# Patient Record
Sex: MALE | Race: WHITE | Employment: OTHER | URBAN - METROPOLITAN AREA
[De-identification: names, ages, dates, MRNs, and addresses within clinical notes are randomized per-mention and may not be internally consistent; named-entity substitution may affect disease eponyms.]

---

## 2018-08-17 ENCOUNTER — TRANSFERRED RECORDS (OUTPATIENT)
Dept: HEALTH INFORMATION MANAGEMENT | Facility: CLINIC | Age: 73
End: 2018-08-17

## 2018-08-17 ENCOUNTER — HOSPITAL ENCOUNTER (OUTPATIENT)
Dept: LAB | Facility: CLINIC | Age: 73
Discharge: HOME | End: 2018-08-17
Attending: ALLERGY & IMMUNOLOGY | Admitting: ALLERGY & IMMUNOLOGY
Payer: COMMERCIAL

## 2018-08-17 DIAGNOSIS — J30.1 ALLERGIC RHINITIS DUE TO POLLEN: Primary | ICD-10-CM

## 2018-08-17 PROCEDURE — 86003 ALLG SPEC IGE CRUDE XTRC EA: CPT | Performed by: ALLERGY & IMMUNOLOGY

## 2018-08-17 PROCEDURE — 36415 COLL VENOUS BLD VENIPUNCTURE: CPT | Performed by: ALLERGY & IMMUNOLOGY

## 2018-08-20 LAB
D FARINAE IGE QN: <0.1 KU(A)/L
D PTERONYSS IGE QN: <0.1 KU(A)/L

## 2018-08-21 LAB
A ALTERNATA IGE QN: <0.1 KU(A)/L
A FUMIGATUS IGE QN: <0.1 KU(A)/L
C HERBARUM IGE QN: <0.1 KU(A)/L
CAT DANDER IGG QN: <0.1 KU(A)/L
COMMON RAGWEED IGE QN: <0.1 KU(A)/L
DOG DANDER+EPITH IGE QN: <0.1 KU(A)/L
MAPLE IGE QN: <0.1 KU(A)/L
P NOTATUM IGE QN: <0.1 KU(A)/L
S ROSTRATA IGE QN: <0.1 KU(A)/L
SILVER BIRCH IGE QN: <0.1 KU(A)/L
TIMOTHY IGE QN: <0.1 KU(A)/L
WHITE ELM IGE QN: <0.1 KU(A)/L
WHITE OAK IGE QN: <0.1 KU(A)/L

## 2020-01-01 ENCOUNTER — NURSE TRIAGE (OUTPATIENT)
Dept: NURSING | Facility: CLINIC | Age: 75
End: 2020-01-01

## 2020-01-01 ENCOUNTER — APPOINTMENT (OUTPATIENT)
Dept: ULTRASOUND IMAGING | Facility: CLINIC | Age: 75
End: 2020-01-01
Attending: INTERNAL MEDICINE
Payer: COMMERCIAL

## 2020-01-01 ENCOUNTER — HOSPITAL ENCOUNTER (OUTPATIENT)
Facility: CLINIC | Age: 75
Setting detail: OBSERVATION
Discharge: HOME | End: 2020-10-30
Attending: EMERGENCY MEDICINE | Admitting: RADIOLOGY
Payer: COMMERCIAL

## 2020-01-01 ENCOUNTER — APPOINTMENT (OUTPATIENT)
Dept: GENERAL RADIOLOGY | Facility: CLINIC | Age: 75
End: 2020-01-01
Attending: EMERGENCY MEDICINE
Payer: COMMERCIAL

## 2020-01-01 VITALS
BODY MASS INDEX: 30.23 KG/M2 | OXYGEN SATURATION: 90 % | SYSTOLIC BLOOD PRESSURE: 131 MMHG | WEIGHT: 192.6 LBS | DIASTOLIC BLOOD PRESSURE: 54 MMHG | RESPIRATION RATE: 20 BRPM | HEIGHT: 67 IN | HEART RATE: 67 BPM | TEMPERATURE: 97.4 F

## 2020-01-01 DIAGNOSIS — I48.91 NEW ONSET ATRIAL FIBRILLATION (H): ICD-10-CM

## 2020-01-01 DIAGNOSIS — D61.818 PANCYTOPENIA (H): ICD-10-CM

## 2020-01-01 DIAGNOSIS — J90 RECURRENT RIGHT PLEURAL EFFUSION: ICD-10-CM

## 2020-01-01 LAB
ABO + RH BLD: NORMAL
ABO + RH BLD: NORMAL
ALBUMIN SERPL-MCNC: 2.5 G/DL (ref 3.4–5)
ALP SERPL-CCNC: 144 U/L (ref 40–150)
ALT SERPL W P-5'-P-CCNC: 29 U/L (ref 0–70)
ANION GAP SERPL CALCULATED.3IONS-SCNC: 8 MMOL/L (ref 3–14)
APTT PPP: 39 SEC (ref 22–37)
AST SERPL W P-5'-P-CCNC: 36 U/L (ref 0–45)
BACTERIA SPEC CULT: NO GROWTH
BACTERIA SPEC CULT: NO GROWTH
BASOPHILS # BLD AUTO: 0 10E9/L (ref 0–0.2)
BASOPHILS NFR BLD AUTO: 0.5 %
BILIRUB SERPL-MCNC: 1.3 MG/DL (ref 0.2–1.3)
BLD GP AB SCN SERPL QL: NORMAL
BLOOD BANK CMNT PATIENT-IMP: NORMAL
BUN SERPL-MCNC: 11 MG/DL (ref 7–30)
CALCIUM SERPL-MCNC: 8 MG/DL (ref 8.5–10.1)
CHLORIDE SERPL-SCNC: 115 MMOL/L (ref 94–109)
CO2 SERPL-SCNC: 21 MMOL/L (ref 20–32)
CREAT SERPL-MCNC: 0.77 MG/DL (ref 0.66–1.25)
DIFFERENTIAL METHOD BLD: ABNORMAL
ELLIPTOCYTES BLD QL SMEAR: SLIGHT
EOSINOPHIL # BLD AUTO: 0.1 10E9/L (ref 0–0.7)
EOSINOPHIL NFR BLD AUTO: 2.6 %
ERYTHROCYTE [DISTWIDTH] IN BLOOD BY AUTOMATED COUNT: 18.5 % (ref 10–15)
ERYTHROCYTE [DISTWIDTH] IN BLOOD BY AUTOMATED COUNT: 18.6 % (ref 10–15)
GFR SERPL CREATININE-BSD FRML MDRD: 89 ML/MIN/{1.73_M2}
GLUCOSE BLDC GLUCOMTR-MCNC: 129 MG/DL (ref 70–99)
GLUCOSE BLDC GLUCOMTR-MCNC: 58 MG/DL (ref 70–99)
GLUCOSE BLDC GLUCOMTR-MCNC: 70 MG/DL (ref 70–99)
GLUCOSE BLDC GLUCOMTR-MCNC: 93 MG/DL (ref 70–99)
GLUCOSE SERPL-MCNC: 76 MG/DL (ref 70–99)
HCT VFR BLD AUTO: 31.4 % (ref 40–53)
HCT VFR BLD AUTO: 33.9 % (ref 40–53)
HGB BLD-MCNC: 10.1 G/DL (ref 13.3–17.7)
HGB BLD-MCNC: 9.5 G/DL (ref 13.3–17.7)
IMM GRANULOCYTES # BLD: 0 10E9/L (ref 0–0.4)
IMM GRANULOCYTES NFR BLD: 0.5 %
INR PPP: 1.33 (ref 0.86–1.14)
INR PPP: 1.51 (ref 0.86–1.14)
INTERPRETATION ECG - MUSE: NORMAL
LABORATORY COMMENT REPORT: NORMAL
LYMPHOCYTES # BLD AUTO: 0.3 10E9/L (ref 0.8–5.3)
LYMPHOCYTES NFR BLD AUTO: 17.3 %
MAGNESIUM SERPL-MCNC: 1.5 MG/DL (ref 1.6–2.3)
MCH RBC QN AUTO: 27 PG (ref 26.5–33)
MCH RBC QN AUTO: 27 PG (ref 26.5–33)
MCHC RBC AUTO-ENTMCNC: 29.8 G/DL (ref 31.5–36.5)
MCHC RBC AUTO-ENTMCNC: 30.3 G/DL (ref 31.5–36.5)
MCV RBC AUTO: 89 FL (ref 78–100)
MCV RBC AUTO: 91 FL (ref 78–100)
MONOCYTES # BLD AUTO: 0.2 10E9/L (ref 0–1.3)
MONOCYTES NFR BLD AUTO: 11.5 %
NEUTROPHILS # BLD AUTO: 1.3 10E9/L (ref 1.6–8.3)
NEUTROPHILS NFR BLD AUTO: 67.6 %
NRBC # BLD AUTO: 0 10*3/UL
NRBC BLD AUTO-RTO: 0 /100
PLATELET # BLD AUTO: 40 10E9/L (ref 150–450)
PLATELET # BLD AUTO: 45 10E9/L (ref 150–450)
PLATELET # BLD EST: ABNORMAL 10*3/UL
POTASSIUM SERPL-SCNC: 3.8 MMOL/L (ref 3.4–5.3)
PROT SERPL-MCNC: 6.5 G/DL (ref 6.8–8.8)
RBC # BLD AUTO: 3.52 10E12/L (ref 4.4–5.9)
RBC # BLD AUTO: 3.74 10E12/L (ref 4.4–5.9)
SARS-COV-2 RNA SPEC QL NAA+PROBE: NEGATIVE
SARS-COV-2 RNA SPEC QL NAA+PROBE: NORMAL
SODIUM SERPL-SCNC: 144 MMOL/L (ref 133–144)
SPECIMEN EXP DATE BLD: NORMAL
SPECIMEN SOURCE: NORMAL
TROPONIN I SERPL-MCNC: <0.015 UG/L (ref 0–0.04)
TSH SERPL DL<=0.005 MIU/L-ACNC: 2.45 MU/L (ref 0.4–4)
WBC # BLD AUTO: 1.6 10E9/L (ref 4–11)
WBC # BLD AUTO: 1.9 10E9/L (ref 4–11)

## 2020-01-01 PROCEDURE — 32555 ASPIRATE PLEURA W/ IMAGING: CPT

## 2020-01-01 PROCEDURE — 99220 PR INITIAL OBSERVATION CARE,LEVEL III: CPT | Performed by: INTERNAL MEDICINE

## 2020-01-01 PROCEDURE — 96374 THER/PROPH/DIAG INJ IV PUSH: CPT

## 2020-01-01 PROCEDURE — U0003 INFECTIOUS AGENT DETECTION BY NUCLEIC ACID (DNA OR RNA); SEVERE ACUTE RESPIRATORY SYNDROME CORONAVIRUS 2 (SARS-COV-2) (CORONAVIRUS DISEASE [COVID-19]), AMPLIFIED PROBE TECHNIQUE, MAKING USE OF HIGH THROUGHPUT TECHNOLOGIES AS DESCRIBED BY CMS-2020-01-R: HCPCS | Performed by: EMERGENCY MEDICINE

## 2020-01-01 PROCEDURE — 85025 COMPLETE CBC W/AUTO DIFF WBC: CPT | Performed by: EMERGENCY MEDICINE

## 2020-01-01 PROCEDURE — 250N000013 HC RX MED GY IP 250 OP 250 PS 637: Performed by: INTERNAL MEDICINE

## 2020-01-01 PROCEDURE — 85610 PROTHROMBIN TIME: CPT | Performed by: INTERNAL MEDICINE

## 2020-01-01 PROCEDURE — 83735 ASSAY OF MAGNESIUM: CPT | Performed by: EMERGENCY MEDICINE

## 2020-01-01 PROCEDURE — 80053 COMPREHEN METABOLIC PANEL: CPT | Performed by: EMERGENCY MEDICINE

## 2020-01-01 PROCEDURE — 99217 PR OBSERVATION CARE DISCHARGE: CPT | Performed by: INTERNAL MEDICINE

## 2020-01-01 PROCEDURE — 99285 EMERGENCY DEPT VISIT HI MDM: CPT | Mod: 25

## 2020-01-01 PROCEDURE — C9803 HOPD COVID-19 SPEC COLLECT: HCPCS

## 2020-01-01 PROCEDURE — 71045 X-RAY EXAM CHEST 1 VIEW: CPT

## 2020-01-01 PROCEDURE — 36415 COLL VENOUS BLD VENIPUNCTURE: CPT | Performed by: INTERNAL MEDICINE

## 2020-01-01 PROCEDURE — 250N000009 HC RX 250: Performed by: RADIOLOGY

## 2020-01-01 PROCEDURE — 86900 BLOOD TYPING SEROLOGIC ABO: CPT | Performed by: EMERGENCY MEDICINE

## 2020-01-01 PROCEDURE — 85610 PROTHROMBIN TIME: CPT | Performed by: EMERGENCY MEDICINE

## 2020-01-01 PROCEDURE — 85730 THROMBOPLASTIN TIME PARTIAL: CPT | Performed by: EMERGENCY MEDICINE

## 2020-01-01 PROCEDURE — 84443 ASSAY THYROID STIM HORMONE: CPT | Performed by: EMERGENCY MEDICINE

## 2020-01-01 PROCEDURE — 86901 BLOOD TYPING SEROLOGIC RH(D): CPT | Performed by: EMERGENCY MEDICINE

## 2020-01-01 PROCEDURE — 85027 COMPLETE CBC AUTOMATED: CPT | Performed by: INTERNAL MEDICINE

## 2020-01-01 PROCEDURE — G0378 HOSPITAL OBSERVATION PER HR: HCPCS

## 2020-01-01 PROCEDURE — 84484 ASSAY OF TROPONIN QUANT: CPT | Performed by: EMERGENCY MEDICINE

## 2020-01-01 PROCEDURE — 93005 ELECTROCARDIOGRAM TRACING: CPT

## 2020-01-01 PROCEDURE — 999N001017 HC STATISTIC GLUCOSE BY METER IP

## 2020-01-01 PROCEDURE — 250N000011 HC RX IP 250 OP 636: Performed by: EMERGENCY MEDICINE

## 2020-01-01 PROCEDURE — 86850 RBC ANTIBODY SCREEN: CPT | Performed by: EMERGENCY MEDICINE

## 2020-01-01 RX ORDER — FUROSEMIDE 20 MG
10 TABLET ORAL DAILY
Qty: 30 TABLET | Refills: 3 | Status: SHIPPED | OUTPATIENT
Start: 2020-01-01 | End: 2021-01-01

## 2020-01-01 RX ORDER — ONDANSETRON 4 MG/1
4 TABLET, ORALLY DISINTEGRATING ORAL EVERY 6 HOURS PRN
Status: DISCONTINUED | OUTPATIENT
Start: 2020-01-01 | End: 2020-01-01 | Stop reason: HOSPADM

## 2020-01-01 RX ORDER — FUROSEMIDE 40 MG
40 TABLET ORAL DAILY
Status: DISCONTINUED | OUTPATIENT
Start: 2020-01-01 | End: 2020-01-01

## 2020-01-01 RX ORDER — AMOXICILLIN 250 MG
2 CAPSULE ORAL 2 TIMES DAILY PRN
Status: DISCONTINUED | OUTPATIENT
Start: 2020-01-01 | End: 2020-01-01 | Stop reason: HOSPADM

## 2020-01-01 RX ORDER — ONDANSETRON 2 MG/ML
4 INJECTION INTRAMUSCULAR; INTRAVENOUS EVERY 6 HOURS PRN
Status: DISCONTINUED | OUTPATIENT
Start: 2020-01-01 | End: 2020-01-01 | Stop reason: HOSPADM

## 2020-01-01 RX ORDER — AMOXICILLIN 250 MG
1 CAPSULE ORAL 2 TIMES DAILY PRN
Status: DISCONTINUED | OUTPATIENT
Start: 2020-01-01 | End: 2020-01-01 | Stop reason: HOSPADM

## 2020-01-01 RX ORDER — LIDOCAINE HYDROCHLORIDE 10 MG/ML
10 INJECTION, SOLUTION EPIDURAL; INFILTRATION; INTRACAUDAL; PERINEURAL ONCE
Status: COMPLETED | OUTPATIENT
Start: 2020-01-01 | End: 2020-01-01

## 2020-01-01 RX ORDER — ATORVASTATIN CALCIUM 20 MG/1
20 TABLET, FILM COATED ORAL EVERY MORNING
Status: DISCONTINUED | OUTPATIENT
Start: 2020-01-01 | End: 2020-01-01 | Stop reason: HOSPADM

## 2020-01-01 RX ORDER — ACETAMINOPHEN 325 MG/1
650 TABLET ORAL EVERY 8 HOURS PRN
Status: DISCONTINUED | OUTPATIENT
Start: 2020-01-01 | End: 2020-01-01 | Stop reason: HOSPADM

## 2020-01-01 RX ORDER — SPIRONOLACTONE 25 MG/1
25 TABLET ORAL DAILY
Qty: 30 TABLET | Refills: 3 | Status: SHIPPED | OUTPATIENT
Start: 2020-01-01 | End: 2021-01-01

## 2020-01-01 RX ORDER — POLYETHYLENE GLYCOL 3350 17 G/17G
17 POWDER, FOR SOLUTION ORAL DAILY PRN
Status: DISCONTINUED | OUTPATIENT
Start: 2020-01-01 | End: 2020-01-01 | Stop reason: HOSPADM

## 2020-01-01 RX ORDER — CARVEDILOL 3.12 MG/1
3.12 TABLET ORAL 2 TIMES DAILY WITH MEALS
Status: DISCONTINUED | OUTPATIENT
Start: 2020-01-01 | End: 2020-01-01 | Stop reason: HOSPADM

## 2020-01-01 RX ORDER — CARVEDILOL 3.12 MG/1
3.12 TABLET ORAL 2 TIMES DAILY WITH MEALS
COMMUNITY

## 2020-01-01 RX ORDER — SPIRONOLACTONE 25 MG/1
25 TABLET ORAL DAILY
Status: DISCONTINUED | OUTPATIENT
Start: 2020-01-01 | End: 2020-01-01

## 2020-01-01 RX ORDER — FUROSEMIDE 10 MG/ML
40 INJECTION INTRAMUSCULAR; INTRAVENOUS ONCE
Status: COMPLETED | OUTPATIENT
Start: 2020-01-01 | End: 2020-01-01

## 2020-01-01 RX ADMIN — METFORMIN HYDROCHLORIDE 500 MG: 500 TABLET, FILM COATED ORAL at 20:53

## 2020-01-01 RX ADMIN — LIDOCAINE HYDROCHLORIDE 10 ML: 10 INJECTION, SOLUTION EPIDURAL; INFILTRATION; INTRACAUDAL; PERINEURAL at 09:43

## 2020-01-01 RX ADMIN — OMEPRAZOLE 20 MG: 20 CAPSULE, DELAYED RELEASE ORAL at 08:58

## 2020-01-01 RX ADMIN — METFORMIN HYDROCHLORIDE 500 MG: 500 TABLET, FILM COATED ORAL at 08:58

## 2020-01-01 RX ADMIN — CARVEDILOL 3.12 MG: 3.12 TABLET, FILM COATED ORAL at 08:58

## 2020-01-01 RX ADMIN — CARVEDILOL 3.12 MG: 3.12 TABLET, FILM COATED ORAL at 21:35

## 2020-01-01 RX ADMIN — OMEPRAZOLE 20 MG: 20 CAPSULE, DELAYED RELEASE ORAL at 20:53

## 2020-01-01 RX ADMIN — FUROSEMIDE 40 MG: 10 INJECTION, SOLUTION INTRAMUSCULAR; INTRAVENOUS at 17:28

## 2020-01-01 RX ADMIN — ATORVASTATIN CALCIUM 20 MG: 20 TABLET, FILM COATED ORAL at 08:58

## 2020-01-01 ASSESSMENT — ENCOUNTER SYMPTOMS
SHORTNESS OF BREATH: 1
FEVER: 0
COUGH: 0

## 2020-01-01 ASSESSMENT — MIFFLIN-ST. JEOR: SCORE: 1572.26

## 2020-08-29 ENCOUNTER — APPOINTMENT (OUTPATIENT)
Dept: CT IMAGING | Facility: CLINIC | Age: 75
DRG: 432 | End: 2020-08-29
Attending: EMERGENCY MEDICINE
Payer: COMMERCIAL

## 2020-08-29 ENCOUNTER — APPOINTMENT (OUTPATIENT)
Dept: GENERAL RADIOLOGY | Facility: CLINIC | Age: 75
DRG: 432 | End: 2020-08-29
Attending: EMERGENCY MEDICINE
Payer: COMMERCIAL

## 2020-08-29 ENCOUNTER — HOSPITAL ENCOUNTER (INPATIENT)
Facility: CLINIC | Age: 75
LOS: 3 days | Discharge: HOME | DRG: 432 | End: 2020-09-01
Attending: EMERGENCY MEDICINE | Admitting: INTERNAL MEDICINE
Payer: COMMERCIAL

## 2020-08-29 DIAGNOSIS — R10.32 LLQ ABDOMINAL PAIN: ICD-10-CM

## 2020-08-29 DIAGNOSIS — J90 PLEURAL EFFUSION: ICD-10-CM

## 2020-08-29 DIAGNOSIS — K70.31 ALCOHOLIC CIRRHOSIS OF LIVER WITH ASCITES (H): Primary | ICD-10-CM

## 2020-08-29 DIAGNOSIS — R09.02 HYPOXIA: ICD-10-CM

## 2020-08-29 LAB
ALBUMIN SERPL-MCNC: 2.6 G/DL (ref 3.4–5)
ALP SERPL-CCNC: 159 U/L (ref 40–150)
ALT SERPL W P-5'-P-CCNC: 32 U/L (ref 0–70)
ANION GAP SERPL CALCULATED.3IONS-SCNC: 7 MMOL/L (ref 3–14)
APTT PPP: 36 SEC (ref 22–37)
AST SERPL W P-5'-P-CCNC: 47 U/L (ref 0–45)
BASOPHILS # BLD AUTO: 0 10E9/L (ref 0–0.2)
BASOPHILS NFR BLD AUTO: 0.3 %
BILIRUB SERPL-MCNC: 1.3 MG/DL (ref 0.2–1.3)
BUN SERPL-MCNC: 11 MG/DL (ref 7–30)
CALCIUM SERPL-MCNC: 8.3 MG/DL (ref 8.5–10.1)
CHLORIDE SERPL-SCNC: 111 MMOL/L (ref 94–109)
CO2 SERPL-SCNC: 24 MMOL/L (ref 20–32)
CREAT SERPL-MCNC: 0.82 MG/DL (ref 0.66–1.25)
DIFFERENTIAL METHOD BLD: ABNORMAL
EOSINOPHIL # BLD AUTO: 0.1 10E9/L (ref 0–0.7)
EOSINOPHIL NFR BLD AUTO: 2.7 %
ERYTHROCYTE [DISTWIDTH] IN BLOOD BY AUTOMATED COUNT: 17.4 % (ref 10–15)
GFR SERPL CREATININE-BSD FRML MDRD: 87 ML/MIN/{1.73_M2}
GLUCOSE SERPL-MCNC: 134 MG/DL (ref 70–99)
HCT VFR BLD AUTO: 35.1 % (ref 40–53)
HGB BLD-MCNC: 10.9 G/DL (ref 13.3–17.7)
IMM GRANULOCYTES # BLD: 0 10E9/L (ref 0–0.4)
IMM GRANULOCYTES NFR BLD: 0.3 %
INR PPP: 1.29 (ref 0.86–1.14)
LYMPHOCYTES # BLD AUTO: 0.3 10E9/L (ref 0.8–5.3)
LYMPHOCYTES NFR BLD AUTO: 9 %
MCH RBC QN AUTO: 26.7 PG (ref 26.5–33)
MCHC RBC AUTO-ENTMCNC: 31.1 G/DL (ref 31.5–36.5)
MCV RBC AUTO: 86 FL (ref 78–100)
MONOCYTES # BLD AUTO: 0.3 10E9/L (ref 0–1.3)
MONOCYTES NFR BLD AUTO: 9.6 %
NEUTROPHILS # BLD AUTO: 2.6 10E9/L (ref 1.6–8.3)
NEUTROPHILS NFR BLD AUTO: 78.1 %
NRBC # BLD AUTO: 0 10*3/UL
NRBC BLD AUTO-RTO: 0 /100
PLATELET # BLD AUTO: 57 10E9/L (ref 150–450)
POTASSIUM SERPL-SCNC: 3.7 MMOL/L (ref 3.4–5.3)
PROT SERPL-MCNC: 7.2 G/DL (ref 6.8–8.8)
RBC # BLD AUTO: 4.08 10E12/L (ref 4.4–5.9)
SODIUM SERPL-SCNC: 142 MMOL/L (ref 133–144)
WBC # BLD AUTO: 3.4 10E9/L (ref 4–11)

## 2020-08-29 PROCEDURE — 12000000 ZZH R&B MED SURG/OB

## 2020-08-29 PROCEDURE — 81001 URINALYSIS AUTO W/SCOPE: CPT | Performed by: EMERGENCY MEDICINE

## 2020-08-29 PROCEDURE — 84145 PROCALCITONIN (PCT): CPT | Performed by: EMERGENCY MEDICINE

## 2020-08-29 PROCEDURE — 87086 URINE CULTURE/COLONY COUNT: CPT | Performed by: EMERGENCY MEDICINE

## 2020-08-29 PROCEDURE — 71045 X-RAY EXAM CHEST 1 VIEW: CPT

## 2020-08-29 PROCEDURE — U0003 INFECTIOUS AGENT DETECTION BY NUCLEIC ACID (DNA OR RNA); SEVERE ACUTE RESPIRATORY SYNDROME CORONAVIRUS 2 (SARS-COV-2) (CORONAVIRUS DISEASE [COVID-19]), AMPLIFIED PROBE TECHNIQUE, MAKING USE OF HIGH THROUGHPUT TECHNOLOGIES AS DESCRIBED BY CMS-2020-01-R: HCPCS | Performed by: EMERGENCY MEDICINE

## 2020-08-29 PROCEDURE — 80053 COMPREHEN METABOLIC PANEL: CPT | Performed by: EMERGENCY MEDICINE

## 2020-08-29 PROCEDURE — 96374 THER/PROPH/DIAG INJ IV PUSH: CPT | Mod: 59

## 2020-08-29 PROCEDURE — 99285 EMERGENCY DEPT VISIT HI MDM: CPT | Mod: 25

## 2020-08-29 PROCEDURE — 87070 CULTURE OTHR SPECIMN AEROBIC: CPT | Performed by: EMERGENCY MEDICINE

## 2020-08-29 PROCEDURE — 89051 BODY FLUID CELL COUNT: CPT | Performed by: EMERGENCY MEDICINE

## 2020-08-29 PROCEDURE — 83036 HEMOGLOBIN GLYCOSYLATED A1C: CPT | Performed by: EMERGENCY MEDICINE

## 2020-08-29 PROCEDURE — 49082 ABD PARACENTESIS: CPT

## 2020-08-29 PROCEDURE — 0W9G3ZX DRAINAGE OF PERITONEAL CAVITY, PERCUTANEOUS APPROACH, DIAGNOSTIC: ICD-10-PCS | Performed by: EMERGENCY MEDICINE

## 2020-08-29 PROCEDURE — 71260 CT THORAX DX C+: CPT

## 2020-08-29 PROCEDURE — 85610 PROTHROMBIN TIME: CPT | Performed by: EMERGENCY MEDICINE

## 2020-08-29 PROCEDURE — 87205 SMEAR GRAM STAIN: CPT | Performed by: EMERGENCY MEDICINE

## 2020-08-29 PROCEDURE — 99223 1ST HOSP IP/OBS HIGH 75: CPT | Mod: AI | Performed by: INTERNAL MEDICINE

## 2020-08-29 PROCEDURE — 25000128 H RX IP 250 OP 636: Performed by: EMERGENCY MEDICINE

## 2020-08-29 PROCEDURE — 25000125 ZZHC RX 250: Performed by: EMERGENCY MEDICINE

## 2020-08-29 PROCEDURE — 85730 THROMBOPLASTIN TIME PARTIAL: CPT | Performed by: EMERGENCY MEDICINE

## 2020-08-29 PROCEDURE — 85025 COMPLETE CBC W/AUTO DIFF WBC: CPT | Performed by: EMERGENCY MEDICINE

## 2020-08-29 RX ORDER — CARVEDILOL 3.12 MG/1
3.12 TABLET ORAL 2 TIMES DAILY WITH MEALS
Status: ON HOLD | COMMUNITY
End: 2020-09-01

## 2020-08-29 RX ORDER — LIDOCAINE HYDROCHLORIDE AND EPINEPHRINE 10; 10 MG/ML; UG/ML
INJECTION, SOLUTION INFILTRATION; PERINEURAL
Status: DISCONTINUED
Start: 2020-08-29 | End: 2020-08-30 | Stop reason: HOSPADM

## 2020-08-29 RX ORDER — ATORVASTATIN CALCIUM 20 MG/1
20 TABLET, FILM COATED ORAL EVERY EVENING
COMMUNITY

## 2020-08-29 RX ORDER — MORPHINE SULFATE 4 MG/ML
4 INJECTION, SOLUTION INTRAMUSCULAR; INTRAVENOUS
Status: COMPLETED | OUTPATIENT
Start: 2020-08-29 | End: 2020-08-29

## 2020-08-29 RX ORDER — IOPAMIDOL 755 MG/ML
500 INJECTION, SOLUTION INTRAVASCULAR ONCE
Status: COMPLETED | OUTPATIENT
Start: 2020-08-29 | End: 2020-08-29

## 2020-08-29 RX ORDER — DICYCLOMINE HCL 20 MG
20 TABLET ORAL 2 TIMES DAILY
Status: ON HOLD | COMMUNITY
End: 2021-01-01

## 2020-08-29 RX ADMIN — IOPAMIDOL 100 ML: 755 INJECTION, SOLUTION INTRAVENOUS at 21:25

## 2020-08-29 RX ADMIN — MORPHINE SULFATE 4 MG: 4 INJECTION INTRAVENOUS at 23:10

## 2020-08-29 RX ADMIN — SODIUM CHLORIDE 65 ML: 9 INJECTION, SOLUTION INTRAVENOUS at 21:25

## 2020-08-29 ASSESSMENT — ENCOUNTER SYMPTOMS
SHORTNESS OF BREATH: 1
ABDOMINAL PAIN: 1
DIARRHEA: 1

## 2020-08-29 NOTE — ED TRIAGE NOTES
C/O diarrhea for 3 weeks. Pt seen at [facility] for diarrhea. Pt reports LLQ abdominal pain today. Blood in stool. Pt takes blood thinners. ABC in tact. A/OX4

## 2020-08-29 NOTE — LETTER
Transition Communication Hand-off for Care Transitions to Next Level of Care Provider    Name: Ramirez Leslie  : 1945  MRN #: [id number]  Primary Care Provider: Physician No Ref-Primary  Primary Care MD Name: Dr. Richi Kay  Primary Clinic: No address on file  Primary Care Clinic Name: [facility]  Reason for Hospitalization:  Pleural effusion [J90]  LLQ abdominal pain [R10.32]  Hypoxia [R09.02]  Admit Date/Time: 2020  7:06 PM  Discharge Date: 20  Payor Source: Payor: HU[employer] / Plan: HU[employer]NA MEDICARE ADVANTAGE / Product Type: Medicare /     Readmission Assessment Measure (ANUPAMA) Risk Score/category:            Reason for Communication Hand-off Referral: Fragility    Discharge Plan: home with home oxygen       Concern for non-adherence with plan of care:   no  Discharge Needs Assessment:  Needs      Most Recent Value   Anticipated Changes Related to Illness  none   # of Referrals Placed by [facility]  Durable Medical Equipment (DME)          Already enrolled in Tele-monitoring program and name of program:  none  Follow-up specialty is recommended: Yes    Follow-up plan:  No future appointments.    Any outstanding tests or procedures:            Supplies     Future Labs/Procedures    Oxygen Adult/Peds     Process Instructions:    By signing this order, the Authorizing Provider is attesting that they have completed a face-to-face evaluation on the patient to determine their need for this equipment in the last 60 days. A new face-to-face evaluation is required each time  A new prescription for on eo f the specified items is ordered.   If an additional provider completed the evaluation, please indicate their name below.     **As of 2018, an order requisition and face sheet will print for all DME orders. Please give printed order and face sheet to patient if not obtaining product from [facility] DME closet.     Comments:    Oxygen Documentation:   I certify that this  patient, Ramirez Leslie has been under my care (or a nurse practitioner or physican's assistant working with me). This is the face-to-face encounter for oxygen medical necessity.      Ramirez Leslie is now in a chronic stable state and continues to require supplemental oxygen. Patient has continued oxygen desaturation due to Acute hypoxic respiratory failure .    Alternative treatment(s) tried or considered and deemed clinically infective for treatment of hydrothorax include inhalers.  If portability is ordered, is the patient mobile within the home? yes    Patients who qualify for home O2 coverage under the CMS guidelines require ABG tests or O2 sat readings obtained closest to, but no earlier than 2 days prior to the discharge, as evidence of the need for home oxygen therapy. Testing must be performed while patient is in the chronic stable state. See notes for O2 sats.            Key Recommendations:  CTS following for discharge planning. Patient demographics and chart review show no PCP.  Patient admitted for SOB, abd pain, anasarca, large pleural effusion.  Patient s/p thorocentesis 8/31. Patient has a complex medical history including h/o ALEXANDER cirrhosis, chronic diarrhea, prostatectomy and type 2 diabetes. Patient has multiple speciality providers.     Patient reports his PCP is Dr. Richi Kay with [facility]. Patient last seen by Dr. Kay in June 2020. State he is also followed by Dr. Angel Roman with [facility]. [facility] also following patient on this admission. Encouraged f/u with his providers. A post hospitalization follow up appointment was schedule with Dr. Kay on September 8th at 10:30 pm.       A handoff  sent to his PCP care coordination team at discharge. Patient can benefit from care coordination support with complex disease management and multiple speciality providers involvement.    Campbell Salcido RN    AVS/Discharge Summary is the source of truth; this  is a helpful guide for improved communication of patient story

## 2020-08-30 PROBLEM — J96.01 ACUTE HYPOXEMIC RESPIRATORY FAILURE (H): Status: ACTIVE | Noted: 2020-08-30

## 2020-08-30 LAB
ALBUMIN UR-MCNC: 30 MG/DL
AMORPH CRY #/AREA URNS HPF: ABNORMAL /HPF
ANION GAP SERPL CALCULATED.3IONS-SCNC: 4 MMOL/L (ref 3–14)
APPEARANCE FLD: NORMAL
APPEARANCE UR: CLEAR
BILIRUB UR QL STRIP: NEGATIVE
BUN SERPL-MCNC: 11 MG/DL (ref 7–30)
CALCIUM SERPL-MCNC: 7.7 MG/DL (ref 8.5–10.1)
CHLORIDE SERPL-SCNC: 112 MMOL/L (ref 94–109)
CO2 SERPL-SCNC: 26 MMOL/L (ref 20–32)
COLOR FLD: YELLOW
COLOR UR AUTO: ABNORMAL
CREAT SERPL-MCNC: 0.77 MG/DL (ref 0.66–1.25)
ERYTHROCYTE [DISTWIDTH] IN BLOOD BY AUTOMATED COUNT: 17.3 % (ref 10–15)
GFR SERPL CREATININE-BSD FRML MDRD: 89 ML/MIN/{1.73_M2}
GLUCOSE BLDC GLUCOMTR-MCNC: 101 MG/DL (ref 70–99)
GLUCOSE BLDC GLUCOMTR-MCNC: 143 MG/DL (ref 70–99)
GLUCOSE BLDC GLUCOMTR-MCNC: 172 MG/DL (ref 70–99)
GLUCOSE BLDC GLUCOMTR-MCNC: 73 MG/DL (ref 70–99)
GLUCOSE BLDC GLUCOMTR-MCNC: 88 MG/DL (ref 70–99)
GLUCOSE BLDC GLUCOMTR-MCNC: 94 MG/DL (ref 70–99)
GLUCOSE BLDC GLUCOMTR-MCNC: 95 MG/DL (ref 70–99)
GLUCOSE BLDC GLUCOMTR-MCNC: 99 MG/DL (ref 70–99)
GLUCOSE BLDC GLUCOMTR-MCNC: 99 MG/DL (ref 70–99)
GLUCOSE SERPL-MCNC: 92 MG/DL (ref 70–99)
GLUCOSE UR STRIP-MCNC: NEGATIVE MG/DL
GRAM STN SPEC: NORMAL
HBA1C MFR BLD: 5.9 % (ref 0–5.6)
HCT VFR BLD AUTO: 32.7 % (ref 40–53)
HGB BLD-MCNC: 9.8 G/DL (ref 13.3–17.7)
HGB UR QL STRIP: ABNORMAL
HYALINE CASTS #/AREA URNS LPF: 4 /LPF (ref 0–2)
KETONES UR STRIP-MCNC: NEGATIVE MG/DL
LABORATORY COMMENT REPORT: NORMAL
LDH SERPL L TO P-CCNC: 234 U/L (ref 85–227)
LEUKOCYTE ESTERASE UR QL STRIP: ABNORMAL
LYMPHOCYTES NFR FLD MANUAL: 29 %
MAGNESIUM SERPL-MCNC: 1.4 MG/DL (ref 1.6–2.3)
MAGNESIUM SERPL-MCNC: 2.2 MG/DL (ref 1.6–2.3)
MCH RBC QN AUTO: 26.4 PG (ref 26.5–33)
MCHC RBC AUTO-ENTMCNC: 30 G/DL (ref 31.5–36.5)
MCV RBC AUTO: 88 FL (ref 78–100)
MUCOUS THREADS #/AREA URNS LPF: PRESENT /LPF
NEUTS BAND NFR FLD MANUAL: 71 %
NITRATE UR QL: NEGATIVE
PH UR STRIP: 5.5 PH (ref 5–7)
PLATELET # BLD AUTO: 41 10E9/L (ref 150–450)
POTASSIUM SERPL-SCNC: 3.6 MMOL/L (ref 3.4–5.3)
PROCALCITONIN SERPL-MCNC: <0.05 NG/ML
PROT SERPL-MCNC: 6.2 G/DL (ref 6.8–8.8)
RBC # BLD AUTO: 3.71 10E12/L (ref 4.4–5.9)
RBC #/AREA URNS AUTO: 39 /HPF (ref 0–2)
SARS-COV-2 RNA SPEC QL NAA+PROBE: NEGATIVE
SARS-COV-2 RNA SPEC QL NAA+PROBE: NORMAL
SODIUM SERPL-SCNC: 142 MMOL/L (ref 133–144)
SOURCE: ABNORMAL
SP GR UR STRIP: 1.01 (ref 1–1.03)
SPECIMEN SOURCE FLD: NORMAL
SPECIMEN SOURCE: NORMAL
SQUAMOUS #/AREA URNS AUTO: 1 /HPF (ref 0–1)
UROBILINOGEN UR STRIP-MCNC: NORMAL MG/DL (ref 0–2)
WBC # BLD AUTO: 2.6 10E9/L (ref 4–11)
WBC # FLD AUTO: 1009 /UL
WBC #/AREA URNS AUTO: 31 /HPF (ref 0–5)

## 2020-08-30 PROCEDURE — 25000131 ZZH RX MED GY IP 250 OP 636 PS 637: Performed by: INTERNAL MEDICINE

## 2020-08-30 PROCEDURE — 83735 ASSAY OF MAGNESIUM: CPT | Performed by: INTERNAL MEDICINE

## 2020-08-30 PROCEDURE — 25000128 H RX IP 250 OP 636: Performed by: INTERNAL MEDICINE

## 2020-08-30 PROCEDURE — 80048 BASIC METABOLIC PNL TOTAL CA: CPT | Performed by: INTERNAL MEDICINE

## 2020-08-30 PROCEDURE — 00000146 ZZHCL STATISTIC GLUCOSE BY METER IP

## 2020-08-30 PROCEDURE — 99207 ZZC APP CREDIT; MD BILLING SHARED VISIT: CPT | Performed by: INTERNAL MEDICINE

## 2020-08-30 PROCEDURE — 36415 COLL VENOUS BLD VENIPUNCTURE: CPT | Performed by: INTERNAL MEDICINE

## 2020-08-30 PROCEDURE — 12000000 ZZH R&B MED SURG/OB

## 2020-08-30 PROCEDURE — 84155 ASSAY OF PROTEIN SERUM: CPT | Performed by: INTERNAL MEDICINE

## 2020-08-30 PROCEDURE — 25000132 ZZH RX MED GY IP 250 OP 250 PS 637: Performed by: INTERNAL MEDICINE

## 2020-08-30 PROCEDURE — P9047 ALBUMIN (HUMAN), 25%, 50ML: HCPCS | Performed by: INTERNAL MEDICINE

## 2020-08-30 PROCEDURE — 85027 COMPLETE CBC AUTOMATED: CPT | Performed by: INTERNAL MEDICINE

## 2020-08-30 PROCEDURE — 83615 LACTATE (LD) (LDH) ENZYME: CPT | Performed by: INTERNAL MEDICINE

## 2020-08-30 RX ORDER — PROCHLORPERAZINE 25 MG
12.5 SUPPOSITORY, RECTAL RECTAL EVERY 12 HOURS PRN
Status: DISCONTINUED | OUTPATIENT
Start: 2020-08-30 | End: 2020-09-01 | Stop reason: HOSPADM

## 2020-08-30 RX ORDER — POTASSIUM CHLORIDE 29.8 MG/ML
20 INJECTION INTRAVENOUS
Status: DISCONTINUED | OUTPATIENT
Start: 2020-08-30 | End: 2020-08-30 | Stop reason: CLARIF

## 2020-08-30 RX ORDER — ALBUMIN (HUMAN) 12.5 G/50ML
125 SOLUTION INTRAVENOUS ONCE
Status: COMPLETED | OUTPATIENT
Start: 2020-08-30 | End: 2020-08-31

## 2020-08-30 RX ORDER — POTASSIUM CHLORIDE 1.5 G/1.58G
20-40 POWDER, FOR SOLUTION ORAL
Status: DISCONTINUED | OUTPATIENT
Start: 2020-08-30 | End: 2020-09-01 | Stop reason: HOSPADM

## 2020-08-30 RX ORDER — LOPERAMIDE HCL 2 MG
2 CAPSULE ORAL DAILY PRN
Status: DISCONTINUED | OUTPATIENT
Start: 2020-08-30 | End: 2020-08-30

## 2020-08-30 RX ORDER — ONDANSETRON 2 MG/ML
4 INJECTION INTRAMUSCULAR; INTRAVENOUS EVERY 6 HOURS PRN
Status: DISCONTINUED | OUTPATIENT
Start: 2020-08-30 | End: 2020-09-01 | Stop reason: HOSPADM

## 2020-08-30 RX ORDER — POTASSIUM CHLORIDE 1500 MG/1
20-40 TABLET, EXTENDED RELEASE ORAL
Status: DISCONTINUED | OUTPATIENT
Start: 2020-08-30 | End: 2020-09-01 | Stop reason: HOSPADM

## 2020-08-30 RX ORDER — LOPERAMIDE HCL 2 MG
2 CAPSULE ORAL 4 TIMES DAILY PRN
Status: DISCONTINUED | OUTPATIENT
Start: 2020-08-30 | End: 2020-09-01 | Stop reason: HOSPADM

## 2020-08-30 RX ORDER — NALOXONE HYDROCHLORIDE 0.4 MG/ML
.1-.4 INJECTION, SOLUTION INTRAMUSCULAR; INTRAVENOUS; SUBCUTANEOUS
Status: DISCONTINUED | OUTPATIENT
Start: 2020-08-30 | End: 2020-09-01 | Stop reason: HOSPADM

## 2020-08-30 RX ORDER — POTASSIUM CL/LIDO/0.9 % NACL 10MEQ/0.1L
10 INTRAVENOUS SOLUTION, PIGGYBACK (ML) INTRAVENOUS
Status: DISCONTINUED | OUTPATIENT
Start: 2020-08-30 | End: 2020-09-01 | Stop reason: HOSPADM

## 2020-08-30 RX ORDER — OXYCODONE HYDROCHLORIDE 5 MG/1
5 TABLET ORAL EVERY 4 HOURS PRN
Status: DISCONTINUED | OUTPATIENT
Start: 2020-08-30 | End: 2020-09-01 | Stop reason: HOSPADM

## 2020-08-30 RX ORDER — LIDOCAINE 40 MG/G
CREAM TOPICAL
Status: DISCONTINUED | OUTPATIENT
Start: 2020-08-30 | End: 2020-09-01 | Stop reason: HOSPADM

## 2020-08-30 RX ORDER — MAGNESIUM SULFATE HEPTAHYDRATE 40 MG/ML
4 INJECTION, SOLUTION INTRAVENOUS EVERY 4 HOURS PRN
Status: DISCONTINUED | OUTPATIENT
Start: 2020-08-30 | End: 2020-09-01 | Stop reason: HOSPADM

## 2020-08-30 RX ORDER — POTASSIUM CHLORIDE 7.45 MG/ML
10 INJECTION INTRAVENOUS
Status: DISCONTINUED | OUTPATIENT
Start: 2020-08-30 | End: 2020-09-01 | Stop reason: HOSPADM

## 2020-08-30 RX ORDER — CEFTRIAXONE 2 G/1
2 INJECTION, POWDER, FOR SOLUTION INTRAMUSCULAR; INTRAVENOUS EVERY 24 HOURS
Status: DISCONTINUED | OUTPATIENT
Start: 2020-08-30 | End: 2020-09-01 | Stop reason: HOSPADM

## 2020-08-30 RX ORDER — HYDROMORPHONE HYDROCHLORIDE 1 MG/ML
.3-.5 INJECTION, SOLUTION INTRAMUSCULAR; INTRAVENOUS; SUBCUTANEOUS
Status: DISCONTINUED | OUTPATIENT
Start: 2020-08-30 | End: 2020-09-01 | Stop reason: HOSPADM

## 2020-08-30 RX ORDER — PROCHLORPERAZINE MALEATE 5 MG
5 TABLET ORAL EVERY 6 HOURS PRN
Status: DISCONTINUED | OUTPATIENT
Start: 2020-08-30 | End: 2020-09-01 | Stop reason: HOSPADM

## 2020-08-30 RX ORDER — SPIRONOLACTONE 25 MG/1
25 TABLET ORAL DAILY
Status: DISCONTINUED | OUTPATIENT
Start: 2020-08-30 | End: 2020-09-01 | Stop reason: HOSPADM

## 2020-08-30 RX ORDER — LOPERAMIDE HCL 2 MG
2 CAPSULE ORAL DAILY
Status: DISCONTINUED | OUTPATIENT
Start: 2020-08-30 | End: 2020-08-30

## 2020-08-30 RX ORDER — DEXTROSE MONOHYDRATE 25 G/50ML
25-50 INJECTION, SOLUTION INTRAVENOUS
Status: DISCONTINUED | OUTPATIENT
Start: 2020-08-30 | End: 2020-09-01 | Stop reason: HOSPADM

## 2020-08-30 RX ORDER — ONDANSETRON 4 MG/1
4 TABLET, ORALLY DISINTEGRATING ORAL EVERY 6 HOURS PRN
Status: DISCONTINUED | OUTPATIENT
Start: 2020-08-30 | End: 2020-09-01 | Stop reason: HOSPADM

## 2020-08-30 RX ORDER — NICOTINE POLACRILEX 4 MG
15-30 LOZENGE BUCCAL
Status: DISCONTINUED | OUTPATIENT
Start: 2020-08-30 | End: 2020-09-01 | Stop reason: HOSPADM

## 2020-08-30 RX ORDER — FUROSEMIDE 40 MG
40 TABLET ORAL DAILY
Status: DISCONTINUED | OUTPATIENT
Start: 2020-08-30 | End: 2020-09-01 | Stop reason: HOSPADM

## 2020-08-30 RX ADMIN — SPIRONOLACTONE 25 MG: 25 TABLET ORAL at 00:52

## 2020-08-30 RX ADMIN — SPIRONOLACTONE 25 MG: 25 TABLET ORAL at 08:20

## 2020-08-30 RX ADMIN — LOPERAMIDE HYDROCHLORIDE 2 MG: 2 CAPSULE ORAL at 01:48

## 2020-08-30 RX ADMIN — ALBUMIN HUMAN 0.25 G: 0.25 SOLUTION INTRAVENOUS at 19:12

## 2020-08-30 RX ADMIN — ALBUMIN HUMAN 25 G: 0.25 SOLUTION INTRAVENOUS at 18:22

## 2020-08-30 RX ADMIN — MAGNESIUM SULFATE 4 G: 4 INJECTION INTRAVENOUS at 09:11

## 2020-08-30 RX ADMIN — CEFTRIAXONE 2 G: 2 INJECTION, POWDER, FOR SOLUTION INTRAMUSCULAR; INTRAVENOUS at 01:20

## 2020-08-30 RX ADMIN — OMEPRAZOLE 20 MG: 20 CAPSULE, DELAYED RELEASE ORAL at 19:51

## 2020-08-30 RX ADMIN — ALBUMIN HUMAN 25 G: 0.25 SOLUTION INTRAVENOUS at 17:50

## 2020-08-30 RX ADMIN — FUROSEMIDE 40 MG: 40 TABLET ORAL at 08:20

## 2020-08-30 RX ADMIN — OMEPRAZOLE 20 MG: 20 CAPSULE, DELAYED RELEASE ORAL at 08:20

## 2020-08-30 RX ADMIN — ONDANSETRON 4 MG: 2 INJECTION INTRAMUSCULAR; INTRAVENOUS at 11:56

## 2020-08-30 RX ADMIN — ALBUMIN HUMAN 0.25 G: 0.25 SOLUTION INTRAVENOUS at 18:44

## 2020-08-30 RX ADMIN — FUROSEMIDE 40 MG: 40 TABLET ORAL at 00:53

## 2020-08-30 ASSESSMENT — ACTIVITIES OF DAILY LIVING (ADL)
ADLS_ACUITY_SCORE: 14
ADLS_ACUITY_SCORE: 14
ADLS_ACUITY_SCORE: 13
ADLS_ACUITY_SCORE: 14
ADLS_ACUITY_SCORE: 14

## 2020-08-30 NOTE — PROGRESS NOTES
[facility]  Hospitalist Progress Note  Shankar Fair MD 08/30/2020    Reason for Stay        Acute hypoxemic respiratory failure    Liver disease with anasarca    Large right pleural effusion    Spontaneous bacterial peritonitis    Pancytopenia    Type 2 diabetes          Assessment and Plan:        Summary of Stay:     Ramirez Leslie is a 75 year old male with PMH including cirrhosis, HTN, nephrolithiasis, IDDM type II, and subdural hematoma who presents with abdominal pain, diarrhea, shortness of breath.   Patient has anasarca with evidence of large right pleural effusion with shortness of breath and hypoxia, ascites, bilateral lower extremity edema and generalized weakness.    Patient progress during stay:    Patient was admitted and diagnostic  fluid analysis ascites showed evidence of spontaneous bacterial peritonitis with polymorphonuclear cells of over 700.  Patient was started on intravenous ceftriaxone.  He was treated with oxygen and closely monitored in the hospital.  Gastroenterology team was consulted    Problem List with Assessment and Plan      1. Acute hypoxic respiratory failure secondary to  right large pleural effusion    Continue oxygen for now, therapeutic thoracentesis tomorrow    2. Ascites with spontaneous bacterial peritonitis      Continue ceftriaxone, monitor cultures    May need paracentesis tomorrow as well    3.  Abdominal pain likely secondary to #2: Improving    4.  Alcoholic liver disease with cirrhosis and complications including pancytopenia, ascites, pleural effusion: GI team consulted for further assistance    5.  Pancytopenia: No bleeding.  Continue to monitor for now    6.  Hypertension: Soft blood pressure.  Continue Lasix and spironolactone with parameters    7.  Type 2 diabetes: Metformin and glargine at home: Blood sugars running low this morning.  Will hold Lantus, continue sliding scale insulin, continue to hold metformin.  Continue to monitor  blood sugar    8.  SARS-CoV-2 PCR negative: May  discontinue isolation        Plan for today :    Discontinue COVID isolation    Hold Lantus    Thoracentesis as well as paracentesis tomorrow          LDA     Access : Peripheral     [place] Catheter: not present        FEN :    Orders Placed This Encounter      2 Gram Sodium Diet           Intake/Output Summary (Last 24 hours) at 8/30/2020 1228  Last data filed at 8/30/2020 1202  Gross per 24 hour   Intake 472 ml   Output 2675 ml   Net -2203 ml          DVT Prophylaxis: Pneumatic Compression Devices    Code Status:  Full Code    Estimated discharge  disposition and plan: Home likely in the next 2 days        Interval History (Subjective):        Patient is seen and examined by me today and medical record reviewed.Overnight events noted and care discussed with nursing staff.  Patient is feeling better today.  No significant shortness of breath.  No chest pain.  His appetite has been poor and has been having some nausea.  No vomiting.  No bleeding.                          Physical Exam:        Last Vital Signs:  BP (!) 142/80 (BP Location: Other (Comment))   Pulse 64   Temp 97.1  F (36.2  C) (Temporal)   Resp 20   Wt 91.5 kg (201 lb 12.8 oz)   SpO2 96%     I/O last 3 completed shifts:  In: -   Out: 1225 [Urine:1225]    Wt Readings from Last 5 Encounters:   08/30/20 91.5 kg (201 lb 12.8 oz)        Constitutional: Awake, alert, cooperative, no apparent distress     Respiratory:  No increased work of breathing.  Decreased air entry bilaterally more on the right   Cardiovascular: Regular rate and rhythm, normal S1 and S2, and no murmur noted   Abdomen:  Distended abdomen with evidence of ascites.  No significant tenderness or rebound tenderness.  Soft, no guarding or rigidity   Skin: No rashes, no cyanosis, dry to touch   Neuro: Alert with  no weakness, numbness, memory loss   Extremities:  Bilateral pitting edema   Other(s):        All other systems: Negative           Medications:        All current medications were reviewed with changes reflected in problem list.         Data:      All new lab and imaging data was reviewed.      Data reviewed today: I reviewed all new labs and imaging results over the last 24 hours. I personally reviewed no images or EKG's today      Recent Labs   Lab 08/30/20  0633 08/29/20 1931   WBC 2.6* 3.4*   HGB 9.8* 10.9*   HCT 32.7* 35.1*   MCV 88 86   PLT 41* 57*     Recent Labs   Lab 08/29/20  2343   CULT PENDING     Recent Labs   Lab 08/30/20  0633 08/29/20 1931    142   POTASSIUM 3.6 3.7   CHLORIDE 112* 111*   CO2 26 24   ANIONGAP 4 7   GLC 92 134*   BUN 11 11   CR 0.77 0.82   GFRESTIMATED 89 87   GFRESTBLACK >90 >90   ADIS 7.7* 8.3*   MAG 1.4*  --    PROTTOTAL 6.2* 7.2   ALBUMIN  --  2.6*   BILITOTAL  --  1.3   ALKPHOS  --  159*   AST  --  47*   ALT  --  32       Recent Labs   Lab 08/30/20  1156 08/30/20  1142 08/30/20  0852 08/30/20  0826 08/30/20  0633 08/30/20  0050 08/29/20 1931   GLC  --   --   --   --  92  --  134*   BGM 95 99 101* 73  --  88  --        Recent Labs   Lab 08/29/20 1931   INR 1.29*         No results for input(s): TROPONIN, TROPI, TROPR in the last 168 hours.    Invalid input(s): TROP, TROPONINIES    Recent Results (from the past 48 hour(s))   XR Chest Port 1 View    Narrative    EXAM: XR CHEST PORT 1 VW  LOCATION: [facility]  DATE/TIME: 8/29/2020 8:33 PM    INDICATION: Dyspnea. Hypoxia.  COMPARISON: None.      Impression    IMPRESSION: Dense opacification involving the majority of inferior right hemithorax most suggestive of a moderately large right pleural effusion with accompanying atelectasis/consolidation of right middle and lower lobes. Right apex remains aerated. Left   lung clear. There is no shift of midline borderline cardiomegaly.   CT Chest/Abdomen/Pelvis w Contrast    Narrative    EXAM: CT CHEST/ABDOMEN/PELVIS W CONTRAST  LOCATION: [facility]  DATE/TIME: 8/29/2020 9:20  PM    INDICATION: Hypoxia. Pleural effusion. Left lower quadrant pain  COMPARISON: Abdominal CT 07/21/2006  TECHNIQUE: CT scan of the chest, abdomen, and pelvis was performed following injection of IV contrast. Multiplanar reformats were obtained. Dose reduction techniques were used.   CONTRAST: 100mL Isovue-370    FINDINGS:   LUNGS AND PLEURA: Large right pleural effusion with complete atelectasis of right middle and right lower lobes calcified granulomata present within right lung base. No lung or pleural mass appreciated. Aerated portions of right upper lobe are   unremarkable.  Minimal changes of emphysema evident linear peripheral sites of fibrosis.  MEDIASTINUM/AXILLAE: No significant lymphadenopathy. Prominent. Esophageal varices. Normal-sized heart.    HEPATOBILIARY: Small cirrhotic liver with recanalized umbilical vein and esophageal varices and a small splenorenal shunt related to portal venous hypertension.  PANCREAS: Normal.  SPLEEN: Prominent splenomegaly. Calcified granulomata.  ADRENAL GLANDS: Normal.  KIDNEYS/BLADDER: There are 3 stones present within each kidney; largest on the left measures 6 x 4 mm and largest on the right measures 4 x 3 mm. No hydronephrosis.    BOWEL: No obstruction. There does appear to be modest wall thickening involving the right hemicolon which is nonspecific given the ascites and third spacing right hemicolectomy colitis can't be excluded. Mild diffuse congestion mesentery. Motion   artifact. Modest diffuse ascites.  LYMPH NODES: No significant lymphadenopathy.  VASCULATURE: Heavy atherosclerotic plaque.  PELVIC ORGANS: Mild ascites.    MUSCULOSKELETAL: Unremarkable.      Impression    IMPRESSION:  1.  Moderately large right pleural effusion with atelectasis of right middle and lower lobes. No suspicious chest mass evident.  2.  Cirrhosis and portal venous hypertension. Very prominent paraesophageal varices are present. Splenomegaly and modest diffuse ascites.  3.  Mild  nonspecific wall thickening of right hemicolon likely relates to ascites and third spacing though colitis also possible.  4.  Bilateral kidney stones. No hydroureter persists.         Disclaimer: This note consists of symbols derived from keyboarding, dictation and/or voice recognition software. As a result, there may be errors in the script that have gone undetected. Please consider this when interpreting information found in this chart.

## 2020-08-30 NOTE — CONSULTS
GASTROENTEROLOGY CONSULTATION     Ramirez Leslie  [address] [place] [address]  75 year old male    Admission Date/Time: 8/29/2020  Primary Care Provider: No Ref-Primary, Physician    We were asked to see the patient in consultation by Dr. Brown for evaluation of cirrhosis and diarrhea.        HPI:  Ramirez Leslie is a 75 year old male who is admitted with LLQ pain, ongoing diarrhea, and now foun to have SBP.  I came by to see him in person, but he was being ruled out for covid19, therefore I did not enter his room and I did a visit with him over the telephone.    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      The patient reports that he continues with the diarrhea and describes 4-6 mushy bowel movements per day.  He was started on dicyclomine a couple of weeks ago 20 mg bid through our office and he reports that does not help.  He was also tried on cholestyramine and that did not help.  On his [facility] chart review it appears that Imodium had helped at some point but then he stopped taking it because it made him feel sick to his stomach.  When I asked him today if he remembers taking imodium he said no.    He has had a thorough workup for his diarrhea which has included egd, colonoscopy with random colon biopsies, ttg, tsh, pill capsule, ct enterography, comprehensive stool panel which have all been unrevealing for his diarrhea.  He had reported some dark stools and the pill capsule showed some non-bleeding AVMs.    He reports to me today that his stools have never been black like tar.      CT scan on this admission has revealed large volume ascites, large right pleural effusion, paraesohageal varices, and mild nonspecific wall thickening of the right hemicolon that is likely related to his ascites.    diagnositic paracentesis shows SBP with 716 PMNs.    He is followed in our liver clinic for his ALEXANDER  cirrhosis and has not had ascites in the past, this is a new presentation.        ROS: A comprehensive ten point review of systems was negative aside from those in mentioned in the HPI.      MEDICATIONS: No current facility-administered medications on file prior to encounter.   atorvastatin (LIPITOR) 20 MG tablet, Take 20 mg by mouth every morning   carvedilol (COREG) 3.125 MG tablet, Take 3.125 mg by mouth 2 times daily (with meals)  dicyclomine (BENTYL) 20 MG tablet, Take 20 mg by mouth 2 times daily  insulin glargine (LANTUS PEN) 100 UNIT/ML pen, Inject 16 Units Subcutaneous every morning  metFORMIN (GLUCOPHAGE) 500 MG tablet, Take 500 mg by mouth 2 times daily (with meals)  omeprazole (PRILOSEC) 20 MG DR capsule, Take 20 mg by mouth 2 times daily        ALLERGIES: No Known Allergies    Past Medical History:   Diagnosis Date     Cirrhosis (H)      HTN (hypertension)      Nephrolithiasis      Subdural hematoma (H)      Type II diabetes mellitus (H)     insulin dependent       Past Surgical History:   Procedure Laterality Date     APPENDECTOMY       HC LAP,ORCHIECTOMY Left      LITHOTRIPSY           SOCIAL HISTORY:  Social History     Tobacco Use     Smoking status: Former Smoker   Substance Use Topics     Alcohol use: Not on file     Drug use: Not on file       FAMILY HISTORY:  Reviewed in chart    PHYSICAL EXAM:   BP (!) 142/80 (BP Location: Other (Comment))   Pulse 64   Temp 97.1  F (36.2  C) (Temporal)   Resp 20   Wt 91.5 kg (201 lb 12.8 oz)   SpO2 96%   Reviewed in chart          ADDITIONAL COMMENTS:   I reviewed the patient's new clinical lab test results.   Recent Labs   Lab Test 08/30/20 0633 08/29/20 1931 07/11/13  0953   WBC 2.6* 3.4* 3.4*   HGB 9.8* 10.9* 13.3   MCV 88 86 90   PLT 41* 57* 60*   INR  --  1.29*  --      Recent Labs   Lab Test 08/30/20 0633 08/29/20 1931    142   POTASSIUM 3.6 3.7   CHLORIDE 112* 111*   CO2 26 24   BUN 11 11   CR 0.77 0.82   ANIONGAP 4 7   ADIS 7.7* 8.3*   GLC  92 134*     Recent Labs   Lab Test 20  2343 20  1931   ALBUMIN  --  2.6*   BILITOTAL  --  1.3   ALT  --  32   AST  --  47*   ALKPHOS  --  159*   PROTEIN 30*  --              .    CONSULTATION ASSESSMENT AND PLAN:    1.  Decompensated ALEXANDER cirrhosis. 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  Appropriately on ceftriaxone.  I will add albumin dose now (1.5 mg/kg) and then on day 3 he should get another dose of albumin (1 mg/kg).    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  I agree with low dose of diuretics but keep a close watch on his Creatitine as he is at high risk of HRS in the setting of SBP.  If his kidney function continues to do well, we can definitely go up on the spironolactone as the ratio dose of lasix:spironolactone would be 40 mmg.  He is going to get therapeutic paracentesis and thoracentesis tomorrow, please make sure albumin is replaced appropriately.    2.  Chronic diarrhea  -- He has had extensive workup at [facility] for this this dating back to 2020, so this is a chronic issue for him.  His workup has included egd, colonoscopy with random colon biopsies, ttg, tsh, pill capsule, ct enterography, comprehensive stool panel which have all been unrevealing for his diarrhea.   Recently tried on dicyclomine without any help.  Cholestyramine in past has not helped.  Appears imodium has helped.  Will start one tablet per day.    Nice Solange Olivier MD  [facility]

## 2020-08-30 NOTE — PLAN OF CARE
Arrived to floor via cart at 0030. Given welcome packet and oriented to room and call light. A&O x4 with some mild forgetfulness. VSS on 5L of O2 via NC. Expiratory wheezes. MCGINNIS, SOB. Given lasix and spirolactone. Voiding. Diarrhea x1, this is not new, immodium given. BS active. Up at bedside to use urinal and commode independently. Requires SBA with GB to go further. 2gm NA diet. Bruising to abdomen and L bicep. COVID test pending, on special precautions. IV rocephin. Strict I&O. Awaiting thoracentesis.

## 2020-08-30 NOTE — ED NOTES
Pt returned from CT on 2 ltrs and was 80%, titrated to 5ltrs to maintain 90% or greater. Provider informed.

## 2020-08-30 NOTE — PROVIDER NOTIFICATION
REASON FOR CONTACT: Plt= 41 this am.  PROVIDER CONTACTED: Dr. Fair   TIME CONTACTED: now  MODE OF CONTACT: wbt

## 2020-08-30 NOTE — PROGRESS NOTES
Cross cover note.    Follow-up on fluid analysis from paracentesis.    Ascites fluid does appear to have evidence of SBP.    Start IV ceftriaxone 2 g every 24 hours.

## 2020-08-30 NOTE — PHARMACY-ADMISSION MEDICATION HISTORY
Admission medication history interview status for this patient is complete. See [facility] admission navigator for allergy information, prior to admission medications and immunization status.     Medication history interview done via telephone during Covid-19 pandemic, indicate source(s): Patient and Family (pharmacy intern left wife a message and wife called back)  Medication history resources (including written lists, pill bottles, clinic record): wife had a med list  Pharmacy: [facility] in [place]    Changes made to PTA medication list:  Added: all  Deleted: none  Changed: none    Actions taken by pharmacist (provider contacted, etc): talked to wife, talked to pt, talked to [facility].     Additional medication history information: Wife said pt is using injection for diabetes daily and spelled it out as Lovenox 120mg q12h x10 days, dated 11/01/19. Called pt to clarify - pt is only using ONE injection that he dials up to 16 - Lantus 16 units/day, no other injections. Per pt: he is not on any blood thinners. Per [facility]: no Lovenox on file. Per pt: stopped iron pills, no longer takes.    Medication reconciliation/reorder completed by provider prior to medication history?  no   (Y/N)     For patients on insulin therapy: yes  (Y/N)  Sliding scale Novolog: no, only on Lantus once daily.    Prior to Admission medications    Medication Sig Last Dose Taking? Auth Provider   atorvastatin (LIPITOR) 20 MG tablet Take 20 mg by mouth every morning  8/29/2020 at am Yes Unknown, Entered By History   carvedilol (COREG) 3.125 MG tablet Take 3.125 mg by mouth 2 times daily (with meals) 8/29/2020 at am Yes Unknown, Entered By History   dicyclomine (BENTYL) 20 MG tablet Take 20 mg by mouth 2 times daily 8/29/2020 at am Yes Unknown, Entered By History   insulin glargine (LANTUS PEN) 100 UNIT/ML pen Inject 16 Units Subcutaneous every morning 8/29/2020 at am Yes Unknown, Entered By History   metFORMIN (GLUCOPHAGE) 500 MG tablet  Take 500 mg by mouth 2 times daily (with meals) 8/29/2020 at am Yes Unknown, Entered By History   omeprazole (PRILOSEC) 20 MG DR capsule Take 20 mg by mouth 2 times daily 8/29/2020 at am Yes Unknown, Entered By History

## 2020-08-30 NOTE — ED PROVIDER NOTES
History     Chief Complaint:  Abdominal Pain     HPI:  Ramirez Leslie is a 75 year old male on Lovenox and Coumadin with a history of renal stones, cirrhosis, type 2 diabetes, hypertension, and congestive heart failure who presents with his wife for abdominal pain. Patient reports LLQ abdominal pain and intermittent leg swelling for the last couple months. Lying on his left side exacerbates his abdominal pain. He also reports watery diarrhea for the last 3 weeks. Patient also endorses shortness of breath.    Allergies:  No known drug allergies     Medications:    Lomotil  Hygroton  Insulin pen  Glucophage  Lasix  Duoneb  Keppra  Lovenox  Coumadin    Past Medical History:    Cirrhosis  Type 2 diabetes  Pancytopenia  Hypertension  Hyperglycemia  CHF   Renal tone  Prostate cancer     Past Surgical History:    Knee arthroscopy, right meniscus repair  Appendectomy  Orchiectomy  Lithotripsy x2  Prostatectomy  Cytoscopy with laser & stone basketing/bilateral stent placement    Family History:    Heart disease  Cerebral hemorrhage    Social History:  Smoking status: former, quit 2007  Alcohol use: yes  Patient presents with his wife.  Marital Status:       Review of Systems   Respiratory: Positive for shortness of breath.    Cardiovascular: Positive for leg swelling.   Gastrointestinal: Positive for abdominal pain and diarrhea.   All other systems reviewed and are negative.      Physical Exam     Vitals:  Patient Vitals for the past 24 hrs:   BP Temp Temp src Pulse Resp SpO2 Weight   08/29/20 2145 128/52 -- -- 87 23 (!) 88 % --   08/29/20 2115 (!) 143/70 -- -- 79 24 95 % --   08/29/20 2100 137/76 -- -- 71 25 94 % --   08/29/20 2045 (!) 149/74 -- -- 91 27 (!) 85 % --   08/29/20 2030 134/67 -- -- 86 22 93 % --   08/29/20 2015 (!) 140/75 -- -- 99 19 (!) 89 % --   08/29/20 2000 (!) 145/65 -- -- 73 16 90 % --   08/29/20 1945 -- -- -- 100 29 -- --   08/29/20 1930 -- -- -- 96 26 97 % --   08/29/20 1915 -- -- -- 101 14  93 % --   08/29/20 1856 (!) 162/98 98.3  F (36.8  C) Oral 96 16 97 % 93 kg (205 lb)       Physical Exam:  Constitutional: Vital signs reviewed as above.   Head: No external signs of trauma noted.  Eyes: Pupils are equal, round, and reactive to light.   Neck: No JVD noted  Cardiovascular: Normal rate, regular rhythm and normal heart sounds.  No murmur heard. Equal B/L peripheral pulses.  Pulmonary/Chest: No respiratory distress. Decreased breath sounds on the right lower lung field. Patient has no wheezes. Patient has no rales.   Gastrointestinal: Soft. There is notable LLQ tenderness with rebound.   Musculoskeletal/Extremities: B/L LE 1-2+ pitting edema. Normal tone.  Neurological: Patient is alert and oriented to person, place, and time.   Skin: Skin is warm and dry. There is no diaphoresis noted.   Psychiatric: The patient appears calm.      Emergency Department Course     Imaging:  CT Chest/Abdomen/Pelvis w Contrast   Final Result   IMPRESSION:   1.  Moderately large right pleural effusion with atelectasis of right middle and lower lobes. No suspicious chest mass evident.   2.  Cirrhosis and portal venous hypertension. Very prominent paraesophageal varices are present. Splenomegaly and modest diffuse ascites.   3.  Mild nonspecific wall thickening of right hemicolon likely relates to ascites and third spacing though colitis also possible.   4.  Bilateral kidney stones. No hydroureter persists.      XR Chest Port 1 View   Final Result   IMPRESSION: Dense opacification involving the majority of inferior right hemithorax most suggestive of a moderately large right pleural effusion with accompanying atelectasis/consolidation of right middle and lower lobes. Right apex remains aerated. Left    lung clear. There is no shift of midline borderline cardiomegaly.        Per radiology.    Laboratory:  CBC: WBC 3.4 (L), HGB 10.9 (L), PLT 57 (L)  CMP: Chloride 111 (H), Glucose 134 (H), Calcium 8.3 (L), Albumin 2.6 (L),  AlkPHos 159 (H0, AST 47 (H) o/w WNL (Creatinine 0.82)  INR: 1.29 (H)  PTT: 36    [facility]    -Paracentesis    Date/Time: 8/29/2020 11:07 PM  Performed by: Jese Patel DO  Authorized by: Jese Patel DO     UNIVERSAL PROTOCOL   Site Marked: Yes  Prior Images Obtained and Reviewed:  Yes  Required items: Required blood products, implants, devices and special equipment available    Patient identity confirmed:  Verbally with patient, arm band and provided demographic data  Patient was reevaluated immediately before administering moderate or deep sedation or anesthesia  Confirmation Checklist:  Patient's identity using two indicators, relevant allergies, procedure was appropriate and matched the consent or emergent situation and correct equipment/implants were available  Universal Protocol: the Joint Commission Universal Protocol was followed          PRE-PROCEDURE DETAILS     Procedure purpose:  Diagnostic    ANESTHESIA (see MAR for exact dosages):     Anesthesia method:  Local infiltration    Local anesthetic:  Lidocaine 1% WITH epi    PROCEDURE DETAILS     Needle gauge:  22    Puncture site:  R lower quadrant    Fluid removed amount:  12 mL    Fluid appearance:  Dorota    Dressing:  Adhesive bandage    PROCEDURE   Patient Tolerance:  Patient tolerated the procedure well with no immediate complications            Emergency Department Course:  1916  Past medical records, nursing notes, and vitals reviewed.    1931 IV was inserted and blood was drawn for laboratory testing, results above.    1957 Patient care was transferred to me from Dr. Renee.    2033 The patient was sent for a chest x-ray while in the emergency department, results above.     2045 I performed an exam of the patient and obtained history, as documented above.    2121 The patient was sent for a chest/abdomen/pelvis CT while in the emergency department, results above.       ED Course as of Aug 29 2310   Sat Aug 29,  2020   2241 D/W Dr. Brown - ok for Admit        Findings and plan explained to the Patient who consents to admission.    Discussed the patient with Dr. Brown, who will admit the patient to a medical bed for further monitoring, evaluation, and treatment.    I personally reviewed the laboratory & imaging results with the Patient and wife who voiced understanding of the findings. I answered all related questions prior to admission.     Impression & Plan     Medical Decision Making:  Ramirez Leslie is a 75 year old male who presents to the emergency department today for evaluation of diarrhea as well as left lower quadrant abdominal pain and shortness of breath.  Please see the HPI and exam for specifics.  The patient's physical exam pointed to a possible pleural effusion and that was confirmed on x-ray and CT.  The patient was transiently hypoxic but improved with supplemental oxygen.  CT imaging of the abdomen pelvis notes ascites but no focal abscess noted on the intestines in the left lower quadrant.  He is afebrile and his white count is normal so after discussion with the hospitalist we will plan on proceeding with a diagnostic paracentesis and antibiotics can be started if he has a fever or if the cultures show something.  The patient will also have a thoracentesis in the future as well.    Diagnosis:    ICD-10-CM    1. LLQ abdominal pain  R10.32    2. Hypoxia  R09.02    3. Pleural effusion  J90         Disposition:  Admitted to Dr. Brown      Scribe Disclosure:  Zully BALTAZAR, am serving as a scribe at 8:00 PM on 8/29/2020 to document services personally performed by Jese Patel DO based on my observations and the provider's statements to me.       Zully Wilkinson  8/29/2020     Jese Patel DO  08/29/20 [id number]

## 2020-08-30 NOTE — PROGRESS NOTES
Noted ecchymosis/ lump to left bicep, above AC. Pt stated that someone in the ER was attempting IV insertion before he finally informed them to stop d/t discomfort. Pt currently denies pain at the site. Site marked. Will continue to monitor.

## 2020-08-30 NOTE — H&P
[facility]  Hospitalist Admission Note  Name: Ramirez Leslie    MRN: [id number]  YOB: 1945    Age: 75 year old  Date of admission: 8/29/2020  Primary care provider: No Ref-Primary, Physician    Chief Complaint:  Abdominal pain, shortness of breath    Assessment and Plan:   Acute hypoxemic respiratory failure, anasarca, large right pleural effusion: Progressive shortness of breath over the past few weeks.  Has also had abdominal distention and lower extremity swelling bilaterally.  He is found to have anasarca and a very large right pleural effusion resulting in compression of the middle and right lower lobe.  Hypoxic in the ER below 88% at rest and worse with exertion.  Highly suspect his anasarca and effusion is secondary to his cirrhosis with portal venous hypertension.  Last TTE from 2017 showed mild LVH and increased LV filling pressures.  -Start furosemide 40 mg daily and spironolactone 25 mg daily tonight  -BMP tomorrow  -Daily weights and intake and output  -Ordered ultrasound thoracentesis both therapeutic and diagnostic, this may not happen until Monday  -Supplemental oxygen as needed and pulse ox continuous  -Obtain TTE for completeness sake    LLQ abdominal pain, subacute diarrhea: Patient reports over a month of frequent diarrhea.  This is been associated with left lower quadrant pain described as aching constant.  He says he saw [facility] and had EGD and colonoscopy along with other studies including stool studies.  He says that they did not find out what was wrong and they started him on a new medicine last week that is not helping slow the diarrhea.  CT of the abdomen pelvis shows ascites and some nonspecific wall thickening of the right hemicolon, colitis in the differential although this may be from ascites and third spacing.  He is nontender on the right so I be surprised if this represented colitis in this area.  He has benign abdominal exam other than distention  and some mild left lower quadrant tenderness.  Really unclear what is causing the diarrhea here, but I doubt infection given his reported previous work-up.  He is afebrile currently.  Has mild leukopenia from his cirrhosis.  -[facility] consult  -Imodium as needed  -oxycodone and IV Dilaudid as needed for pain  -Diagnostic paracentesis done by Dr. Patel in the ER, fluid sent for Gram stain and culture.  Also sent for cell count and if suspicious for SBP would start antibiotics for this    Cirrhosis: Patient has history of cirrhosis presently since 2017 per imaging review, although he cannot tell me what is from.  He follows with [facility].  He does not drink alcohol.  As above CT shows cirrhotic appearing liver with ascites, portal hypertension, very prominent paraesophageal varices, and splenomegaly.  He also has low albumin state, slightly elevated INR, and pancytopenia.  Bilirubin not elevated and AST is only slightly elevated at 47, alk phos 159.  -Start furosemide 40 mg daily and spironolactone 25 mg daily starting tonight  -BMP tomorrow  -This is a GI consult    HTN: PTA I believe on chlorthalidone 25 mg daily per care everywhere.  Blood pressure 107/98 initially.  -Stop chlorthalidone as I am starting Lasix and spironolactone as above    Pancytopenia: WBC 3.4, hemoglobin 10.9, platelet count 57.  Highly suspect secondary to cirrhosis.  -CBC tomorrow    IDDM type II: PTA on metformin 500 mg twice daily and glargine 16 units in the morning.  Blood sugars usually range 110-150.  No recent A1c on file.  -Resume glargine 16 units in the morning along with medium dose sliding scale insulin  -Hold metformin while inpatient    History nephrolithiasis     History of subdural hematoma       DVT Prophylaxis: Pneumatic Compression Devices  Code Status: Full Code  FEN: 2 g sodium restriction, no IV fluids  Discharge Dispo: Home  Estimated Disch Date / # of Days until Disch: Admit inpatient for large pleural effusion,  hypoxia, and anasarca.  Anticipate minimal 2-3 nights hospitalization for diuresis and thoracentesis.      History of Present Illness:  Ramirez Leslie is a 75 year old male with PMH including cirrhosis, HTN, nephrolithiasis, IDDM type II, and subdural hematoma who presents with abdominal pain, diarrhea, shortness of breath.  The patient symptoms have been ongoing for the past 3 to 5 weeks although he is a vague historian overall.  He has been having left lower quadrant abdominal pain that is constant and achy associated with frequent diarrhea for at least 4 weeks.  He denies any melena or hematochezia.  He says his been following with [facility] and had EGD and colonoscopy during this time along with blood test and stool studies.  He said he was not given any answers for the diarrhea or abdominal pain.  He said he started a medication this last week to help slow the diarrhea, but it is not helping.  He denies any fevers or chills.  He has been progressively more short of breath for the past few weeks especially with exertion.  He occasionally has a dry cough.  Denies any sick contacts or exposure to anyone with COVID19 that he is aware of.  He has noticed that his legs are getting more swollen and his abdomen is more distended despite losing weight.  He is not having any nausea or vomiting.    History obtained from patient, medical record, and from Dr. Patel in the emergency department.  Blood pressure 107/98, heart rate 86, temperature 98.3  F, oxygen 88% on room air at rest worse with exertion.  His BNP is within normal limits.  Albumin is 2.6, alk phos 159, bilirubin 1.3, ALT 32, AST 47.  Blood glucose is 134. WBC 3.4, hemoglobin 10.9, platelet count 57.  INR is 1.29.  Chest x-ray shows large right-sided pleural effusion.  CT of the chest/abdomen/pelvis with contrast shows a large right pleural effusion compressing the right middle and right lower lobe, moderate ascites, cirrhosis with paraesophageal  varices, splenomegaly, and some possible thickening of the right colon which may represent colitis versus just edema from his ascites.  Overall suspect his shortness of breath is from his large pleural effusion causing compression atelectasis.  No sign of infection at this time.  Plan for diuresis and thoracentesis when able which may not be until Monday.  Dr. Patel did do a diagnostic paracentesis and fluid is sent for Gram stain and culture along with cell count.  It was straw-colored in appearance.  Hold off on empiric antibiotics until cell count back.  He also received 4 mg of IV morphine for his abdominal pain.  Request for inpatient bed for hypoxia and further work-up.     Past Medical History reviewed:  Past Medical History:   Diagnosis Date     Cirrhosis (H)      HTN (hypertension)      Nephrolithiasis      Subdural hematoma (H)      Type II diabetes mellitus (H)     insulin dependent     Past Surgical History reviewed:  Past Surgical History:   Procedure Laterality Date     APPENDECTOMY       HC LAP,ORCHIECTOMY Left      LITHOTRIPSY       Social History reviewed:  Social History     Tobacco Use     Smoking status: Former Smoker   Substance Use Topics     Alcohol use: Not on file     Social History     Social History Narrative     Not on file     Family History reviewed:  Family History   Problem Relation Age of Onset     Heart Disease Father      Allergies:  No Known Allergies  Medications:  Prior to Admission medications    Medication Sig Last Dose Taking? Auth Provider   atorvastatin (LIPITOR) 20 MG tablet Take 20 mg by mouth daily  Yes Unknown, Entered By History   carvedilol (COREG) 3.125 MG tablet Take 3.125 mg by mouth 2 times daily (with meals) 8/29/2020 at am Yes Unknown, Entered By History   dicyclomine (BENTYL) 20 MG tablet Take 20 mg by mouth 2 times daily 8/29/2020 at am Yes Unknown, Entered By History   metFORMIN (GLUCOPHAGE) 500 MG tablet Take 500 mg by mouth 2 times daily (with meals)  8/29/2020 at am Yes Unknown, Entered By History   omeprazole (PRILOSEC) 20 MG DR capsule Take 20 mg by mouth 2 times daily 8/29/2020 at am Yes Unknown, Entered By History     Review of Systems:  A Comprehensive greater than 10 system review of systems was carried out.  Pertinent positives and negatives are noted above.  Otherwise negative.     Physical Exam:  Blood pressure 128/52, pulse 87, temperature 98.3  F (36.8  C), temperature source Oral, resp. rate 23, weight 93 kg (205 lb), SpO2 (!) 88 %.  Wt Readings from Last 1 Encounters:   08/29/20 93 kg (205 lb)     Exam:  Constitutional: Awake, NAD   Eyes: sclera white   HEENT: atraumatic, MMM  Respiratory: Substantially decreased breath sounds over right lung field, no focal crackles or wheeze  Cardiovascular: RRR.  No murmur   GI: Moderately distended, mildly tender in the left lower quadrant to palpation without guarding, bowel sounds present  Skin: no rash.  No jaundice  Musculoskeletal/extremities: atraumatic, no major deformities.  1-2+ bilateral lower extremity pitting edema  Neurologic: A&O, speech clear, moves extremities equally  Psychiatric: calm, cooperative, normal affect    Lab and imaging data personally reviewed:  Labs:  Recent Labs   Lab 08/29/20 1931   WBC 3.4*   HGB 10.9*   HCT 35.1*   MCV 86   PLT 57*     Recent Labs   Lab 08/29/20 1931      POTASSIUM 3.7   CHLORIDE 111*   CO2 24   ANIONGAP 7   *   BUN 11   CR 0.82   GFRESTIMATED 87   GFRESTBLACK >90   ADIS 8.3*   PROTTOTAL 7.2   ALBUMIN 2.6*   BILITOTAL 1.3   ALKPHOS 159*   AST 47*   ALT 32     Recent Labs   Lab 08/29/20 1931   INR 1.29*       Imaging:  Recent Results (from the past 24 hour(s))   XR Chest Port 1 View    Narrative    EXAM: XR CHEST PORT 1 VW  LOCATION: [facility]  DATE/TIME: 8/29/2020 8:33 PM    INDICATION: Dyspnea. Hypoxia.  COMPARISON: None.      Impression    IMPRESSION: Dense opacification involving the majority of inferior right hemithorax most  suggestive of a moderately large right pleural effusion with accompanying atelectasis/consolidation of right middle and lower lobes. Right apex remains aerated. Left   lung clear. There is no shift of midline borderline cardiomegaly.   CT Chest/Abdomen/Pelvis w Contrast    Narrative    EXAM: CT CHEST/ABDOMEN/PELVIS W CONTRAST  LOCATION: [facility]  DATE/TIME: 8/29/2020 9:20 PM    INDICATION: Hypoxia. Pleural effusion. Left lower quadrant pain  COMPARISON: Abdominal CT 07/21/2006  TECHNIQUE: CT scan of the chest, abdomen, and pelvis was performed following injection of IV contrast. Multiplanar reformats were obtained. Dose reduction techniques were used.   CONTRAST: 100mL Isovue-370    FINDINGS:   LUNGS AND PLEURA: Large right pleural effusion with complete atelectasis of right middle and right lower lobes calcified granulomata present within right lung base. No lung or pleural mass appreciated. Aerated portions of right upper lobe are   unremarkable.  Minimal changes of emphysema evident linear peripheral sites of fibrosis.  MEDIASTINUM/AXILLAE: No significant lymphadenopathy. Prominent. Esophageal varices. Normal-sized heart.    HEPATOBILIARY: Small cirrhotic liver with recanalized umbilical vein and esophageal varices and a small splenorenal shunt related to portal venous hypertension.  PANCREAS: Normal.  SPLEEN: Prominent splenomegaly. Calcified granulomata.  ADRENAL GLANDS: Normal.  KIDNEYS/BLADDER: There are 3 stones present within each kidney; largest on the left measures 6 x 4 mm and largest on the right measures 4 x 3 mm. No hydronephrosis.    BOWEL: No obstruction. There does appear to be modest wall thickening involving the right hemicolon which is nonspecific given the ascites and third spacing right hemicolectomy colitis can't be excluded. Mild diffuse congestion mesentery. Motion   artifact. Modest diffuse ascites.  LYMPH NODES: No significant lymphadenopathy.  VASCULATURE: Heavy  atherosclerotic plaque.  PELVIC ORGANS: Mild ascites.    MUSCULOSKELETAL: Unremarkable.      Impression    IMPRESSION:  1.  Moderately large right pleural effusion with atelectasis of right middle and lower lobes. No suspicious chest mass evident.  2.  Cirrhosis and portal venous hypertension. Very prominent paraesophageal varices are present. Splenomegaly and modest diffuse ascites.  3.  Mild nonspecific wall thickening of right hemicolon likely relates to ascites and third spacing though colitis also possible.  4.  Bilateral kidney stones. No hydroureter persists.       Marcelino Brown MD  Hospitalist  [facility]

## 2020-08-30 NOTE — PLAN OF CARE
Pt is alert, forgetful at times. Up indep to bedside commode. Reported mild pain to LLQ. Repositioning provided. Denied pain medications. Bowels active, tender to LUQ. Last BM 8-29. No appetite. Encouraging to eat. Reported that he gets nauseous when he eats. Given zofran prior to lunch. Was able to tolerate toast W/butter.  Ordered boost supplement. BS 73, started hypoglycemic protocol. BS went up to 101 after drinking 8oz OJ. Lunch BS 95, 172, 143. Lungs diminished. Nonproductive cough. Reporting SOB with rest/exertion. Requiring 5L oximask. Thoracentesis and paracentesis ordered for tomorrow. + 2 pitting edema to BLE. Replaced magnesium. Recheck result 2.2.  Will continue to monitor

## 2020-08-30 NOTE — PROGRESS NOTES
I was contacted for critical Diagnostic Studies and Labs value:  Platelet Count   Date Value Ref Range Status   08/30/2020 41 (LL) 150 - 450 10e9/L Final     Comment:     This result has been called to [facility] by Reina Vazquez on 08 30 2020 at 0701, and has been read back.        Abnormal result acknowledged.  EMR reviewed   Plan: continue to monitor for now.

## 2020-08-30 NOTE — ED NOTES
[facility]  ED Nurse Handoff Report    Ramirez Leslie is a 75 year old male   ED Chief complaint: Abdominal Pain  . ED Diagnosis:   Final diagnoses:   LLQ abdominal pain   Hypoxia   Pleural effusion     Allergies: No Known Allergies    Code Status: Full Code  Activity level - Baseline/Home:  Independent. Activity Level - Current:   Assist X 1. Lift room needed: No. Bariatric: No   Needed: No   Isolation: No. Infection: Not Applicable.     Vital Signs:   Vitals:    08/29/20 2045 08/29/20 2100 08/29/20 2115 08/29/20 2145   BP: (!) 149/74 137/76 (!) 143/70 128/52   Pulse: 91 71 79 87   Resp: 27 25 24 23   Temp:       TempSrc:       SpO2: (!) 85% 94% 95% (!) 88%   Weight:           Cardiac Rhythm:  ,      Pain level: 0-10 Pain Scale: 7  Patient confused: No. Patient Falls Risk: Yes.   Elimination Status: Has voided   Patient Report - Initial Complaint: Abd pain. Focused Assessment:   Tests Performed: lab/imaging. Abnormal Results:   Labs Ordered and Resulted from Time of ED Arrival Up to the Time of Departure from the ED   CBC WITH PLATELETS DIFFERENTIAL - Abnormal; Notable for the following components:       Result Value    WBC 3.4 (*)     RBC Count 4.08 (*)     Hemoglobin 10.9 (*)     Hematocrit 35.1 (*)     MCHC 31.1 (*)     RDW 17.4 (*)     Platelet Count 57 (*)     Absolute Lymphocytes 0.3 (*)     All other components within normal limits   COMPREHENSIVE METABOLIC PANEL - Abnormal; Notable for the following components:    Chloride 111 (*)     Glucose 134 (*)     Calcium 8.3 (*)     Albumin 2.6 (*)     Alkaline Phosphatase 159 (*)     AST 47 (*)     All other components within normal limits   INR - Abnormal; Notable for the following components:    INR 1.29 (*)     All other components within normal limits   PARTIAL THROMBOPLASTIN TIME   ROUTINE UA WITH MICROSCOPIC   COVID-19 VIRUS (CORONAVIRUS) BY PCR   PERIPHERAL IV CATHETER       Gastrointestinal Gastrointestinal - GI WDL: all   Abdominal Palpation:  (LLQ abd pain/diarrhea-blood noted in stool ) TD       20:00 Musculoskeletal Musculoskeletal - Musculoskeletal WDL: -WDL except (bilateral lower ext moderated edema) TD     20:00 Custom Formula Data Other flowsheet entries - Pulse Review: 73  TD     20:00 Respiratory Respiratory - Respiratory WDL: all  Rhythm/Pattern, Respiratory: shallow; shortness of breath (worse with activity and lying flat         Treatments provided: MAR  Family Comments: wife went home, please call with updates  OBS brochure/video discussed/provided to patient:  N/A  ED Medications:   Medications   morphine (PF) injection 4 mg (has no administration in time range)   lidocaine 1% with EPINEPHrine 1:100,000 1 %-1:025321 injection (has no administration in time range)   iopamidol (ISOVUE-370) solution 500 mL (100 mLs Intravenous Given 8/29/20 2125)   sodium chloride 0.9 % bag 500mL for CT scan flush use (65 mLs Intravenous Given 8/29/20 2125)     Drips infusing:  No  For the majority of the shift, the patient's behavior Green. Interventions performed were .    Sepsis treatment initiated: No       ED Nurse Name/Phone Number: Korin Bañuelos RN,   11:00 PM    RECEIVING UNIT ED HANDOFF REVIEW    Above ED Nurse Handoff Report was reviewed: Yes  Reviewed by: Brielle Augustin RN on August 30, 2020 at 12:11 AM

## 2020-08-31 ENCOUNTER — APPOINTMENT (OUTPATIENT)
Dept: ULTRASOUND IMAGING | Facility: CLINIC | Age: 75
DRG: 432 | End: 2020-08-31
Attending: INTERNAL MEDICINE
Payer: COMMERCIAL

## 2020-08-31 LAB
ALBUMIN SERPL-MCNC: 3.2 G/DL (ref 3.4–5)
ALP SERPL-CCNC: 102 U/L (ref 40–150)
ALT SERPL W P-5'-P-CCNC: 22 U/L (ref 0–70)
ANION GAP SERPL CALCULATED.3IONS-SCNC: 1 MMOL/L (ref 3–14)
APPEARANCE FLD: NORMAL
AST SERPL W P-5'-P-CCNC: 24 U/L (ref 0–45)
BACTERIA SPEC CULT: NORMAL
BILIRUB SERPL-MCNC: 1.2 MG/DL (ref 0.2–1.3)
BUN SERPL-MCNC: 10 MG/DL (ref 7–30)
CALCIUM SERPL-MCNC: 7.7 MG/DL (ref 8.5–10.1)
CHLORIDE SERPL-SCNC: 109 MMOL/L (ref 94–109)
CO2 SERPL-SCNC: 30 MMOL/L (ref 20–32)
COLOR FLD: NORMAL
CREAT SERPL-MCNC: 0.77 MG/DL (ref 0.66–1.25)
EOSINOPHIL NFR FLD MANUAL: 1 %
ERYTHROCYTE [DISTWIDTH] IN BLOOD BY AUTOMATED COUNT: 17.2 % (ref 10–15)
GFR SERPL CREATININE-BSD FRML MDRD: 89 ML/MIN/{1.73_M2}
GLUCOSE BLDC GLUCOMTR-MCNC: 105 MG/DL (ref 70–99)
GLUCOSE BLDC GLUCOMTR-MCNC: 107 MG/DL (ref 70–99)
GLUCOSE BLDC GLUCOMTR-MCNC: 136 MG/DL (ref 70–99)
GLUCOSE BLDC GLUCOMTR-MCNC: 80 MG/DL (ref 70–99)
GLUCOSE BLDC GLUCOMTR-MCNC: 83 MG/DL (ref 70–99)
GLUCOSE FLD-MCNC: 116 MG/DL
GLUCOSE SERPL-MCNC: 93 MG/DL (ref 70–99)
GRAM STN SPEC: NORMAL
GRAM STN SPEC: NORMAL
HCT VFR BLD AUTO: 28.9 % (ref 40–53)
HGB BLD-MCNC: 8.5 G/DL (ref 13.3–17.7)
LACTATE BLD-SCNC: 1.2 MMOL/L (ref 0.7–2)
LDH FLD L TO P-CCNC: 80 U/L
LYMPHOCYTES NFR FLD MANUAL: 29 %
Lab: NORMAL
MAGNESIUM SERPL-MCNC: 1.9 MG/DL (ref 1.6–2.3)
MCH RBC QN AUTO: 26.1 PG (ref 26.5–33)
MCHC RBC AUTO-ENTMCNC: 29.4 G/DL (ref 31.5–36.5)
MCV RBC AUTO: 89 FL (ref 78–100)
MONOS+MACROS NFR FLD MANUAL: 53 %
NEUTS BAND NFR FLD MANUAL: 14 %
OTHER CELLS FLD MANUAL: 3 %
PLATELET # BLD AUTO: 39 10E9/L (ref 150–450)
POTASSIUM SERPL-SCNC: 3.6 MMOL/L (ref 3.4–5.3)
PROT FLD-MCNC: 2 G/DL
PROT SERPL-MCNC: 6.5 G/DL (ref 6.8–8.8)
RBC # BLD AUTO: 3.26 10E12/L (ref 4.4–5.9)
SODIUM SERPL-SCNC: 140 MMOL/L (ref 133–144)
SPECIMEN SOURCE FLD: NORMAL
SPECIMEN SOURCE: NORMAL
SPECIMEN SOURCE: NORMAL
WBC # BLD AUTO: 2 10E9/L (ref 4–11)
WBC # FLD AUTO: 986 /UL

## 2020-08-31 PROCEDURE — P9047 ALBUMIN (HUMAN), 25%, 50ML: HCPCS | Performed by: INTERNAL MEDICINE

## 2020-08-31 PROCEDURE — 88112 CYTOPATH CELL ENHANCE TECH: CPT | Mod: 26 | Performed by: INTERNAL MEDICINE

## 2020-08-31 PROCEDURE — 88305 TISSUE EXAM BY PATHOLOGIST: CPT | Mod: 26 | Performed by: INTERNAL MEDICINE

## 2020-08-31 PROCEDURE — 76705 ECHO EXAM OF ABDOMEN: CPT

## 2020-08-31 PROCEDURE — 82945 GLUCOSE OTHER FLUID: CPT | Performed by: INTERNAL MEDICINE

## 2020-08-31 PROCEDURE — 88305 TISSUE EXAM BY PATHOLOGIST: CPT | Performed by: INTERNAL MEDICINE

## 2020-08-31 PROCEDURE — 85027 COMPLETE CBC AUTOMATED: CPT | Performed by: INTERNAL MEDICINE

## 2020-08-31 PROCEDURE — 00000155 ZZHCL STATISTIC H-CELL BLOCK W/STAIN: Performed by: INTERNAL MEDICINE

## 2020-08-31 PROCEDURE — 0W993ZZ DRAINAGE OF RIGHT PLEURAL CAVITY, PERCUTANEOUS APPROACH: ICD-10-PCS | Performed by: RADIOLOGY

## 2020-08-31 PROCEDURE — 99207 ZZC CDG-CUT & PASTE-POTENTIAL IMPACT ON LEVEL: CPT | Performed by: INTERNAL MEDICINE

## 2020-08-31 PROCEDURE — 25000128 H RX IP 250 OP 636: Performed by: INTERNAL MEDICINE

## 2020-08-31 PROCEDURE — 83605 ASSAY OF LACTIC ACID: CPT | Performed by: INTERNAL MEDICINE

## 2020-08-31 PROCEDURE — 84157 ASSAY OF PROTEIN OTHER: CPT | Performed by: INTERNAL MEDICINE

## 2020-08-31 PROCEDURE — 12000000 ZZH R&B MED SURG/OB

## 2020-08-31 PROCEDURE — 00000146 ZZHCL STATISTIC GLUCOSE BY METER IP

## 2020-08-31 PROCEDURE — 99233 SBSQ HOSP IP/OBS HIGH 50: CPT | Performed by: INTERNAL MEDICINE

## 2020-08-31 PROCEDURE — 83735 ASSAY OF MAGNESIUM: CPT | Performed by: INTERNAL MEDICINE

## 2020-08-31 PROCEDURE — 83615 LACTATE (LD) (LDH) ENZYME: CPT | Performed by: INTERNAL MEDICINE

## 2020-08-31 PROCEDURE — 87205 SMEAR GRAM STAIN: CPT | Performed by: INTERNAL MEDICINE

## 2020-08-31 PROCEDURE — 25000132 ZZH RX MED GY IP 250 OP 250 PS 637: Performed by: INTERNAL MEDICINE

## 2020-08-31 PROCEDURE — 36415 COLL VENOUS BLD VENIPUNCTURE: CPT | Performed by: INTERNAL MEDICINE

## 2020-08-31 PROCEDURE — 87070 CULTURE OTHR SPECIMN AEROBIC: CPT | Performed by: INTERNAL MEDICINE

## 2020-08-31 PROCEDURE — 89051 BODY FLUID CELL COUNT: CPT | Performed by: INTERNAL MEDICINE

## 2020-08-31 PROCEDURE — 00000102 ZZHCL STATISTIC CYTO WRIGHT STAIN TC: Performed by: INTERNAL MEDICINE

## 2020-08-31 PROCEDURE — 32555 ASPIRATE PLEURA W/ IMAGING: CPT

## 2020-08-31 PROCEDURE — 80053 COMPREHEN METABOLIC PANEL: CPT | Performed by: INTERNAL MEDICINE

## 2020-08-31 PROCEDURE — 88112 CYTOPATH CELL ENHANCE TECH: CPT | Performed by: INTERNAL MEDICINE

## 2020-08-31 RX ADMIN — CEFTRIAXONE 2 G: 2 INJECTION, POWDER, FOR SOLUTION INTRAMUSCULAR; INTRAVENOUS at 00:32

## 2020-08-31 RX ADMIN — SPIRONOLACTONE 25 MG: 25 TABLET ORAL at 08:09

## 2020-08-31 RX ADMIN — OMEPRAZOLE 20 MG: 20 CAPSULE, DELAYED RELEASE ORAL at 08:09

## 2020-08-31 RX ADMIN — OMEPRAZOLE 20 MG: 20 CAPSULE, DELAYED RELEASE ORAL at 19:17

## 2020-08-31 RX ADMIN — FUROSEMIDE 40 MG: 40 TABLET ORAL at 08:09

## 2020-08-31 RX ADMIN — ALBUMIN HUMAN 25 G: 0.25 SOLUTION INTRAVENOUS at 00:10

## 2020-08-31 ASSESSMENT — ACTIVITIES OF DAILY LIVING (ADL)
ADLS_ACUITY_SCORE: 13

## 2020-08-31 NOTE — PROGRESS NOTES
[facility]  Hospitalist Progress Note  Shankar Fair MD 08/31/2020    Reason for Stay        Acute hypoxemic respiratory failure    Liver disease with anasarca    Large right pleural effusion    Spontaneous bacterial peritonitis    Pancytopenia    Type 2 diabetes          Assessment and Plan:        Summary of Stay:     Ramirez Leslie is a 75 year old male with PMH including cirrhosis, HTN, nephrolithiasis, IDDM type II, and subdural hematoma who presents with abdominal pain, diarrhea, shortness of breath.   Patient has anasarca with evidence of large right pleural effusion with shortness of breath and hypoxia, ascites, bilateral lower extremity edema and generalized weakness.    Patient progress during stay:    Patient was admitted and diagnostic  fluid analysis ascites showed evidence of spontaneous bacterial peritonitis with polymorphonuclear cells of over 700.  Patient was started on intravenous ceftriaxone.  He was treated with oxygen and closely monitored in the hospital.  Gastroenterology team was consulted    Problem List with Assessment and Plan      1. Acute hypoxic respiratory failure secondary to  right large pleural effusion    Continue oxygen for now, therapeutic thoracentesis today     2. Ascites with spontaneous bacterial peritonitis      Continue ceftriaxone, monitor cultures    May need paracentesis tomorrow as well    3.  Abdominal pain likely secondary to #2: Improving    4.  Alcoholic liver disease with cirrhosis and complications including pancytopenia, ascites, pleural effusion: GI team consulted for further assistance  - input appreciated     5.  Pancytopenia: No bleeding.  Continue to monitor for now    6.  Hypertension: Soft blood pressure.  Continue Lasix and spironolactone with parameters    7.  Type 2 diabetes: Metformin and glargine at home: Blood sugars running low this morning.  Will hold Lantus, continue sliding scale insulin, continue to hold metformin.   Continue to monitor blood sugar    8.  SARS-CoV-2 PCR negative        Plan for today :  Thoracentesis as well as paracentesis         LDA     Access : Peripheral     [place] Catheter: not present        FEN :    Orders Placed This Encounter      2 Gram Sodium Diet           Intake/Output Summary (Last 24 hours) at 8/30/2020 1228  Last data filed at 8/30/2020 1202  Gross per 24 hour   Intake 472 ml   Output 2675 ml   Net -2203 ml          DVT Prophylaxis: Pneumatic Compression Devices    Code Status:  Full Code    Estimated discharge  disposition and plan: Home likely in the next 1-2 days        Interval History (Subjective):        Patient is seen and examined by me today and medical record reviewed.Overnight events noted and care discussed with nursing staff.  Denies new symptoms but has been sob and requiring 5 L of oxygen                   Physical Exam:        Last Vital Signs:  /55   Pulse 72   Temp 98.7  F (37.1  C) (Temporal)   Resp 20   Wt 91.6 kg (202 lb)   SpO2 96%     I/O last 3 completed shifts:  In: 1070 [P.O.:1070]  Out: 3050 [Urine:3050]    Wt Readings from Last 5 Encounters:   08/31/20 91.6 kg (202 lb)        Constitutional: Awake, alert, cooperative, no apparent distress     Respiratory:  No increased work of breathing.  Decreased air entry bilaterally more on the right   Cardiovascular: Regular rate and rhythm, normal S1 and S2, and no murmur noted   Abdomen:  Distended abdomen with evidence of ascites.  No significant tenderness or rebound tenderness.  Soft, no guarding or rigidity   Skin: No rashes, no cyanosis, dry to touch   Neuro: Alert with  no weakness, numbness, memory loss   Extremities:  Bilateral pitting edema   Other(s):        All other systems: Negative          Medications:        All current medications were reviewed with changes reflected in problem list.         Data:      All new lab and imaging data was reviewed.      Data reviewed today: I reviewed all new labs and  imaging results over the last 24 hours. I personally reviewed no images or EKG's today      Recent Labs   Lab 08/31/20  0615 08/30/20  0633 08/29/20 1931   WBC 2.0* 2.6* 3.4*   HGB 8.5* 9.8* 10.9*   HCT 28.9* 32.7* 35.1*   MCV 89 88 86   PLT 39* 41* 57*     Recent Labs   Lab 08/29/20  2343 08/29/20  2309   CULT <10,000 colonies/mL  mixed urogenital reahna   Culture negative monitoring continues     Recent Labs   Lab 08/31/20  0615 08/30/20  1421 08/30/20  0633 08/29/20 1931     --  142 142   POTASSIUM 3.6  --  3.6 3.7   CHLORIDE 109  --  112* 111*   CO2 30  --  26 24   ANIONGAP 1*  --  4 7   GLC 93  --  92 134*   BUN 10  --  11 11   CR 0.77  --  0.77 0.82   GFRESTIMATED 89  --  89 87   GFRESTBLACK >90  --  >90 >90   ADIS 7.7*  --  7.7* 8.3*   MAG 1.9 2.2 1.4*  --    PROTTOTAL 6.5*  --  6.2* 7.2   ALBUMIN 3.2*  --   --  2.6*   BILITOTAL 1.2  --   --  1.3   ALKPHOS 102  --   --  159*   AST 24  --   --  47*   ALT 22  --   --  32       Recent Labs   Lab 08/31/20  1308 08/31/20  0724 08/31/20  0615 08/31/20  0154 08/30/20  2108 08/30/20  1733  08/30/20  0633  08/29/20 1931   GLC  --   --  93  --   --   --   --  92  --  134*   BGM 80 83  --  105* 94 99   < >  --    < >  --     < > = values in this interval not displayed.       Recent Labs   Lab 08/29/20 1931   INR 1.29*         No results for input(s): TROPONIN, TROPI, TROPR in the last 168 hours.    Invalid input(s): TROP, TROPONINIES    Recent Results (from the past 48 hour(s))   XR Chest Port 1 View    Narrative    EXAM: XR CHEST PORT 1 VW  LOCATION: [facility]  DATE/TIME: 8/29/2020 8:33 PM    INDICATION: Dyspnea. Hypoxia.  COMPARISON: None.      Impression    IMPRESSION: Dense opacification involving the majority of inferior right hemithorax most suggestive of a moderately large right pleural effusion with accompanying atelectasis/consolidation of right middle and lower lobes. Right apex remains aerated. Left   lung clear. There is no shift of  midline borderline cardiomegaly.   CT Chest/Abdomen/Pelvis w Contrast    Narrative    EXAM: CT CHEST/ABDOMEN/PELVIS W CONTRAST  LOCATION: [facility]  DATE/TIME: 8/29/2020 9:20 PM    INDICATION: Hypoxia. Pleural effusion. Left lower quadrant pain  COMPARISON: Abdominal CT 07/21/2006  TECHNIQUE: CT scan of the chest, abdomen, and pelvis was performed following injection of IV contrast. Multiplanar reformats were obtained. Dose reduction techniques were used.   CONTRAST: 100mL Isovue-370    FINDINGS:   LUNGS AND PLEURA: Large right pleural effusion with complete atelectasis of right middle and right lower lobes calcified granulomata present within right lung base. No lung or pleural mass appreciated. Aerated portions of right upper lobe are   unremarkable.  Minimal changes of emphysema evident linear peripheral sites of fibrosis.  MEDIASTINUM/AXILLAE: No significant lymphadenopathy. Prominent. Esophageal varices. Normal-sized heart.    HEPATOBILIARY: Small cirrhotic liver with recanalized umbilical vein and esophageal varices and a small splenorenal shunt related to portal venous hypertension.  PANCREAS: Normal.  SPLEEN: Prominent splenomegaly. Calcified granulomata.  ADRENAL GLANDS: Normal.  KIDNEYS/BLADDER: There are 3 stones present within each kidney; largest on the left measures 6 x 4 mm and largest on the right measures 4 x 3 mm. No hydronephrosis.    BOWEL: No obstruction. There does appear to be modest wall thickening involving the right hemicolon which is nonspecific given the ascites and third spacing right hemicolectomy colitis can't be excluded. Mild diffuse congestion mesentery. Motion   artifact. Modest diffuse ascites.  LYMPH NODES: No significant lymphadenopathy.  VASCULATURE: Heavy atherosclerotic plaque.  PELVIC ORGANS: Mild ascites.    MUSCULOSKELETAL: Unremarkable.      Impression    IMPRESSION:  1.  Moderately large right pleural effusion with atelectasis of right middle and lower  lobes. No suspicious chest mass evident.  2.  Cirrhosis and portal venous hypertension. Very prominent paraesophageal varices are present. Splenomegaly and modest diffuse ascites.  3.  Mild nonspecific wall thickening of right hemicolon likely relates to ascites and third spacing though colitis also possible.  4.  Bilateral kidney stones. No hydroureter persists.         Disclaimer: This note consists of symbols derived from keyboarding, dictation and/or voice recognition software. As a result, there may be errors in the script that have gone undetected. Please consider this when interpreting information found in this chart.

## 2020-08-31 NOTE — PROGRESS NOTES
Right thoracentesis completed per Dr. Edgar for 2100 clear dark delmy fluid, patient tolerated well. Fluid to lab for diagnostics as ordered. Patient transferred to room via cart in stable condition.

## 2020-08-31 NOTE — PLAN OF CARE
DAy RN  Vss, A and O  Am prior to thoracentesis shallow breathing at rest stated mild SOB,  RR 20-26 at rest and with activity MCGINNIS, R lung is dim  02 needed at 5L to keep sats >90 and for ease of breathing.  After Thoracentesis 3-4L of 02 nasal canula, stated easier to breath after procedure,  MCGINNIS better but still present, RR 18-20  Uses IS with encouragement 5-10 times an hour, can get the IS to 500-1000  Slept most of am  Thoracentesis site WDL   Up with SBA to bathroom and to edge of bed  Declined to eat in am ate a good lunch BG's  In the 80's today

## 2020-08-31 NOTE — PROGRESS NOTES
[facility]  [facility]  Gastroenterology Progress Note    Interval History:    Patient denies abdominal pain or trouble breathing. Feels bloated. Awaiting thoracentesis / paracentesis today.    Physical Exam:    /48 (BP Location: Left arm)   Pulse 66   Temp 98.5  F (36.9  C) (Temporal)   Resp 16   Wt 91.6 kg (202 lb)   SpO2 96%   Temp (24hrs), Av.7  F (36.5  C), Min:97.2  F (36.2  C), Max:98.5  F (36.9  C)    Patient Vitals for the past 72 hrs:   Weight   20 0700 91.6 kg (202 lb)   20 0550 91.5 kg (201 lb 12.8 oz)   20 0033 92.8 kg (204 lb 9.6 oz)   20 1856 93 kg (205 lb)       Intake/Output Summary (Last 24 hours) at 2020 0840  Last data filed at 2020 0800  Gross per 24 hour   Intake 1190 ml   Output 2900 ml   Net -1710 ml       Constitutional: Elderly male. No acute distress.  Cardiovascular: RRR.  Respiratory: Effort normal. O2 mask in place.  Abdomen: Ascites. Nontender.  Skin: No jaundice.    Additional Comments:  ROS, FH, SH: See initial GI consult for details.    Laboratory Data:  Recent Labs   Lab Test 20  0615 20  0633 20   WBC 2.0* 2.6* 3.4*   HGB 8.5* 9.8* 10.9*   MCV 89 88 86   PLT 39* 41* 57*   INR  --   --  1.29*     Recent Labs   Lab Test 20  0615 20  0633 20    142 142   POTASSIUM 3.6 3.6 3.7   CHLORIDE 109 112* 111*   CO2 30 26 24   BUN 10 11 11   CR 0.77 0.77 0.82   ANIONGAP 1* 4 7   ADIS 7.7* 7.7* 8.3*     Recent Labs   Lab Test 20  0615 20  2343 20   ALBUMIN 3.2*  --  2.6*   BILITOTAL 1.2  --  1.3   ALT 22  --  32   AST 24  --  47*   ALKPHOS 102  --  159*   PROTEIN  --  30*  --        Imaging / Endoscopy:    CT CAP with Contrast 20   - Moderately large right pleural effusion with atelectasis of right middle and lower lobes. No suspicious chest mass evident.   - Cirrhosis and portal venous hypertension. Very prominent paraesophageal  varices are present. Splenomegaly and modest diffuse ascites.   - Mild nonspecific wall thickening of right hemicolon likely relates to ascites and third spacing though colitis also possible.   - Bilateral kidney stones. No hydroureter persists.    Assessment & Plan:  Ramirez Leslie is a 75-year-old male with PMH including ALEXANDER cirrhosis, chronic diarrhea, and type 2 diabetes who was admitted 20 with abdominal pain, shortness of breath, and diarrhea.    1) Decompensated ALEXANDER cirrhosis: Complicated by esophageal varices, ascites, likely hepatic hydrothorax, pancytopenia. Diagnostic paracentesis c/w SBP (PMNs 716). Treating with ceftriaxone. Diuretics started this admission.        - Continue ceftriaxone x 5 days. Will need prophylaxis going forward.        - Continue Lasix/Aldactone with close monitoring of renal function. High risk for HRS given SBP. If renal function remains stable can increase Lasix:Aldactone to 40mmg dose.        - Paracentesis / thoracentesis planned for today. Please replace albumin appropriately.        - Received albumin 1.5 mg/kg on ; needs another dose albumin 1 mg/kg on .        - Plan outpatient [facility] with [facility].    2) Chronic diarrhea / dark stools: Extensive workup including EGD, colonoscopy, CT, capsule, stool studies, TSH/TTG all negative. Fiber, cholestyramine, dicyclomine not helpful. Imodium previously helpful but caused patient to feel sick to his stomach; he does not recall taking this.        - Imodium PRN.    Discussed with Dr. Schrader.    Brielle Galloway PA-C  [facility] ([facility])

## 2020-08-31 NOTE — PLAN OF CARE
Pt A/O, C/O minimal abdominal pain in LLQ R?T ascites. HR reg, no CP, pulses palpable, +2 pedal edema, no sensation deficits, on Lasix and aldactone. 5L oximask, SOB with activity, sats adequate, occasional dry cough, using I.S. Large RT pleural effusion. Lungs dim in bases, RT faint lung sounds in base. BS hyperactive, no N/V, poor appetite, BG 99, 94, denies passing gas, LBM yesterday diarrhea, Immodium PRN, up to BSC with SBA 1, voiding in urinal, clear and delmy urine. Strict I/O. Albumin given x3 bags, mid way into bag #4, IV needed replacement, Awaiting assistance on IV start. Bruise to L upper arm. GI team following. Plan for paracenthesis/thoracentesis tomorrow. Positive for spontaneous bacterial peritonitis.

## 2020-08-31 NOTE — CONSULTS
Care Transition Initial Assessment - RN        Met with: Patient.  DATA   Active Problems:    Acute hypoxemic respiratory failure (H)       Cognitive Status: awake, alert and oriented.  Primary Care Clinic Name: [facility]  Primary Care MD Name: Dr. Richi Kay  Contact information and PCP information verified: Yes  Lives With: spouse      Quality of Family Relationships: involved, supportive  Description of Support System: Supportive, Involved   Who is your support system?: Wife, Children(2 adult daughters)   Support Assessment: Adequate family and caregiver support   Insurance concerns: No Insurance issues identified  ASSESSMENT  Patient currently receives the following services:  none        Identified issues/concerns regarding health management: CTS following for discharge planning. Patient demographics and chart review show no PCP.  Patient admitted for SOB, abd pain, anasarca, large pleural effusion.  Patient s/p thorocentesis 8/31. Patient has a complex medical history including h/o ALEXANDER cirrhosis, chronic diarrhea, prostatectomy and type 2 diabetes. Patient reports he lives at home independently with his wife. Patient ambulates ind w/o cane or walker. Wife and his x2 daughters are supportive and involved in his care. Patient anticipates returning home and denies having any needs for additional support or services.     Patient reports his PCP is Dr. Richi Kay with [facility]. Patient last seen by Dr. Kay in June 2020. State he is also followed by Dr. Angel Roman with [facility]. [facility] also following patient on this admission. Encouraged f/u with his providers. A post hospitalization follow up appointment was schedule with Dr. Kay on September 8th at 10:30 pm.       A handoff will be sent to his PCP care coordination team at discharge. Patient can benefit from care coordination support with disease management with multiple speciality  providers involvement.      PLAN  Financial costs for the patient include none .  Patient given options and choices for discharge yes .  Patient/family is agreeable to the plan?  Yes: home  Patient anticipates discharging to home with wife .        Patient anticipates needs for home equipment: TBD patient currently on 4L NC. Monitor for possible home O2 needs  Transportation/person available to transport on day of discharge  is his wife, Krystal.      Plan/Disposition: Home     Appointments: A post hospitalization follow up appointment was schedule with Dr. Kay on September 8th at 10:30 pm.     Care  (CTS) will continue to follow as needed.    Campbell Salcido RN BSN PHN CCM   Inpatient Care Coordination   [facility]   [phone]

## 2020-08-31 NOTE — DISCHARGE INSTRUCTIONS
A Virtual appointment has been scheduled for you with Dr. Richi Kay at Tuesday, Sept. 8th on 10:30 am. Please note, you do NOT need to go to the clinic for this appointment. The doctor office will email you the link with instructions for your virtual visit.  Please call the clinic at [phone] if you have questions or need to reschedule.     Oxygen Provider:  Arranged through [employer], contact number [phone]. If you have any questions or concerns please call the oxygen company directly.

## 2020-08-31 NOTE — PROCEDURES
[facility]    Procedure: Right thoracentesis    Date/Time: 8/31/2020 12:19 PM  Performed by: Martha Edgar DO  Authorized by: Martha Edgar DO     UNIVERSAL PROTOCOL   Site Marked: Yes  Prior Images Obtained and Reviewed:  Yes  Required items: Required blood products, implants, devices and special equipment available    Patient identity confirmed:  Verbally with patient, arm band, provided demographic data and hospital-assigned identification number  Patient was reevaluated immediately before administering moderate or deep sedation or anesthesia  Confirmation Checklist:  Patient's identity using two indicators, relevant allergies, procedure was appropriate and matched the consent or emergent situation and correct equipment/implants were available  Time out: Immediately prior to the procedure a time out was called    Universal Protocol: the Joint Commission Universal Protocol was followed    Preparation: Patient was prepped and draped in usual sterile fashion           ANESTHESIA    Anesthesia: Local infiltration  Local Anesthetic:  Lidocaine 1% without epinephrine      SEDATION    Patient Sedated: No    See dictated procedure note for full details.  Findings: Right thoracentesis.   Not enough fluid for paracentesis.     Specimens: none and fluid and/or tissue for gram stain and culture    Complications: None    Condition: Stable    PROCEDURE   Patient Tolerance:  Patient tolerated the procedure well with no immediate complications    Length of time physician/provider present for 1:1 monitoring during sedation: 0

## 2020-08-31 NOTE — PLAN OF CARE
A&O x4. VSS. LS diminished. Infrequent cough. MCGINNIS. O2 at 5LPM via oxymask, unable to wean. Voiding at bedside via urinal. On lasix and spirolactone. Up SBA. Edema to BLE. Abdomen distended, mild tenderness to LLQ not requiring intervention. 2gm NA diet. Albumin completed. IV rocephin. Plans for thoracentesis and possible paracentesis today.

## 2020-09-01 ENCOUNTER — DOCUMENTATION ONLY (OUTPATIENT)
Dept: ORTHOPEDICS | Facility: CLINIC | Age: 75
End: 2020-09-01

## 2020-09-01 VITALS
TEMPERATURE: 99.1 F | RESPIRATION RATE: 18 BRPM | HEART RATE: 95 BPM | SYSTOLIC BLOOD PRESSURE: 115 MMHG | WEIGHT: 193.7 LBS | DIASTOLIC BLOOD PRESSURE: 58 MMHG | OXYGEN SATURATION: 94 %

## 2020-09-01 PROBLEM — K65.2 SBP (SPONTANEOUS BACTERIAL PERITONITIS) (H): Status: ACTIVE | Noted: 2020-09-01

## 2020-09-01 LAB
GLUCOSE BLDC GLUCOMTR-MCNC: 118 MG/DL (ref 70–99)
GLUCOSE BLDC GLUCOMTR-MCNC: 119 MG/DL (ref 70–99)
GLUCOSE BLDC GLUCOMTR-MCNC: 75 MG/DL (ref 70–99)
GLUCOSE SERPL-MCNC: 100 MG/DL (ref 70–99)

## 2020-09-01 PROCEDURE — 25000132 ZZH RX MED GY IP 250 OP 250 PS 637: Performed by: INTERNAL MEDICINE

## 2020-09-01 PROCEDURE — 36415 COLL VENOUS BLD VENIPUNCTURE: CPT | Performed by: INTERNAL MEDICINE

## 2020-09-01 PROCEDURE — 82947 ASSAY GLUCOSE BLOOD QUANT: CPT | Performed by: INTERNAL MEDICINE

## 2020-09-01 PROCEDURE — 25000128 H RX IP 250 OP 636: Performed by: INTERNAL MEDICINE

## 2020-09-01 PROCEDURE — 99239 HOSP IP/OBS DSCHRG MGMT >30: CPT | Performed by: INTERNAL MEDICINE

## 2020-09-01 PROCEDURE — 00000146 ZZHCL STATISTIC GLUCOSE BY METER IP

## 2020-09-01 RX ORDER — SPIRONOLACTONE 25 MG/1
25 TABLET ORAL DAILY
Qty: 30 TABLET | Refills: 0 | Status: ON HOLD | OUTPATIENT
Start: 2020-09-02 | End: 2020-01-01

## 2020-09-01 RX ORDER — FUROSEMIDE 40 MG
40 TABLET ORAL DAILY
Qty: 30 TABLET | Refills: 0 | Status: ON HOLD | OUTPATIENT
Start: 2020-09-02 | End: 2020-01-01

## 2020-09-01 RX ORDER — CIPROFLOXACIN 500 MG/1
500 TABLET, FILM COATED ORAL 2 TIMES DAILY
Qty: 14 TABLET | Refills: 0 | Status: SHIPPED | OUTPATIENT
Start: 2020-09-01 | End: 2020-01-01

## 2020-09-01 RX ADMIN — FUROSEMIDE 40 MG: 40 TABLET ORAL at 07:57

## 2020-09-01 RX ADMIN — CEFTRIAXONE 2 G: 2 INJECTION, POWDER, FOR SOLUTION INTRAMUSCULAR; INTRAVENOUS at 00:39

## 2020-09-01 RX ADMIN — OMEPRAZOLE 20 MG: 20 CAPSULE, DELAYED RELEASE ORAL at 07:57

## 2020-09-01 RX ADMIN — SPIRONOLACTONE 25 MG: 25 TABLET ORAL at 07:57

## 2020-09-01 ASSESSMENT — ACTIVITIES OF DAILY LIVING (ADL)
ADLS_ACUITY_SCORE: 13
ADLS_ACUITY_SCORE: 13
ADLS_ACUITY_SCORE: 14
ADLS_ACUITY_SCORE: 13
ADLS_ACUITY_SCORE: 13

## 2020-09-01 NOTE — PLAN OF CARE
Pt refused to have his oxygen in place for a short period of time. Pt agreed to have oxy mask without continuous oxymetry in use as he was getting frustrated from the buzzing noise. Hospitalist notified new order to spot check pt and page in case his O2 is below 90%. Pt refusing to have his bed alarm activated as it was going off when he turns from one side to another or when he sits on the edge of the bed to use the urinal. Pt educated about the importance of the safety measures. Pt expressed understanding but he still refused to have alarm activated, incoming nurse notified. Will keep monitoring.

## 2020-09-01 NOTE — PLAN OF CARE
Patient alert and oriented  Peripheral IV saline locked  VSS, on 3L of O2  Denies pain  Up independently in room  Tolerating diet, denies nausea  BGs 75/118  Thoracentesis site CDI- bandaid changed  Patient discharging this afternoon  Medications being filled by pharmacy here  Home O2 require- set up in progress  Wife with transport home  Follow up visit scheduled  Continue with plan of care

## 2020-09-01 NOTE — PROGRESS NOTES
Received intake call for home oxygen at 1:37pm. Reviewed patient's chart; Patient qualifies under relaxed insurance guidelines and all documentation is in the chart including a good order. However the walk test does not match the order.   1:43pm- Called and spoke with care coordinator, Grazyna. Discussed difference between the order and the walk test. She was going to double check with the provider and call me back to let me know what liter flow they want, as it will change what I have to offer the patient. She understood.   2:33pm- Grazyna called back and said Dr Fair confirmed he wants the patient home on 2 LPM. I told her I had everything I needed, would call the patient to offer choice and deliver within the next 2 hours.   2:34pm- Called to offer choice and patient is okay with [facility] setting him up. Discussed equipment with patient and he is okay with the POC. I informed him we would be there within the next 2 hours. He said he wants to leave by 3:30pm, I told him we will do our best and be there ASAP.

## 2020-09-01 NOTE — CONSULTS
Discharge Planner   Discharge Plans in progress: yes  Barriers to discharge plan: patient was seen by care coordinator Lucina Salcido on 8/31/20 because chart review showed no PCP. According to her note,Patient reports his PCP is Dr. Richi Kay with [facility]. Patient last seen by Dr. Kay in June 2020. State he is also followed by Dr. Angel Roman with [facility]. [facility] also following patient on this admission. Encouraged f/u with his providers. A post hospitalization follow up appointment was schedule with Dr. Kay on September 8th at 10:30 pm.   Follow up plan: please reconsult if other care coordination needs arise.  Grazyna Sampson RN, BSN, PHN, CTS  Care Coordinator  [facility]  [phone]           Entered by: Grazyna Sampson 09/01/2020 10:22 AM

## 2020-09-01 NOTE — PROGRESS NOTES
Patient has been assessed for Home Oxygen needs. Oxygen readings:    *Pulse oximetry (SpO2) = 89% on room air at rest while awake.    *SpO2 improved to 93% on 4liters/minute at rest.    *SpO2 = 84% on room air during activity/with exercise.    *SpO2 improved to 95% on 4liters/minute during activity/with exercise.

## 2020-09-01 NOTE — PLAN OF CARE
VSS, A&Ox4, O2 4L oxymask. Pt tolerated oxygen all night, continued to refuse pulse ox. Spot checks revealed low-mid 90's for oxygen saturation. Abdomen distended, but pt denied tenderness upon palpation and any pain in general. Edema to BLE's, ambulation encouraged. Voiding in small amounts delmy urine. Bruising to the abdomen and large bruise on L arm, continuing to monitor.

## 2020-09-01 NOTE — PROGRESS NOTES
[facility]  [facility]  Gastroenterology Progress Note    Interval History:    Patient denies abdominal pain or trouble breathing. Improved status post para / thoracentesis yesterday. BP, renal function is stable.     Physical Exam:    BP (!) 148/59 (BP Location: Right arm)   Pulse 73   Temp 99.3  F (37.4  C) (Temporal)   Resp 18   Wt 87.9 kg (193 lb 11.2 oz)   SpO2 93%   Temp (24hrs), Av.7  F (36.5  C), Min:97.2  F (36.2  C), Max:98.5  F (36.9  C)    Patient Vitals for the past 72 hrs:   Weight   20 0759 87.9 kg (193 lb 11.2 oz)   20 0700 91.6 kg (202 lb)   20 0550 91.5 kg (201 lb 12.8 oz)   20 0033 92.8 kg (204 lb 9.6 oz)   20 1856 93 kg (205 lb)       Intake/Output Summary (Last 24 hours) at 2020 0840  Last data filed at 2020 0800  Gross per 24 hour   Intake 1190 ml   Output 2900 ml   Net -1710 ml       Constitutional: Elderly male. No acute distress.  Cardiovascular: RRR. Bilateral lower extremity edema.  Respiratory: Effort normal. O2 mask in place.  Abdomen: Ascites. Nontender.  Skin: No jaundice.    Additional Comments:  ROS, FH, SH: See initial GI consult for details.    Laboratory Data:  Recent Labs   Lab Test 20   WBC 2.0* 2.6* 3.4*   HGB 8.5* 9.8* 10.9*   MCV 89 88 86   PLT 39* 41* 57*   INR  --   --  1.29*     Recent Labs   Lab Test 20    142 142   POTASSIUM 3.6 3.6 3.7   CHLORIDE 109 112* 111*   CO2 30 26 24   BUN 10 11 11   CR 0.77 0.77 0.82   ANIONGAP 1* 4 7   ADIS 7.7* 7.7* 8.3*     Recent Labs   Lab Test 20/29/20  2343 20  1931   ALBUMIN 3.2*  --  2.6*   BILITOTAL 1.2  --  1.3   ALT 22  --  32   AST 24  --  47*   ALKPHOS 102  --  159*   PROTEIN  --  30*  --        Imaging / Endoscopy:    CT CAP with Contrast 20   - Moderately large right pleural effusion with atelectasis of right middle and lower lobes. No  suspicious chest mass evident.   - Cirrhosis and portal venous hypertension. Very prominent paraesophageal varices are present. Splenomegaly and modest diffuse ascites.   - Mild nonspecific wall thickening of right hemicolon likely relates to ascites and third spacing though colitis also possible.   - Bilateral kidney stones. No hydroureter persists.    Assessment & Plan:  Ramirez Leslie is a 75-year-old male with PMH including ALEXANDER cirrhosis, chronic diarrhea, and type 2 diabetes who was admitted 20 with abdominal pain, shortness of breath, and diarrhea.    1) Decompensated ALEXANDER cirrhosis: Complicated by esophageal varices, ascites, likely hepatic hydrothorax, pancytopenia. Diagnostic paracentesis c/w SBP (PMNs 716). Treating with ceftriaxone. Diuretics started this admission.        - Continue ceftriaxone x 5 days. Will need prophylaxis going forward - either Bactrim 160mg/800mg daily or Cipro 500 mg daily.        - Continue Lasix/Aldactone with close monitoring of renal function. High risk for HRS given SBP. If renal function remains stable can increase Lasix:Aldactone to 40mmg dose.        - Received albumin 1.5 mg/kg on  and other dose post paracentesis yesterday. Will hold today.        - Plan outpatient follow-up with [facility]. [facility] will arrange.    2) Chronic diarrhea / dark stools: Extensive workup including EGD, colonoscopy, CT, capsule, stool studies, TSH/TTG all negative. Fiber, cholestyramine, dicyclomine not helpful. Imodium previously helpful but caused patient to feel sick to his stomach; he does not recall taking this.        - Imodium PRN.    Discussed with Dr. Schrader. GI will sign off at this time. Please call if further assistance is needed.    Brielle Galloway PA-C  [facility] ([facility])

## 2020-09-01 NOTE — PROGRESS NOTES
Discharge Planner   Discharge Plans in progress: Patient to discharge with home oxygen  Barriers to discharge plan: CTS met with patient at bedside to discuss discharge plan. He is open to  home Medical for O2 Referral was made. Patient wife will be here at 14:30.   Follow up plan: will continue to follow for care coordination.    Grazyna Sampson RN, BSN, PHN, CTS  Care Coordinator  [facility]  [phone]           Entered by: Grazyna Sampson 09/01/2020 1:41 PM     Addendum 13:30:  CTS paged Dr. Fair to clarify O2 liter flow at discharge.    Addendum: 14:30  In collaboration with Lexie, bedside nurse, clarified with Dr. Fair who said he would like patient to discharge on 2 liters.  CTS called and spoke with Martha from [facility] and she will call and offer choice to patient and will have equipment delivered within 2 hours.    Grazyna Sampson RN, BSN, PHN, CTS  Care Coordinator  [facility]  [phone]

## 2020-09-02 LAB — COPATH REPORT: NORMAL

## 2020-09-02 NOTE — PROGRESS NOTES
Transition Communication Hand-off for Care Transitions to Next Level of Care Provider    Name: Ramirez Leslie  : 1945  MRN #: [id number]  Primary Care Provider: Physician No Ref-Primary  Primary Care MD Name: Dr. Richi Kay  Primary Clinic: No address on file  Primary Care Clinic Name: [facility]  Reason for Hospitalization:  Pleural effusion [J90]  LLQ abdominal pain [R10.32]  Hypoxia [R09.02]  Admit Date/Time: 2020  7:06 PM  Discharge Date: 20  Payor Source: Payor: [employer] / Plan: [employer] MEDICARE ADVANTAGE / Product Type: Medicare /     Readmission Assessment Measure (ANUPAMA) Risk Score/category:            Reason for Communication Hand-off Referral: Fragility    Discharge Plan: home with home oxygen       Concern for non-adherence with plan of care:   no  Discharge Needs Assessment:  Needs      Most Recent Value   Anticipated Changes Related to Illness  none   # of Referrals Placed by [facility]  Durable Medical Equipment (DME)          Already enrolled in Tele-monitoring program and name of program:  none  Follow-up specialty is recommended: Yes    Follow-up plan:  No future appointments.    Any outstanding tests or procedures:            Supplies     Future Labs/Procedures    Oxygen Adult/Peds     Process Instructions:    By signing this order, the Authorizing Provider is attesting that they have completed a face-to-face evaluation on the patient to determine their need for this equipment in the last 60 days. A new face-to-face evaluation is required each time  A new prescription for on eo f the specified items is ordered.   If an additional provider completed the evaluation, please indicate their name below.     **As of 2018, an order requisition and face sheet will print for all DME orders. Please give printed order and face sheet to patient if not obtaining product from [facility] DME closet.     Comments:    Oxygen Documentation:   I certify that this  patient, Ramirez Leslie has been under my care (or a nurse practitioner or physican's assistant working with me). This is the face-to-face encounter for oxygen medical necessity.      Ramirez Leslie is now in a chronic stable state and continues to require supplemental oxygen. Patient has continued oxygen desaturation due to Acute hypoxic respiratory failure .    Alternative treatment(s) tried or considered and deemed clinically infective for treatment of hydrothorax include inhalers.  If portability is ordered, is the patient mobile within the home? yes    Patients who qualify for home O2 coverage under the CMS guidelines require ABG tests or O2 sat readings obtained closest to, but no earlier than 2 days prior to the discharge, as evidence of the need for home oxygen therapy. Testing must be performed while patient is in the chronic stable state. See notes for O2 sats.            Key Recommendations:  CTS following for discharge planning. Patient demographics and chart review show no PCP.  Patient admitted for SOB, abd pain, anasarca, large pleural effusion.  Patient s/p thorocentesis 8/31. Patient has a complex medical history including h/o ALEXANDER cirrhosis, chronic diarrhea, prostatectomy and type 2 diabetes. Patient has multiple speciality providers.     Patient reports his PCP is Dr. Richi Kay with [facility]. Patient last seen by Dr. aKy in June 2020. State he is also followed by Dr. Angel Roman with [facility]. [facility] also following patient on this admission. Encouraged f/u with his providers. A post hospitalization follow up appointment was schedule with Dr. Kay on September 8th at 10:30 pm.       A handoff  sent to his PCP care coordination team at discharge. Patient can benefit from care coordination support with complex disease management and multiple speciality providers involvement.    Campbell Salcido RN    AVS/Discharge Summary is the source of truth; this  is a helpful guide for improved communication of patient story

## 2020-09-02 NOTE — PLAN OF CARE
Pain controlled with po meds. Up indep in room. Peg 2gm NA diet. Voiding without difficulty. Pt instructed per discharge paperwork. Questions answered, discharge meds given to pt. Discharged home with wife and home O2.

## 2020-09-10 NOTE — TELEPHONE ENCOUNTER
Ramirez was sent home with oxygen and today is wondering how long he will need to be on oxygen at home.  FNA advised to contact primary MD and Ramirez agreed.      COVID 19 Nurse Triage Plan/Patient Instructions    Please be aware that novel coronavirus (COVID-19) may be circulating in the community. If you develop symptoms such as fever, cough, or SOB or if you have concerns about the presence of another infection including coronavirus (COVID-19), please contact your health care provider or visit [email or web address].     Disposition/Instructions    Home care recommended. Follow home care protocol based instructions.    Thank you for taking steps to prevent the spread of this virus.  o Limit your contact with others.  o Wear a simple mask to cover your cough.  o Wash your hands well and often.    Resources    [facility]: About COVID-19: [email or web address]    CDC: What to Do If You're Sick: www.cdc.gov/coronavirus/2019-ncov/about/steps-when-sick.html    CDC: Ending Home Isolation: www.cdc.gov/coronavirus/2019-ncov/hcp/disposition-in-home-patients.html     CDC: Caring for Someone: www.cdc.gov/coronavirus/2019-ncov/if-you-are-sick/care-for-someone.html     [facility]: Interim Guidance for Hospital Discharge to Home: www.health.[facility].[place]/diseases/coronavirus/hcp/hospdischarge.pdf    [facility] clinical trials (COVID-19 research studies): clinicalaffairs.[facility]-clinical-trials     Below are the COVID-19 hotlines at the [place] [facility] ([facility]). Interpreters are available.   o For health questions: Call 444-289-9331 or 1-606.316.1606 (7 a.m. to 7 p.m.)  o For questions about schools and childcare: Call 313-948-4097 or 1-265.346.2328 (7 a.m. to 7 p.m.)                       Additional Information    Negative: Nursing judgment, per information in Reference    Negative: Information only call about a Well Adult (no illness or injury)    Negative: Nursing judgment or information in  reference    Negative: Nursing judgment or information in reference    Negative: Nursing judgment or information in reference    Negative: Nursing judgment or information in reference    Negative: Nursing judgment or information in reference    Negative: Nursing judgment or information in reference    Negative: Nursing judgment or information in reference    Negative: Nursing judgment or information in reference    Negative: Nursing judgment or information in reference    Negative: Nursing judgment or information in reference    Negative: Nursing judgment or information in reference    Negative: Nursing judgment or information in reference    Negative: Nursing judgment or information in reference    Nursing judgment or information in reference    Protocols used: NO GUIDELINE [id number]

## 2020-10-29 PROBLEM — I48.91 NEW ONSET ATRIAL FIBRILLATION (H): Status: ACTIVE | Noted: 2020-01-01

## 2020-10-29 PROBLEM — J90 RECURRENT RIGHT PLEURAL EFFUSION: Status: ACTIVE | Noted: 2020-01-01

## 2020-10-29 NOTE — ED PROVIDER NOTES
History     Chief Complaint:  Shortness of Breath    HPI   Ramirez Leslie is a 75 year old male with a history of hypertension, pleural effusion, subdural hematoma, cirrhosis, type II diabetes and prostate cancer who presents to the emergency department for evaluation of increasing shortness of breath. The patient was recently admitted on 08/29/2020 for right sided pleural effusion from his cirrhosis and acute hypoxic respiratory failure. The patient was also noted to have ascites with evidence of spontaneous bacterial peritonitis and he was treated with ceftriaxone. He also underwent a thoracentesis and had 2.1L of fluid drained. He was discharged with prescriptions for Cipro, Lasix and Aldactone.     Today the patient reports that he has had shortness of breath for the past week. This shortness of breath is particularly worse with exertion. Dyspnea improves with rest. He also notes having substernal chest pain 2 days ago, but this improved with rest and his is not in any current pain. The patient has also had some increasing peripheral edema. He otherwise denies any cough or fever. He states a concern for another pleural effusion.     Allergies:  No Known Drug Allergies    Medications:    Lipitor  Bentyl  Lasix  Lantus  Metformin  Prilosec  Aldactone  Lomotil  Hygroton    Past Medical History:    Cirrhosis  Hypertension  Nephrolithiasis  Subdural hematoma  Type II diabetes  Spontaneous bacterial peritonitis  Kidney stone  Prostate cancer  Cataract  Hepatitis A  Hematuria  Nocturia  Ascites  Pleural effusion    Past Surgical History:    Appendectomy  Orchiectomy  Lithotripsy x2  Knee arthroscopy  Left thumb nail removed  Cystoscopy  Prostatectomy     Family History:    Father: heart disease   Mother: cerebral hemorrhage    Social History:  Smoking Status: Former  Smokeless Tobacco: Not on file  Alcohol Use: Yes  Drug Use: Not on file  Marital Status:       Review of Systems   Constitutional: Negative  for fever.   Respiratory: Positive for shortness of breath. Negative for cough.    Cardiovascular: Positive for chest pain and leg swelling.   All other systems reviewed and are negative.      Physical Exam   Vitals:  Patient Vitals for the past 24 hrs:   BP Temp Temp src Pulse Resp SpO2   10/29/20 1630 -- -- -- 64 21 98 %   10/29/20 1620 (!) 162/77 -- -- 101 25 99 %   10/29/20 1610 -- -- -- -- -- 98 %   10/29/20 1600 -- -- -- -- -- 99 %   10/29/20 1432 137/83 97.6  F (36.4  C) Oral 84 16 95 %       Physical Exam    General:   Pleasant, age appropriate.  Eyes:    Conjunctiva normal  Neck:    Supple, no meningismus.     CV:     Regular rate and rhythm.      No murmurs, rubs or gallops.       No unilateral leg swelling.       2+ bilateral lower extremity edema.  PULM:    Absent breath sounds to lower 1/2-3/4 right lung     Dullness to percussion on right       No respiratory distress but tachypneic.      No rales or wheezing.     No stridor.  ABD:    Soft, non-tender, minimal distension     No pulsatile masses.       No rebound, guarding or rigidity.  MSK:     No gross deformity to all four extremities.   LYMPH:   No cervical lymphadenopathy.  NEURO:   Alert. Good muscle tone, no atrophy.  Skin:    Warm, dry and intact.    Psych:    Mood is good and affect is appropriate.        Emergency Department Course   ECG:  Completed at 1612.  Read at 1622.   Rate 61 bpm. FL interval *. QRS duration 104. QT/QTc 446/448. P-R-T axes * 18 25.  Atrial fibrillation    Imaging:  Radiographic findings were communicated with the patient who voiced understanding of the findings.    XR Chest Port 1 View  Moderate pleural effusion on the right with associated  compressive atelectasis and/or infiltrate. Left lung clear.  Reading per radiology.    Laboratory:  CBC: WBC 1.9 (L), HGB 10.1 (L), PLT 45 (L) o/w WNL.   CMP: Chloride 115 (H), Calcium 8.0 (H), Albumin 2.5 (L), Protein Total 6.5 (L) o/w AWNL (Creatinine 0.77)    Troponin (Collected  1632): <0.015     PTT: 39 (H)   INR: 1.51 (H)     ABO/Rh Type and Screen: Pending     Asymptomatic COVID-19 virus by PCR: Pending    Interventions:  1728 Lasix 40 mg IV    Emergency Department Course:  Past medical records, nursing notes, and vitals reviewed.    1544 I performed an exam of the patient as documented above.     EKG obtained in the ED, see results above.   IV was inserted and blood was drawn for laboratory testing, results above.  The patient was sent for a chest XR while in the emergency department, results above.     1557 I spoke with Dr. Luna, interventional radiology, regarding the patient.     1607 I spoke with Dr. Luna again and he reports that he will not be able to see the patient today, but would like him to be admitted.     1715 I rechecked the patient and discussed the results of his workup thus far.     1755 I spoke with Dr. Morgan, hospitalist, who agreed to admit the patient.     Findings and plan explained to the Patient who consents to admission. Discussed the patient with Dr. Morgan, who will admit the patient to an observation bed for further monitoring, evaluation, and treatment.    I personally reviewed the laboratory results with the Patient and answered all related questions prior to admission.       Impression & Plan      Covid-19  Ramirez Leslie was evaluated during a global COVID-19 pandemic, which necessitated consideration that the patient might be at risk for infection with the SARS-CoV-2 virus that causes COVID-19.   Applicable protocols for evaluation were followed during the patient's care.   COVID-19 was considered as part of the patient's evaluation. The plan for testing is:  a test was obtained during this visit.    Medical Decision Makin-year-old male with a history of right pleural effusion attributed to chronic liver disease presents with 1 week history of progressively worsening dyspnea.  Patient's examination is consistent with recurrent right  pleural effusion which is confirmed by chest x-ray.  Patiently is hypoxic at 89-90% requiring 2 L per nasal cannula and significant tachypnea with any exertion.  Unfortunately interventional radiology unable to perform thoracentesis from the ED.  Patient will be transferred to an observation bed with diuresis and plans for thoracentesis in the morning.  Patient incidentally noted to have new onset rate controlled atrial fibrillation.  I will not initiate anticoagulants due to planned thoracentesis tomorrow and defer further decision of anticoagulation to the admitting team.    Diagnosis:    ICD-10-CM    1. Recurrent right pleural effusion  J90 CBC with platelets differential     Comprehensive metabolic panel     INR     PTT     Troponin I     Troponin I     CANCELED: Troponin I   2. New onset atrial fibrillation (H)  I48.91    3. Pancytopenia (H)  D61.818        Disposition:  Admitted to the hospital under the care of Suleiman Marley, am serving as a scribe on 10/29/2020 at 3:44 PM to personally document services performed by Kike Seaman MD based on my observations and the provider's statements to me.    Suleiman Thomason  10/29/2020   [facility] EMERGENCY DEPT       Kike Seaman MD  10/29/20 0012

## 2020-10-29 NOTE — ED NOTES
[facility]  ED Nurse Handoff Report    Ramirez Leslie is a 75 year old male   ED Chief complaint: Shortness of Breath  . ED Diagnosis:   Final diagnoses:   Recurrent right pleural effusion   New onset atrial fibrillation (H)   Pancytopenia (H)     Allergies: No Known Allergies    Code Status: Full Code  Activity level - Baseline/Home:  Independent. Activity Level - Current:   Stand by Assist. Lift room needed: No. Bariatric: No   Needed: No   Isolation: No. Infection: Not Applicable.     Vital Signs:   Vitals:    10/29/20 1730 10/29/20 1813 10/29/20 1814 10/29/20 1815   BP:       Pulse: 67 88     Resp:  20 26    Temp:       TempSrc:       SpO2: 96% (!) 89% 90% 94%       Cardiac Rhythm:  ,      Pain level:    Patient confused: No. Patient Falls Risk: No.   Elimination Status: Has voided   Patient Report - Initial Complaint: Shortness of breath. Focused Assessment: Respiratory - Respiratory WDL: effort/expansion (Pt states having SOB that started a few days ago. Pt states SOB is present while at rest and really bad with activity. Pt was seen a month ago in the ED for pneumonia. ) Rhythm/Pattern, Respiratory: shortness of breath  Breath Sounds: All Fields   Head To Toe Assessment - All Lung Fields Breath Sounds: Anterior:; Posterior:; crackles (Over right side)   Tests Performed: labs, chest xray. Abnormal Results:   Labs Ordered and Resulted from Time of ED Arrival Up to the Time of Departure from the ED   CBC WITH PLATELETS DIFFERENTIAL - Abnormal; Notable for the following components:       Result Value    WBC 1.9 (*)     RBC Count 3.74 (*)     Hemoglobin 10.1 (*)     Hematocrit 33.9 (*)     MCHC 29.8 (*)     RDW 18.5 (*)     Platelet Count 45 (*)     Absolute Neutrophil 1.3 (*)     Absolute Lymphocytes 0.3 (*)     All other components within normal limits   COMPREHENSIVE METABOLIC PANEL - Abnormal; Notable for the following components:    Chloride 115 (*)     Calcium 8.0 (*)     Albumin  2.5 (*)     Protein Total 6.5 (*)     All other components within normal limits   INR - Abnormal; Notable for the following components:    INR 1.51 (*)     All other components within normal limits   PARTIAL THROMBOPLASTIN TIME - Abnormal; Notable for the following components:    PTT 39 (*)     All other components within normal limits   COVID-19 VIRUS (CORONAVIRUS) BY PCR   TROPONIN I   MAGNESIUM   TSH   PERIPHERAL IV CATHETER   PULSE OXIMETRY NURSING   ABO/RH TYPE AND SCREEN     XR Chest Port 1 View   Final Result   IMPRESSION: Moderate pleural effusion on the right with associated   compressive atelectasis and/or infiltrate. Left lung clear.      YESI HAWKINS MD        .   Treatments provided: see MAR  Family Comments: Wife Krystal johnson updates  OBS brochure/video discussed/provided to patient:  Yes  ED Medications:   Medications   furosemide (LASIX) injection 40 mg (40 mg Intravenous Given 10/29/20 1728)     Drips infusing:  No  For the majority of the shift, the patient's behavior Green. Interventions performed were N/A.    Sepsis treatment initiated: No     Patient tested for COVID 19 prior to admission: YES    ED Nurse Name/Phone Number: Ashley Leigh RN,   6:40 PM    RECEIVING UNIT ED HANDOFF REVIEW    Above ED Nurse Handoff Report was reviewed: Yes  Reviewed by: FATEMEH GUERRA RN on October 29, 2020 at 6:49 PM

## 2020-10-29 NOTE — ED TRIAGE NOTES
Patient comes in for evaluation of shortness of breath. Patient was hospitalized at the end of August for pleural effusion and ascites. Patient feels the fluid is starting to accumulate again.

## 2020-10-29 NOTE — H&P
[facility]    History and Physical  Hospitalist       Date of Admission:  10/29/2020    Assessment & Plan   Ramirez Leslie is a 75 year old male who presents with shortness of breath.     Patient has a past medical history of [place] liver cirrhosis, recurrent right-sided pleural effusion, chronic pancytopenia due to liver cirrhosis, hypertension, type 2 diabetes, obesity, prostate cancer, subdural hematoma, spontaneous bacterial peritonitis.    He was last admitted to our facility at the end of August, for acute hypoxic respiratory failure, anasarca, right-sided pleural effusion.  He was seen by Dr. Olivier from [facility].  He follows up with a liver specialist at [facility].    He has been feeling short of breath for almost a week which is progressively worsening.  Noted to be mildly hypoxic in the emergency room.  Chest x-ray shows reaccumulation of the right-sided pleural effusion.    Patient needs right-sided thoracentesis by interventional radiology.  It could not be done through the emergency room.  He is being admitted to observation service.     Problem List:    Right-sided pleural effusion.  Secondary to underlying cirrhosis.  Acute hypoxic respiratory failure.  -Overall patient looks comfortable at rest.  -Requiring 2 L of oxygen by nasal cannula.  -Interventional radiology consult for ultrasound-guided thoracentesis in the morning.  -COVID-19 test was sent from the emergency room.    Atrial fibrillation.  -Noted to have irregular heart rhythm.  -EKG done in the emergency room shows irregular rhythm.  There is probably A. fib  -Rate is controlled.  -Check magnesium level and TSH.  -Patient is symptomatic.  He states that he has never been told that he ever had A. fib.  -Repeat EKG in the morning.    Pancytopenia.  Due to liver cirrhosis.  -Has a white cell count of 1.9.  Hemoglobin of 10.1.  Platelets 45.  Chronic issue due to liver cirrhosis.    Liver cirrhosis  -Follow-up with  [facility] in the clinic.  -Resume Lasix and spironolactone.    Diabetes  -Continue Metformin.  Hold the Lantus for now.  Sliding scale insulin.    Dyslipidemia  -Continue atorvastatin.      DVT Prophylaxis: low risk, ambulate  Code Status: Full Code    Patrice Morgan MD    Primary Care Physician   Physician No Ref-Primary    Chief Complaint   Shortness of breath.      History of Present Illness   Ramirez Leslie is a 75 year old male presented with shortness of breath      Past Medical History    I have reviewed this patient's medical history and updated it with pertinent information if needed.   Past Medical History:   Diagnosis Date     Cirrhosis (H)      HTN (hypertension)      Nephrolithiasis      Subdural hematoma (H)      Type II diabetes mellitus (H)     insulin dependent       Past Surgical History   I have reviewed this patient's surgical history and updated it with pertinent information if needed.  Past Surgical History:   Procedure Laterality Date     APPENDECTOMY       HC LAP,ORCHIECTOMY Left      LITHOTRIPSY         Prior to Admission Medications   Prior to Admission Medications   Prescriptions Last Dose Informant Patient Reported? Taking?   atorvastatin (LIPITOR) 20 MG tablet   Yes No   Sig: Take 20 mg by mouth every morning    dicyclomine (BENTYL) 20 MG tablet   Yes No   Sig: Take 20 mg by mouth 2 times daily   furosemide (LASIX) 40 MG tablet   No No   Sig: Take 1 tablet (40 mg) by mouth daily   insulin glargine (LANTUS PEN) 100 UNIT/ML pen   Yes No   Sig: Inject 5 Units Subcutaneous every morning   metFORMIN (GLUCOPHAGE) 500 MG tablet   Yes No   Sig: Take 500 mg by mouth 2 times daily (with meals)   omeprazole (PRILOSEC) 20 MG DR capsule   Yes No   Sig: Take 20 mg by mouth 2 times daily   spironolactone (ALDACTONE) 25 MG tablet   No No   Sig: Take 1 tablet (25 mg) by mouth daily      Facility-Administered Medications: None     Allergies   No Known Allergies    Social History   I have reviewed this  patient's social history and updated it with pertinent information if needed. Ramirez Leslie  reports that he has quit smoking. He does not have any smokeless tobacco history on file.    Family History   I have reviewed this patient's family history and updated it with pertinent information if needed.   Family History   Problem Relation Age of Onset     Heart Disease Father        Review of Systems   The 10 point Review of Systems is negative other than noted in the HPI or here.     Physical Exam   Temp: 97.6  F (36.4  C) Temp src: Oral BP: (!) 162/77 Pulse: 88   Resp: 26 SpO2: 94 % O2 Device: Nasal cannula Oxygen Delivery: 3 LPM  Vital Signs with Ranges  Temp:  [97.6  F (36.4  C)] 97.6  F (36.4  C)  Pulse:  [] 88  Resp:  [16-26] 26  BP: (137-162)/(77-83) 162/77  SpO2:  [89 %-99 %] 94 %  0 lbs 0 oz    Constitutional: Awake, alert, cooperative, no apparent distress.  Eyes: Conjunctiva and pupils examined and normal.  HEENT: Moist mucous membranes, normal dentition.  Respiratory: Decreased air entry at the right base.  No wheezing.    Cardiovascular: Irregular rate and rhythm, normal S1 and S2, and no murmur noted.  GI: Soft, non-distended, non-tender, normal bowel sounds.  Skin: No rashes, no cyanosis  Musculoskeletal: No joint swelling, erythema or tenderness.  Neurologic: Cranial nerves 2-12 intact, normal strength and sensation.  Psychiatric: Alert, oriented to person, place and time, no obvious anxiety or depression.    Data     Recent Labs   Lab 10/29/20  1632   WBC 1.9*   HGB 10.1*   MCV 91   PLT 45*   INR 1.51*      POTASSIUM 3.8   CHLORIDE 115*   CO2 21   BUN 11   CR 0.77   ANIONGAP 8   ADIS 8.0*   GLC 76   ALBUMIN 2.5*   PROTTOTAL 6.5*   BILITOTAL 1.3   ALKPHOS 144   ALT 29   AST 36   TROPI <0.015       Recent Results (from the past 24 hour(s))   XR Chest Port 1 View    Narrative    CHEST ONE VIEW  10/29/2020 4:28 PM     HISTORY: Dyspnea; likely recurrent right pleural effusion.    COMPARISON:  8/29/2020      Impression    IMPRESSION: Moderate pleural effusion on the right with associated  compressive atelectasis and/or infiltrate. Left lung clear.    YESI HAWKINS MD

## 2020-10-30 NOTE — PLAN OF CARE
PRIMARY DIAGNOSIS: RIGHT PLEURAL EFFUSION/NEW ONSET A-FIB  OUTPATIENT/OBSERVATION GOALS TO BE MET BEFORE DISCHARGE:  Dyspnea improved and O2 sats >88% on RA or back to baseline O2 levels: No   SpO2: 95 %, O2 Device: Nasal cannula    Tolerating oral abx or appropriate plans made outpatient infusion:  NOT IN ABX    Vitals signs normal or return to baseline: Yes    Short term supplemental O2 needed with activity at home: Yes    Tolerate oral intake to maintain hydration: Yes    Return to near baseline physical activity: Yes    Discharge Planner Nurse   Safe discharge environment identified: Yes  Barriers to discharge: Yes       Entered by: FATEMEH GUERRA 10/30/2020   Vital signs:  Temp: 97.6  F (36.4  C) Temp src: Oral BP: 126/53 Pulse: 71   Resp: 20 SpO2: 95 % O2 Device: Nasal cannula Oxygen Delivery: 3 LPM Alert and oriented x4. On 3L continuous O2 to keep sats >90%. Had thoracoschisis this morning with output of 2100mL per note. On regular diet. Refused eating breakfast. Apple sauce and gram cracker given with morning medications. Saline locked. Stand by assist. Will continue to monitor.      Please review provider order for any additional goals.   Nurse to notify provider when observation goals have been met and patient is ready for discharge.

## 2020-10-30 NOTE — PROGRESS NOTES
Patient's BG at 0313 was 58. Patient denying any hypoglycemic symptoms. Patient given two 4 oz apple juice. Recheck at 0330 was 70. Two more 4 oz apple juice given and recheck at 0350 was 93.

## 2020-10-30 NOTE — PROGRESS NOTES
Right thoracentesis completed. Pt tolerated well, vital signs stable. No SOB reported. Obtained consent with Dr. Plascencia. Time out completed and documented in EMR prior to start of procedure. 2100 mls fluid collected delmy in color.  Site covered with bandage.  Reviewed education and discharge instructions with pt. Pt transferred back to floor. Report called to RN.

## 2020-10-30 NOTE — PROGRESS NOTES
Pt ambulated in a knott SBA with continuous pulse-ox. Pt's O2 saturation was between 91%-95%. Denies SOB. Pt is also in RA with sats >95% after right thoracithesis.

## 2020-10-30 NOTE — CONSULTS
"Care Management Assessment and Discharge Consult    General Information  Assessment completed with:: Patient,    Type of CM/SW Visit: Initial Assessment     Readmission Within the Last 30 Days: no previous admission in last 30 days     Reason for Consult: discharge planning  Advance Care Planning:            Communication Assessment  Patient's communication style: spoken language (English or Bilingual)  Hearing Difficulty or Deaf: no Wear Glasses or Blind: yes    Cognitive  Cognitive/Neuro/Behavioral: WDL  Level of Consciousness: alert  Arousal Level: opens eyes spontaneously  Orientation: oriented x 4  Mood/Behavior: calm, cooperative  Best Language: 0 - No aphasia  Speech: clear, spontaneous, logical    Living Environment:   People in home: spouse       Current Resources:   Equipment currently used at home: none      Lifestyle & Psychosocial Needs:        Socioeconomic History     Marital status:      Spouse name: Not on file     Number of children: Not on file     Years of education: Not on file     Highest education level: Not on file     Tobacco Use     Smoking status: Former Smoker       Functional Status:  Prior to admission patient needed assistance:          Mental Health Status:  WDL                            Values/Beliefs:  Spiritual, Cultural Beliefs, Caodaism Practices, Values that affect care: no                 Discharge Planning:  Expected Discharge Date: 10/31/20     Concerns to be Addressed: Follow-up appointment     Anticipated Discharge Disposition: Home    Additional Information:  Met w/ pt and wife at bedside to discuss PCP follow-up at discharge. They were agreeable to  assistance with arranging this. Appointment scheduled, AVS updated.     \"Your hospital follow up appointment has been scheduled for you with Dr. Webber at [facility] for Thursday 11/5 at 10:15am. Please bring your hospital discharge instructions, a photo ID, and insurance information with you to your " "appointment. Please call the clinic at [phone] if you need to reschedule.\"    Will continue to follow patient for any additional discharge needs.     Theresa Gutierrez RN BSN   Inpatient Care Coordination  [facility]   Phone [phone]    "

## 2020-10-30 NOTE — PLAN OF CARE
PRIMARY DIAGNOSIS: Right pleural effusion  OUTPATIENT/OBSERVATION GOALS TO BE MET BEFORE DISCHARGE:  1. Dyspnea improved and O2 sats >88% on RA or back to baseline O2 levels: Yes   SpO2: 91 %, O2 Device: Nasal cannula    2. Tolerating oral abx or appropriate plans made outpatient infusion:  Not ordered    3. Vitals signs normal or return to baseline: Yes    4. Short term supplemental O2 needed with activity at home: No    5. Tolerate oral intake to maintain hydration: Yes    6. Return to near baseline physical activity: Yes    Discharge Planner Nurse   Safe discharge environment identified: Yes  Barriers to discharge: Yes       Entered by: Louise Cortez 10/30/2020 1:43 AM     Vitals are Temp: 97.8  F (36.6  C) Temp src: Oral BP: 122/48 Pulse: 88   Resp: 18 SpO2: 91 %.  Patient is Alert and Oriented x4. They are SBA with Gait Belt.  Pt is a Regular diet.  Pt reports mild sob at rest that worsens with activity. Pt able to catch breath after rest. They are denying pain. Pt is on 2L O2 per NC. Moderate edema in bilateral lower legs, ankles, and feet. Patient is Saline locked. Will continue to monitor and provide cares.   Please review provider order for any additional goals. Nurse to notify provider when observation goals have been met and patient is ready for discharge.

## 2020-10-30 NOTE — DISCHARGE SUMMARY
"Discharge Summary    Ramirez Leslie MRN# [id number]   YOB: 1945 Age: 75 year old     Date of Admission:  10/29/2020  Date of Discharge:  10/30/2020 12:15 PM  Admitting Physician:  Patrice Morgan MD  Discharge Physician:  Armond Knowles MD  Discharging Service:  Hospitalist     Home clinic: [facility]  Primary Provider: Richi Kay          Discharge Diagnosis:   1. Recurrent right pleural effusion  2. Shortness of breath  3. Hypoxia  4. S/p thoracentesis  5. Non-alcoholic steatohepatitis with cirrhosis  6. Type 2 diabetes  7. Prostate cancer  8. History of spontaneous bacterial peritonitis  9. History of subdural hematoma             Discharge Disposition:   Discharged to home           Allergies:   No Known Allergies             Condition on Discharge:   Discharge condition: Stable   Discharge vitals: Blood pressure 131/54, pulse 67, temperature 97.4  F (36.3  C), temperature source Oral, resp. rate 20, height 1.702 m (5' 7\"), weight 87.4 kg (192 lb 9.6 oz), SpO2 90 %.   Code status on discharge: Full Code   Physical exam on day of discharge:   GENERAL:  Comfortable. Cooperative.  PSYCH: pleasant, oriented, No acute distress.  EYES: PERRLA, Normal conjunctiva.  HEART:  Regular rate and rhythm. No JVD. Pulses normal. No edema.  LUNGS:  Clear to auscultation, normal Respiratory effort.  ABDOMEN:  Soft, no hepatosplenomegaly, normal bowel sounds.  EXTREMETIES: No clubbing, cyanosis or ischemia  SKIN:  Dry to touch, No rash.         History of Present Illness and Hospital Course:     See detailed admission note for full details.  Ramirez Leslie is a 75-year-old male with history of nonalcoholic steatohepatitis, cirrhosis of the liver, recurrent right-sided pleural effusion, previous spontaneous bacterial peritonitis, chronic pancytopenia, hypertension, type 2 diabetes, prostate cancer, and subdural hematoma.  He presented to [facility] on 10/29/2020 for " evaluation of shortness of breath.  Emergency department evaluation showed hypoxia with need for supplemental oxygen at 3 L/min.  He was comfortable at rest but dyspneic with exertion.  Laboratory evaluation showed unremarkable comprehensive metabolic panel.  CBC showed pancytopenia with white blood cell count 1.9, hemoglobin 10.1, and platelets 45.  INR was elevated at 1.51.  PTT was 39.  COVID-19 test was negative.  EKG showed no signs of coronary ischemia.  Chest x-ray showed moderate right pleural effusion with compressive atelectasis.  Ramirez was admitted to observation overnight.  He was managed with supplemental oxygen.  The morning after admission right-sided thoracentesis was performed.  Fluid was not sent for analysis as he had thoracentesis on 8/31/2020 that showed transudate of fluid with negative cytology.  Ramirez felt much better after thoracentesis.  He no longer needed supplemental oxygen.  He wanted to discharge home.  Prior to discharge we agreed to try Aldactone and Lasix to hopefully prevent or at least reduce the accumulation of pleural effusion, ascites, and peripheral edema.  I ordered Aldactone 25 mg daily and furosemide 10 mg daily.  He normally follows with Dr. Aguiar at [facility].  Evidently, Dr. Aguiar is no longer seeing patients in person.  He is seeing patients virtually, due to COVID-19.  I think Ramirez would benefit from seeing a provider that could see him in person to better evaluate his volume status.  I recommended that he try to follow-up with Dr. Jeniffer Webber or one of their partners.  I am requesting that he follow-up in 1 to 2 weeks with a basic metabolic panel to be sure he is tolerating these new diuretics.           Procedures / Imaging:   Right thoracentesis  CXR  ECG           Consultations:   No consultations were requested during this admission             Pending Results:   None           Discharge Instructions and Follow-Up:   Discharge diet:  Low salt   Discharge activity: Activity as tolerated   Discharge follow-up: Follow up with Dr. Jeniffer Webber or partner in 1-2 weeks with [facility]   Outpatient therapy: None    Other instructions: None             Discharge Medications:   Discharge Medication List as of 10/30/2020  4:02 PM      CONTINUE these medications which have NOT CHANGED    Details   atorvastatin (LIPITOR) 20 MG tablet Take 20 mg by mouth every morning , Historical      carvedilol (COREG) 3.125 MG tablet Take 3.125 mg by mouth 2 times daily (with meals), Historical      dicyclomine (BENTYL) 20 MG tablet Take 20 mg by mouth 2 times daily, Historical      insulin glargine (LANTUS PEN) 100 UNIT/ML pen Inject 16 Units Subcutaneous every morning , HistoricalIf Lantus is not covered by insurance, may substitute Basaglar at same dose and frequency.        metFORMIN (GLUCOPHAGE) 500 MG tablet Take 500 mg by mouth 2 times daily (with meals), Historical      omeprazole (PRILOSEC) 20 MG DR capsule Take 20 mg by mouth 2 times daily, Historical                Total time spent in face to face contact with the patient and coordinating discharge was:  25 Minutes

## 2020-10-30 NOTE — PLAN OF CARE
PRIMARY DIAGNOSIS: Right pleural effusion  OUTPATIENT/OBSERVATION GOALS TO BE MET BEFORE DISCHARGE:  1. Dyspnea improved and O2 sats >88% on RA or back to baseline O2 levels: Yes   SpO2: 91 %, O2 Device: Nasal cannula    2. Tolerating oral abx or appropriate plans made outpatient infusion:  Not ordered    3. Vitals signs normal or return to baseline: Yes    4. Short term supplemental O2 needed with activity at home: No    5. Tolerate oral intake to maintain hydration: Yes    6. Return to near baseline physical activity: Yes    Discharge Planner Nurse   Safe discharge environment identified: Yes  Barriers to discharge: Yes       Entered by: Louise Cortez 10/30/2020 1:40 AM     Vitals are Temp: 97.8  F (36.6  C) Temp src: Oral BP: 122/48 Pulse: 88   Resp: 18 SpO2: 91 %.  Patient is Alert and Oriented x4. They are SBA with Gait Belt.  Pt is a Regular diet.  Pt reports mild sob at rest that worsens with activity. Pt able to catch breath after rest. Pt is on 1.5L O2 per NC. They are denying pain.  Patient is Saline locked. Will continue to monitor and provide cares.   Please review provider order for any additional goals. Nurse to notify provider when observation goals have been met and patient is ready for discharge.

## 2020-10-30 NOTE — PROGRESS NOTES
ROOM # 222    Living Situation (if not independent, order SW consult): Home w/ wife, daughter, and granddaughter  Facility name:  : Wife    Activity level at baseline: Ind  Activity level on admit: Sba      Patient registered to observation; given Patient Bill of Rights; given the opportunity to ask questions about observation status and their plan of care.  Patient has been oriented to the observation room, bathroom and call light is in place.    Discussed discharge goals and expectations with patient/family.

## 2020-10-30 NOTE — PLAN OF CARE
PRIMARY DIAGNOSIS: Right pleural effusion  OUTPATIENT/OBSERVATION GOALS TO BE MET BEFORE DISCHARGE:  1. Dyspnea improved and O2 sats >88% on RA or back to baseline O2 levels: Yes   SpO2: 92 %, O2 Device: 3L O2 Nasal cannula    2. Tolerating oral abx or appropriate plans made outpatient infusion:  Not ordered    3. Vitals signs normal or return to baseline: Yes    4. Short term supplemental O2 needed with activity at home: No    5. Tolerate oral intake to maintain hydration: Yes    6. Return to near baseline physical activity: Yes    Discharge Planner Nurse   Safe discharge environment identified: Yes  Barriers to discharge: Yes       Entered by: Louise Cortez 10/30/2020 4:22 AM     Vitals are Temp: 98  F (36.7  C) Temp src: Oral BP: (!) 115/35 Pulse: 78   Resp: 20 SpO2: 92 %.  Patient is Alert and Oriented x4. They are SBA with Gait Belt.  Pt is a Regular diet.  Pt denies sob at rest that worsens with activity. Pt able to catch breath after rest. They are denying pain. Increased to 3L O2 per NC d/t O2 desaturation. Moderate edema in bilateral lower legs, ankles, and feet. Patient is Saline locked. Will continue to monitor and provide cares.     Additional 4oz apple juice given at 0400 (see previous progress note),  at 0415.  Pt reported 2 episodes of diarrhea per pt report. Will continue to monitor.   Please review provider order for any additional goals. Nurse to notify provider when observation goals have been met and patient is ready for discharge.

## 2020-10-30 NOTE — CONSULTS
CLINICAL NUTRITION SERVICES  -  ASSESSMENT NOTE      Malnutrition:  % Weight Loss: Weight loss does not meet criteria for malnutrition (Difficult to assess due to lack of information, possible that fluid is masking wt loss)  % Intake:  <75% for >/= 1 month (non-severe malnutrition)  Subcutaneous Fat Loss:  None observed  Muscle Loss:  Temporal region mild depletion, Clavicle bone region mild depletion and Posterior calf region mild depletion  Fluid Retention:  Mild LE edema    Malnutrition Diagnosis: Non-Severe malnutrition  In Context of:  Acute illness or injury on Chronic disease        REASON FOR ASSESSMENT  Ramirez Leslie is a 75 year old male seen by Registered Dietitian for Admission Nutrition Risk Screen for unintentional loss of 10# or more in the past two months (about a month ago, loss of appetite and 30lb weight loss)    Reason for admission:  Shortness of breath  Currently pt is on 3L O2 NC, dyspnea is noted to be improving per nurse reports    PMH:  Liver cirrhosis, recurrent right sided pleural effusion, chronic pancytopenia due to liver cirrhosis, hypertension, T2DM, obesity, prostate cancer, subdural hematoma, spontaneous bacterial peritonitis  Admitted to our facility in August with acute hypoxic respiratory failure, anasarca, right-sided pleural effusion    NUTRITION HISTORY  - Information obtained from chart, notes, pt  Lives at home with wife, daughter, and granddaughter  Pt reports not eating much due to lack of interest in food, although he sometimes takes protein supplements and bars. Offered protein supplements but as he will be discharging soon so he declined  Pt reported usual weight at 185lbs but a decrease to about 179lbs in past month    CURRENT NUTRITION ORDERS  Diet Order:     Regular pre MD    Current Intake/Tolerance:  No food record data.  No edema noted in chart, LE edema reported in note  Pt reported that eating has been going okay but that he is not interested in food which  "limits his intake    NUTRITION FOCUSED PHYSICAL ASSESSMENT FOR DIAGNOSING MALNUTRITION)  Yes, visual    Observed:    Muscle wasting (refer to documentation in Malnutrition section)    Obtained from Chart/Interdisciplinary Team:  Moderate edema noted in lower legs, ankles, and feet per nurse    ANTHROPOMETRICS  Height: 5' 7\"  Weight: 192 lbs 9.6 oz  Body mass index is 30.17 kg/m .  Weight Status:  Obesity Grade I BMI 30-34.9  IBW: 148lbs  % IBW: 130%  Weight History: Difficult to assess with limited data. Risk Screen for unintentional loss of 10# or more in the past two months (about a month ago, loss of appetite and 30lb weight loss)    Wt Readings from Last 10 Encounters:   10/29/20 87.4 kg (192 lb 9.6 oz)   09/01/20 87.9 kg (193 lb 11.2 oz)       LABS  Labs reviewed    MEDICATIONS  Medications reviewed  Lasix given 10/29, discontinued today  Metformin    ASSESSED NUTRITION NEEDS PER APPROVED PRACTICE GUIDELINES:    Dosing Weight 87kg  Estimated Energy Needs: 5113-2710 kcals (20-25 Kcal/Kg)  Justification: maintenance  Estimated Protein Needs: 104-130 grams protein (1-1.2 g pro/Kg)  Justification: Repletion  Estimated Fluid Needs: Per MD    MALNUTRITION:  % Weight Loss: Weight loss does not meet criteria for malnutrition (Difficult to assess due to lack of information, possible that fluid is masking wt loss)  % Intake:  <75% for >/= 1 month (non-severe malnutrition)  Subcutaneous Fat Loss:  None observed  Muscle Loss:  Temporal region mild depletion, Clavicle bone region mild depletion and Posterior calf region mild depletion  Fluid Retention:  Mild LE edema    Malnutrition Diagnosis: Non-Severe malnutrition  In Context of:  Acute illness or injury on Chronic disease    NUTRITION DIAGNOSIS:  Predicted inadequate nutrient (calorie and protein) intake related to disinterest in food and malnutrition coding     NUTRITION INTERVENTIONS  Recommendations / Nutrition Prescription  Continue diet per " MD    Implementation  Discussed current and previous dietary and protein intake with pt    Nutrition Goals  Consume 50-75% of meals and supplements BID-TID    MONITORING AND EVALUATION:  Progress towards goals will be monitored and evaluated per protocol and Practice Guidelines      Leyla James  Dietetic Intern

## 2020-10-30 NOTE — PHARMACY-ADMISSION MEDICATION HISTORY
Admission medication history interview status for this patient is complete. See [facility] admission navigator for allergy information, prior to admission medications and immunization status.     Medication history interview done via telephone during Covid-19 pandemic, indicate source(s): Patient and wife  Medication history resources (including written lists, pill bottles, clinic record): wife read from the pill bottles    Changes made to PTA medication list:  Added: carvedilol  Deleted: furosemide and spironolactone  Changed: insulin Lantus 5 units -> 16 units daily    Actions taken by pharmacist (provider contacted, etc):None     Additional medication history information: per wife, pt does not take Lantus consistently    Medication reconciliation/reorder completed by provider prior to medication history?  N   (Y/N)     For patients on insulin therapy:   Do you use sliding scale insulin based on blood sugars?   What is your pre-meal insulin coverage?  -none  Do you typically eat three meals a day? -  How many times do you check your blood glucose per day? -  How many episodes of hypoglycemia do you typically have per month? -  Do you have a Continuous Glucose Monitor (CGM)?  -    Prior to Admission medications    Medication Sig Last Dose Taking? Auth Provider   atorvastatin (LIPITOR) 20 MG tablet Take 20 mg by mouth every morning  10/29/2020 Yes Unknown, Entered By History   carvedilol (COREG) 3.125 MG tablet Take 3.125 mg by mouth 2 times daily (with meals) 10/29/2020 at am Yes Unknown, Entered By History   dicyclomine (BENTYL) 20 MG tablet Take 20 mg by mouth 2 times daily 10/29/2020 Yes Unknown, Entered By History   insulin glargine (LANTUS PEN) 100 UNIT/ML pen Inject 16 Units Subcutaneous every morning  10/29/2020 Yes Shankar Fair MD   metFORMIN (GLUCOPHAGE) 500 MG tablet Take 500 mg by mouth 2 times daily (with meals) 10/29/2020 at am Yes Unknown, Entered By History   omeprazole (PRILOSEC) 20 MG DR capsule  Take 20 mg by mouth 2 times daily 10/29/2020 at am Yes Unknown, Entered By History

## 2020-10-30 NOTE — DISCHARGE INSTRUCTIONS
Your hospital follow up appointment has been scheduled for you with Dr. Webber at [facility] for Thursday 11/5 at 10:15am. Please bring your hospital discharge instructions, a photo ID, and insurance information with you to your appointment. Please call the clinic at [phone] if you need to reschedule.

## 2020-10-30 NOTE — PLAN OF CARE
Patient's After Visit Summary was reviewed with patient and/or spouse.   Patient verbalized understanding of After Visit Summary, recommended follow up and was given an opportunity to ask questions.   Discharge medications sent home with patient/family: No   Discharged with spouse  VSS. Wheelchair ride provided.

## 2021-01-01 ENCOUNTER — HOSPITAL ENCOUNTER (OUTPATIENT)
Dept: ULTRASOUND IMAGING | Facility: CLINIC | Age: 76
Discharge: HOME | End: 2021-06-16
Attending: FAMILY MEDICINE | Admitting: FAMILY MEDICINE
Payer: COMMERCIAL

## 2021-01-01 ENCOUNTER — APPOINTMENT (OUTPATIENT)
Dept: ULTRASOUND IMAGING | Facility: CLINIC | Age: 76
End: 2021-01-01
Attending: EMERGENCY MEDICINE
Payer: COMMERCIAL

## 2021-01-01 ENCOUNTER — TELEPHONE (OUTPATIENT)
Dept: ONCOLOGY | Facility: CLINIC | Age: 76
End: 2021-01-01

## 2021-01-01 ENCOUNTER — PATIENT OUTREACH (OUTPATIENT)
Dept: ONCOLOGY | Facility: CLINIC | Age: 76
End: 2021-01-01

## 2021-01-01 ENCOUNTER — HOSPITAL ENCOUNTER (OUTPATIENT)
Dept: ULTRASOUND IMAGING | Facility: CLINIC | Age: 76
End: 2021-04-09
Attending: INTERNAL MEDICINE
Payer: COMMERCIAL

## 2021-01-01 ENCOUNTER — TELEPHONE (OUTPATIENT)
Dept: CARE COORDINATION | Facility: HOSPITAL | Age: 76
End: 2021-01-01

## 2021-01-01 ENCOUNTER — APPOINTMENT (OUTPATIENT)
Dept: GENERAL RADIOLOGY | Facility: CLINIC | Age: 76
End: 2021-01-01
Attending: EMERGENCY MEDICINE
Payer: COMMERCIAL

## 2021-01-01 ENCOUNTER — APPOINTMENT (OUTPATIENT)
Dept: GENERAL RADIOLOGY | Facility: CLINIC | Age: 76
End: 2021-01-01
Attending: NURSE PRACTITIONER
Payer: COMMERCIAL

## 2021-01-01 ENCOUNTER — NURSE TRIAGE (OUTPATIENT)
Dept: NURSING | Facility: CLINIC | Age: 76
End: 2021-01-01

## 2021-01-01 ENCOUNTER — PRE VISIT (OUTPATIENT)
Dept: GASTROENTEROLOGY | Facility: CLINIC | Age: 76
End: 2021-01-01

## 2021-01-01 ENCOUNTER — HOSPITAL ENCOUNTER (OUTPATIENT)
Facility: HOSPITAL | Age: 76
Setting detail: OBSERVATION
Discharge: HOME HEALTH | DRG: 442 | End: 2021-07-19
Attending: HOSPITALIST | Admitting: HOSPITALIST
Payer: COMMERCIAL

## 2021-01-01 ENCOUNTER — TELEPHONE (OUTPATIENT)
Dept: SURGERY | Facility: CLINIC | Age: 76
End: 2021-01-01

## 2021-01-01 ENCOUNTER — HOSPITAL ENCOUNTER (EMERGENCY)
Facility: CLINIC | Age: 76
Discharge: HOME | End: 2021-02-09
Attending: EMERGENCY MEDICINE | Admitting: EMERGENCY MEDICINE
Payer: COMMERCIAL

## 2021-01-01 ENCOUNTER — HOSPITAL ENCOUNTER (OUTPATIENT)
Dept: ULTRASOUND IMAGING | Facility: CLINIC | Age: 76
End: 2021-03-16
Attending: INTERNAL MEDICINE
Payer: COMMERCIAL

## 2021-01-01 ENCOUNTER — HOSPITAL ENCOUNTER (EMERGENCY)
Facility: CLINIC | Age: 76
Discharge: HOME | End: 2021-02-25
Attending: EMERGENCY MEDICINE | Admitting: EMERGENCY MEDICINE
Payer: COMMERCIAL

## 2021-01-01 ENCOUNTER — APPOINTMENT (OUTPATIENT)
Dept: PHYSICAL THERAPY | Facility: CLINIC | Age: 76
DRG: 441 | End: 2021-01-01
Attending: PHYSICIAN ASSISTANT
Payer: COMMERCIAL

## 2021-01-01 ENCOUNTER — HOSPITAL ENCOUNTER (OUTPATIENT)
Dept: CT IMAGING | Facility: CLINIC | Age: 76
End: 2021-03-16
Attending: INTERNAL MEDICINE
Payer: COMMERCIAL

## 2021-01-01 ENCOUNTER — APPOINTMENT (OUTPATIENT)
Dept: CT IMAGING | Facility: CLINIC | Age: 76
DRG: 441 | End: 2021-01-01
Attending: EMERGENCY MEDICINE
Payer: COMMERCIAL

## 2021-01-01 ENCOUNTER — HOSPITAL ENCOUNTER (EMERGENCY)
Facility: CLINIC | Age: 76
Discharge: ANOTHER HOSPITAL | End: 2021-07-18
Attending: NURSE PRACTITIONER | Admitting: NURSE PRACTITIONER
Payer: COMMERCIAL

## 2021-01-01 ENCOUNTER — APPOINTMENT (OUTPATIENT)
Dept: GENERAL RADIOLOGY | Facility: CLINIC | Age: 76
End: 2021-01-01
Attending: RADIOLOGY
Payer: COMMERCIAL

## 2021-01-01 ENCOUNTER — HOSPITAL ENCOUNTER (INPATIENT)
Facility: CLINIC | Age: 76
LOS: 6 days | Discharge: HOME | DRG: 442 | End: 2021-07-06
Attending: EMERGENCY MEDICINE | Admitting: INTERNAL MEDICINE
Payer: COMMERCIAL

## 2021-01-01 ENCOUNTER — APPOINTMENT (OUTPATIENT)
Dept: OCCUPATIONAL THERAPY | Facility: CLINIC | Age: 76
DRG: 441 | End: 2021-01-01
Attending: PHYSICIAN ASSISTANT
Payer: COMMERCIAL

## 2021-01-01 ENCOUNTER — PRE VISIT (OUTPATIENT)
Dept: ONCOLOGY | Facility: CLINIC | Age: 76
End: 2021-01-01

## 2021-01-01 ENCOUNTER — APPOINTMENT (OUTPATIENT)
Dept: CT IMAGING | Facility: CLINIC | Age: 76
DRG: 442 | End: 2021-01-01
Attending: EMERGENCY MEDICINE
Payer: COMMERCIAL

## 2021-01-01 ENCOUNTER — HOSPITAL ENCOUNTER (EMERGENCY)
Facility: CLINIC | Age: 76
Discharge: HOME | End: 2021-04-09
Attending: INTERNAL MEDICINE
Payer: COMMERCIAL

## 2021-01-01 ENCOUNTER — HOSPITAL ENCOUNTER (EMERGENCY)
Facility: CLINIC | Age: 76
Discharge: HOME | End: 2021-01-05
Attending: EMERGENCY MEDICINE | Admitting: EMERGENCY MEDICINE
Payer: COMMERCIAL

## 2021-01-01 ENCOUNTER — APPOINTMENT (OUTPATIENT)
Dept: GENERAL RADIOLOGY | Facility: CLINIC | Age: 76
DRG: 441 | End: 2021-01-01
Attending: EMERGENCY MEDICINE
Payer: COMMERCIAL

## 2021-01-01 ENCOUNTER — ONCOLOGY VISIT (OUTPATIENT)
Dept: ONCOLOGY | Facility: CLINIC | Age: 76
End: 2021-01-01
Attending: INTERNAL MEDICINE
Payer: COMMERCIAL

## 2021-01-01 ENCOUNTER — APPOINTMENT (OUTPATIENT)
Dept: CT IMAGING | Facility: CLINIC | Age: 76
DRG: 315 | End: 2021-01-01
Attending: EMERGENCY MEDICINE
Payer: COMMERCIAL

## 2021-01-01 ENCOUNTER — HOSPITAL ENCOUNTER (INPATIENT)
Facility: CLINIC | Age: 76
LOS: 3 days | Discharge: HOME HEALTH | DRG: 441 | End: 2021-04-24
Attending: EMERGENCY MEDICINE | Admitting: INTERNAL MEDICINE
Payer: COMMERCIAL

## 2021-01-01 ENCOUNTER — HOSPITAL ENCOUNTER (INPATIENT)
Facility: CLINIC | Age: 76
LOS: 1 days | Discharge: HOME | DRG: 315 | End: 2021-05-18
Attending: EMERGENCY MEDICINE | Admitting: INTERNAL MEDICINE
Payer: COMMERCIAL

## 2021-01-01 ENCOUNTER — DOCUMENTATION ONLY (OUTPATIENT)
Dept: OTHER | Facility: CLINIC | Age: 76
End: 2021-01-01

## 2021-01-01 ENCOUNTER — APPOINTMENT (OUTPATIENT)
Dept: ULTRASOUND IMAGING | Facility: CLINIC | Age: 76
DRG: 441 | End: 2021-01-01
Attending: STUDENT IN AN ORGANIZED HEALTH CARE EDUCATION/TRAINING PROGRAM
Payer: COMMERCIAL

## 2021-01-01 ENCOUNTER — PATIENT OUTREACH (OUTPATIENT)
Dept: CARE COORDINATION | Facility: CLINIC | Age: 76
End: 2021-01-01

## 2021-01-01 ENCOUNTER — TELEPHONE (OUTPATIENT)
Dept: GASTROENTEROLOGY | Facility: CLINIC | Age: 76
End: 2021-01-01

## 2021-01-01 ENCOUNTER — APPOINTMENT (OUTPATIENT)
Dept: GENERAL RADIOLOGY | Facility: CLINIC | Age: 76
DRG: 315 | End: 2021-01-01
Attending: EMERGENCY MEDICINE
Payer: COMMERCIAL

## 2021-01-01 ENCOUNTER — APPOINTMENT (OUTPATIENT)
Dept: OCCUPATIONAL THERAPY | Facility: HOSPITAL | Age: 76
DRG: 442 | End: 2021-01-01
Attending: HOSPITALIST
Payer: COMMERCIAL

## 2021-01-01 VITALS
RESPIRATION RATE: 20 BRPM | SYSTOLIC BLOOD PRESSURE: 133 MMHG | OXYGEN SATURATION: 99 % | DIASTOLIC BLOOD PRESSURE: 56 MMHG | TEMPERATURE: 97.8 F | HEART RATE: 68 BPM

## 2021-01-01 VITALS
WEIGHT: 187.83 LBS | RESPIRATION RATE: 22 BRPM | SYSTOLIC BLOOD PRESSURE: 159 MMHG | BODY MASS INDEX: 29.42 KG/M2 | TEMPERATURE: 97.5 F | HEART RATE: 64 BPM | DIASTOLIC BLOOD PRESSURE: 79 MMHG | OXYGEN SATURATION: 94 %

## 2021-01-01 VITALS — DIASTOLIC BLOOD PRESSURE: 53 MMHG | OXYGEN SATURATION: 95 % | HEART RATE: 70 BPM | SYSTOLIC BLOOD PRESSURE: 124 MMHG

## 2021-01-01 VITALS
SYSTOLIC BLOOD PRESSURE: 140 MMHG | DIASTOLIC BLOOD PRESSURE: 74 MMHG | TEMPERATURE: 97.7 F | HEART RATE: 86 BPM | OXYGEN SATURATION: 92 % | RESPIRATION RATE: 18 BRPM

## 2021-01-01 VITALS
SYSTOLIC BLOOD PRESSURE: 125 MMHG | DIASTOLIC BLOOD PRESSURE: 71 MMHG | HEIGHT: 66 IN | OXYGEN SATURATION: 97 % | RESPIRATION RATE: 18 BRPM | TEMPERATURE: 97.7 F | BODY MASS INDEX: 28.19 KG/M2 | HEART RATE: 71 BPM | WEIGHT: 175.4 LBS

## 2021-01-01 VITALS
BODY MASS INDEX: 28.28 KG/M2 | TEMPERATURE: 96.8 F | OXYGEN SATURATION: 98 % | DIASTOLIC BLOOD PRESSURE: 58 MMHG | SYSTOLIC BLOOD PRESSURE: 127 MMHG | WEIGHT: 175.2 LBS | HEART RATE: 82 BPM | RESPIRATION RATE: 18 BRPM

## 2021-01-01 VITALS
OXYGEN SATURATION: 95 % | RESPIRATION RATE: 20 BRPM | SYSTOLIC BLOOD PRESSURE: 123 MMHG | HEART RATE: 74 BPM | DIASTOLIC BLOOD PRESSURE: 64 MMHG | TEMPERATURE: 96.3 F

## 2021-01-01 VITALS
WEIGHT: 169 LBS | DIASTOLIC BLOOD PRESSURE: 64 MMHG | SYSTOLIC BLOOD PRESSURE: 124 MMHG | HEIGHT: 66 IN | HEART RATE: 72 BPM | BODY MASS INDEX: 27.16 KG/M2 | RESPIRATION RATE: 18 BRPM | TEMPERATURE: 97.2 F | OXYGEN SATURATION: 94 %

## 2021-01-01 VITALS
HEIGHT: 67 IN | OXYGEN SATURATION: 98 % | BODY MASS INDEX: 29.71 KG/M2 | DIASTOLIC BLOOD PRESSURE: 66 MMHG | HEART RATE: 57 BPM | WEIGHT: 189.3 LBS | TEMPERATURE: 97 F | SYSTOLIC BLOOD PRESSURE: 123 MMHG | RESPIRATION RATE: 16 BRPM

## 2021-01-01 VITALS — HEART RATE: 62 BPM | OXYGEN SATURATION: 92 % | DIASTOLIC BLOOD PRESSURE: 66 MMHG | SYSTOLIC BLOOD PRESSURE: 142 MMHG

## 2021-01-01 VITALS
HEIGHT: 67 IN | DIASTOLIC BLOOD PRESSURE: 74 MMHG | HEART RATE: 74 BPM | SYSTOLIC BLOOD PRESSURE: 133 MMHG | OXYGEN SATURATION: 95 % | OXYGEN SATURATION: 94 % | WEIGHT: 187 LBS | TEMPERATURE: 97 F | RESPIRATION RATE: 16 BRPM | RESPIRATION RATE: 18 BRPM | BODY MASS INDEX: 29.35 KG/M2 | TEMPERATURE: 96.7 F | DIASTOLIC BLOOD PRESSURE: 66 MMHG | HEART RATE: 79 BPM | SYSTOLIC BLOOD PRESSURE: 122 MMHG

## 2021-01-01 VITALS
RESPIRATION RATE: 20 BRPM | OXYGEN SATURATION: 95 % | SYSTOLIC BLOOD PRESSURE: 110 MMHG | HEART RATE: 99 BPM | TEMPERATURE: 97.1 F | DIASTOLIC BLOOD PRESSURE: 87 MMHG

## 2021-01-01 DIAGNOSIS — R53.1 WEAKNESS: ICD-10-CM

## 2021-01-01 DIAGNOSIS — Z71.89 OTHER SPECIFIED COUNSELING: ICD-10-CM

## 2021-01-01 DIAGNOSIS — Z11.59 ENCOUNTER FOR SCREENING FOR OTHER VIRAL DISEASES: ICD-10-CM

## 2021-01-01 DIAGNOSIS — Z11.52 ENCOUNTER FOR SCREENING LABORATORY TESTING FOR SEVERE ACUTE RESPIRATORY SYNDROME CORONAVIRUS 2 (SARS-COV-2): ICD-10-CM

## 2021-01-01 DIAGNOSIS — K76.82 HEPATIC ENCEPHALOPATHY (H): ICD-10-CM

## 2021-01-01 DIAGNOSIS — J90 RECURRENT RIGHT PLEURAL EFFUSION: Primary | ICD-10-CM

## 2021-01-01 DIAGNOSIS — R25.2 CRAMP OF LIMB: ICD-10-CM

## 2021-01-01 DIAGNOSIS — R06.00 DYSPNEA, UNSPECIFIED TYPE: ICD-10-CM

## 2021-01-01 DIAGNOSIS — K74.60 HEPATIC CIRRHOSIS, UNSPECIFIED HEPATIC CIRRHOSIS TYPE, UNSPECIFIED WHETHER ASCITES PRESENT (H): ICD-10-CM

## 2021-01-01 DIAGNOSIS — J90 PLEURAL EFFUSION: ICD-10-CM

## 2021-01-01 DIAGNOSIS — Z11.59 ENCOUNTER FOR SCREENING FOR OTHER VIRAL DISEASES: Primary | ICD-10-CM

## 2021-01-01 DIAGNOSIS — E86.0 DEHYDRATION: ICD-10-CM

## 2021-01-01 DIAGNOSIS — J90 RECURRENT RIGHT PLEURAL EFFUSION: ICD-10-CM

## 2021-01-01 DIAGNOSIS — R41.82 ALTERED MENTAL STATUS, UNSPECIFIED ALTERED MENTAL STATUS TYPE: Primary | ICD-10-CM

## 2021-01-01 DIAGNOSIS — Z95.828 S/P TIPS (TRANSJUGULAR INTRAHEPATIC PORTOSYSTEMIC SHUNT): ICD-10-CM

## 2021-01-01 DIAGNOSIS — J18.9 PNEUMONIA DUE TO INFECTIOUS ORGANISM, UNSPECIFIED LATERALITY, UNSPECIFIED PART OF LUNG: Primary | ICD-10-CM

## 2021-01-01 DIAGNOSIS — R06.02 SHORTNESS OF BREATH: ICD-10-CM

## 2021-01-01 DIAGNOSIS — R04.0 EPISTAXIS: ICD-10-CM

## 2021-01-01 DIAGNOSIS — K70.31 ALCOHOLIC CIRRHOSIS OF LIVER WITH ASCITES (H): Primary | ICD-10-CM

## 2021-01-01 DIAGNOSIS — R10.9 ABDOMINAL CRAMPS: ICD-10-CM

## 2021-01-01 DIAGNOSIS — K59.00 CONSTIPATION: ICD-10-CM

## 2021-01-01 DIAGNOSIS — R41.82 ALTERED MENTAL STATUS, UNSPECIFIED ALTERED MENTAL STATUS TYPE: ICD-10-CM

## 2021-01-01 DIAGNOSIS — K59.00 CONSTIPATION, UNSPECIFIED CONSTIPATION TYPE: Primary | ICD-10-CM

## 2021-01-01 LAB
ABO + RH BLD: NORMAL
ALBUMIN FLD-MCNC: 0.9 G/DL
ALBUMIN SERPL-MCNC: 2.2 G/DL (ref 3.5–5)
ALBUMIN SERPL-MCNC: 2.4 G/DL (ref 3.4–5)
ALBUMIN SERPL-MCNC: 2.4 G/DL (ref 3.5–5)
ALBUMIN SERPL-MCNC: 2.5 G/DL (ref 3.4–5)
ALBUMIN SERPL-MCNC: 2.5 G/DL (ref 3.4–5)
ALBUMIN SERPL-MCNC: 2.6 G/DL (ref 3.4–5)
ALBUMIN SERPL-MCNC: 2.7 G/DL (ref 3.4–5)
ALBUMIN SERPL-MCNC: 2.7 G/DL (ref 3.4–5)
ALBUMIN SERPL-MCNC: 2.8 G/DL (ref 3.4–5)
ALBUMIN SERPL-MCNC: 3 G/DL (ref 3.4–5)
ALBUMIN UR-MCNC: 20 MG/DL
ALBUMIN UR-MCNC: 20 MG/DL
ALBUMIN UR-MCNC: NEGATIVE MG/DL
ALP SERPL-CCNC: 101 U/L (ref 45–120)
ALP SERPL-CCNC: 104 U/L (ref 40–150)
ALP SERPL-CCNC: 108 U/L (ref 40–150)
ALP SERPL-CCNC: 112 U/L (ref 40–150)
ALP SERPL-CCNC: 115 U/L (ref 40–150)
ALP SERPL-CCNC: 115 U/L (ref 40–150)
ALP SERPL-CCNC: 116 U/L (ref 40–150)
ALP SERPL-CCNC: 119 U/L (ref 40–150)
ALP SERPL-CCNC: 125 U/L (ref 40–150)
ALP SERPL-CCNC: 137 U/L (ref 40–150)
ALP SERPL-CCNC: 145 U/L (ref 40–150)
ALP SERPL-CCNC: 341 U/L (ref 40–150)
ALP SERPL-CCNC: 373 U/L (ref 40–150)
ALP SERPL-CCNC: 95 U/L (ref 45–120)
ALP SERPL-CCNC: 96 U/L (ref 40–150)
ALP SERPL-CCNC: 99 U/L (ref 40–150)
ALT SERPL W P-5'-P-CCNC: 18 U/L (ref 0–45)
ALT SERPL W P-5'-P-CCNC: 19 U/L (ref 0–45)
ALT SERPL W P-5'-P-CCNC: 22 U/L (ref 0–70)
ALT SERPL W P-5'-P-CCNC: 22 U/L (ref 0–70)
ALT SERPL W P-5'-P-CCNC: 24 U/L (ref 0–70)
ALT SERPL W P-5'-P-CCNC: 25 U/L (ref 0–70)
ALT SERPL W P-5'-P-CCNC: 26 U/L (ref 0–70)
ALT SERPL W P-5'-P-CCNC: 27 U/L (ref 0–70)
ALT SERPL W P-5'-P-CCNC: 27 U/L (ref 0–70)
ALT SERPL W P-5'-P-CCNC: 28 U/L (ref 0–70)
ALT SERPL W P-5'-P-CCNC: 28 U/L (ref 0–70)
ALT SERPL W P-5'-P-CCNC: 29 U/L (ref 0–70)
ALT SERPL W P-5'-P-CCNC: 32 U/L (ref 0–70)
ALT SERPL W P-5'-P-CCNC: 33 U/L (ref 0–70)
ALT SERPL W P-5'-P-CCNC: 62 U/L (ref 0–70)
ALT SERPL W P-5'-P-CCNC: 75 U/L (ref 0–70)
AMMONIA PLAS-SCNC: 122 UMOL/L (ref 10–50)
AMMONIA PLAS-SCNC: 124 UMOL/L (ref 10–50)
AMMONIA PLAS-SCNC: 144 UMOL/L (ref 10–50)
AMMONIA PLAS-SCNC: 150 UMOL/L (ref 10–50)
AMMONIA PLAS-SCNC: 153 UMOL/L (ref 10–50)
AMMONIA PLAS-SCNC: 161 UMOL/L (ref 10–50)
AMMONIA PLAS-SCNC: 59 UMOL/L (ref 11–35)
AMMONIA PLAS-SCNC: 65 UMOL/L (ref 10–50)
AMMONIA PLAS-SCNC: 84 UMOL/L (ref 10–50)
AMMONIA PLAS-SCNC: 86 UMOL/L (ref 10–50)
AMMONIA PLAS-SCNC: 89 UMOL/L (ref 10–50)
AMMONIA PLAS-SCNC: 96 UMOL/L (ref 10–50)
AMMONIA PLAS-SCNC: 98 UMOL/L (ref 11–35)
ANION GAP SERPL CALCULATED.3IONS-SCNC: 10 MMOL/L (ref 3–14)
ANION GAP SERPL CALCULATED.3IONS-SCNC: 11 MMOL/L (ref 3–14)
ANION GAP SERPL CALCULATED.3IONS-SCNC: 3 MMOL/L (ref 3–14)
ANION GAP SERPL CALCULATED.3IONS-SCNC: 4 MMOL/L (ref 3–14)
ANION GAP SERPL CALCULATED.3IONS-SCNC: 5 MMOL/L (ref 3–14)
ANION GAP SERPL CALCULATED.3IONS-SCNC: 5 MMOL/L (ref 3–14)
ANION GAP SERPL CALCULATED.3IONS-SCNC: 6 MMOL/L (ref 3–14)
ANION GAP SERPL CALCULATED.3IONS-SCNC: 7 MMOL/L (ref 3–14)
ANION GAP SERPL CALCULATED.3IONS-SCNC: 7 MMOL/L (ref 5–18)
ANION GAP SERPL CALCULATED.3IONS-SCNC: 7 MMOL/L (ref 5–18)
ANION GAP SERPL CALCULATED.3IONS-SCNC: 8 MMOL/L (ref 3–14)
ANISOCYTOSIS BLD QL SMEAR: SLIGHT
APPEARANCE FLD: NORMAL
APPEARANCE UR: CLEAR
AST SERPL W P-5'-P-CCNC: 25 U/L (ref 0–40)
AST SERPL W P-5'-P-CCNC: 26 U/L (ref 0–40)
AST SERPL W P-5'-P-CCNC: 30 U/L (ref 0–45)
AST SERPL W P-5'-P-CCNC: 30 U/L (ref 0–45)
AST SERPL W P-5'-P-CCNC: 31 U/L (ref 0–45)
AST SERPL W P-5'-P-CCNC: 32 U/L (ref 0–45)
AST SERPL W P-5'-P-CCNC: 32 U/L (ref 0–45)
AST SERPL W P-5'-P-CCNC: 36 U/L (ref 0–45)
AST SERPL W P-5'-P-CCNC: 37 U/L (ref 0–45)
AST SERPL W P-5'-P-CCNC: 38 U/L (ref 0–45)
AST SERPL W P-5'-P-CCNC: 38 U/L (ref 0–45)
AST SERPL W P-5'-P-CCNC: 42 U/L (ref 0–45)
AST SERPL W P-5'-P-CCNC: 52 U/L (ref 0–45)
AST SERPL W P-5'-P-CCNC: 65 U/L (ref 0–45)
BACTERIA #/AREA URNS HPF: ABNORMAL /HPF
BACTERIA SPEC CULT: NO GROWTH
BACTERIA SPEC CULT: NO GROWTH
BASE DEFICIT BLDV-SCNC: 0.3 MMOL/L
BASOPHILS # BLD AUTO: 0 10E9/L (ref 0–0.2)
BASOPHILS NFR BLD AUTO: 0.4 %
BASOPHILS NFR BLD AUTO: 0.4 %
BASOPHILS NFR BLD AUTO: 0.5 %
BASOPHILS NFR BLD AUTO: 0.6 %
BASOPHILS NFR BLD AUTO: 0.6 %
BASOPHILS NFR BLD AUTO: 0.7 %
BASOPHILS NFR BLD AUTO: 0.8 %
BASOPHILS NFR BLD AUTO: 0.8 %
BILIRUB SERPL-MCNC: 1 MG/DL (ref 0.2–1.3)
BILIRUB SERPL-MCNC: 1.1 MG/DL (ref 0.2–1.3)
BILIRUB SERPL-MCNC: 1.3 MG/DL (ref 0.2–1.3)
BILIRUB SERPL-MCNC: 1.4 MG/DL (ref 0.2–1.3)
BILIRUB SERPL-MCNC: 1.5 MG/DL (ref 0.2–1.3)
BILIRUB SERPL-MCNC: 1.5 MG/DL (ref 0.2–1.3)
BILIRUB SERPL-MCNC: 1.7 MG/DL (ref 0.2–1.3)
BILIRUB SERPL-MCNC: 1.7 MG/DL (ref 0.2–1.3)
BILIRUB SERPL-MCNC: 1.8 MG/DL (ref 0.2–1.3)
BILIRUB SERPL-MCNC: 2.1 MG/DL (ref 0.2–1.3)
BILIRUB SERPL-MCNC: 2.2 MG/DL (ref 0–1)
BILIRUB SERPL-MCNC: 2.2 MG/DL (ref 0–1)
BILIRUB SERPL-MCNC: 2.3 MG/DL (ref 0.2–1.3)
BILIRUB SERPL-MCNC: 2.7 MG/DL (ref 0.2–1.3)
BILIRUB UR QL STRIP: NEGATIVE
BLD GP AB SCN SERPL QL: NORMAL
BLD GP AB SCN SERPL QL: NORMAL
BLOOD BANK CMNT PATIENT-IMP: NORMAL
BLOOD BANK CMNT PATIENT-IMP: NORMAL
BUN SERPL-MCNC: 13 MG/DL (ref 7–30)
BUN SERPL-MCNC: 14 MG/DL (ref 7–30)
BUN SERPL-MCNC: 14 MG/DL (ref 7–30)
BUN SERPL-MCNC: 15 MG/DL (ref 7–30)
BUN SERPL-MCNC: 16 MG/DL (ref 7–30)
BUN SERPL-MCNC: 16 MG/DL (ref 8–28)
BUN SERPL-MCNC: 17 MG/DL (ref 7–30)
BUN SERPL-MCNC: 18 MG/DL (ref 8–28)
BUN SERPL-MCNC: 19 MG/DL (ref 7–30)
CALCIUM SERPL-MCNC: 7.9 MG/DL (ref 8.5–10.5)
CALCIUM SERPL-MCNC: 7.9 MG/DL (ref 8.5–10.5)
CALCIUM SERPL-MCNC: 8.1 MG/DL (ref 8.5–10.1)
CALCIUM SERPL-MCNC: 8.2 MG/DL (ref 8.5–10.1)
CALCIUM SERPL-MCNC: 8.3 MG/DL (ref 8.5–10.1)
CALCIUM SERPL-MCNC: 8.4 MG/DL (ref 8.5–10.1)
CALCIUM SERPL-MCNC: 8.5 MG/DL (ref 8.5–10.1)
CALCIUM SERPL-MCNC: 8.5 MG/DL (ref 8.5–10.1)
CALCIUM SERPL-MCNC: 8.6 MG/DL (ref 8.5–10.1)
CALCIUM SERPL-MCNC: 8.7 MG/DL (ref 8.5–10.1)
CALCIUM SERPL-MCNC: 8.9 MG/DL (ref 8.5–10.1)
CALCIUM SERPL-MCNC: 9 MG/DL (ref 8.5–10.1)
CHLORIDE BLD-SCNC: 113 MMOL/L (ref 94–109)
CHLORIDE BLD-SCNC: 114 MMOL/L (ref 98–107)
CHLORIDE BLD-SCNC: 117 MMOL/L (ref 98–107)
CHLORIDE SERPL-SCNC: 111 MMOL/L (ref 94–109)
CHLORIDE SERPL-SCNC: 112 MMOL/L (ref 94–109)
CHLORIDE SERPL-SCNC: 114 MMOL/L (ref 94–109)
CHLORIDE SERPL-SCNC: 115 MMOL/L (ref 94–109)
CHLORIDE SERPL-SCNC: 116 MMOL/L (ref 94–109)
CHLORIDE SERPL-SCNC: 117 MMOL/L (ref 94–109)
CHLORIDE SERPL-SCNC: 118 MMOL/L (ref 94–109)
CO2 SERPL-SCNC: 17 MMOL/L (ref 20–32)
CO2 SERPL-SCNC: 18 MMOL/L (ref 20–32)
CO2 SERPL-SCNC: 19 MMOL/L (ref 20–32)
CO2 SERPL-SCNC: 19 MMOL/L (ref 22–31)
CO2 SERPL-SCNC: 19 MMOL/L (ref 22–31)
CO2 SERPL-SCNC: 20 MMOL/L (ref 20–32)
CO2 SERPL-SCNC: 20 MMOL/L (ref 20–32)
CO2 SERPL-SCNC: 21 MMOL/L (ref 20–32)
CO2 SERPL-SCNC: 22 MMOL/L (ref 20–32)
CO2 SERPL-SCNC: 23 MMOL/L (ref 20–32)
COLOR FLD: YELLOW
COLOR UR AUTO: YELLOW
COPATH REPORT: NORMAL
CREAT SERPL-MCNC: 0.8 MG/DL (ref 0.66–1.25)
CREAT SERPL-MCNC: 0.81 MG/DL (ref 0.66–1.25)
CREAT SERPL-MCNC: 0.82 MG/DL (ref 0.66–1.25)
CREAT SERPL-MCNC: 0.84 MG/DL (ref 0.66–1.25)
CREAT SERPL-MCNC: 0.85 MG/DL (ref 0.66–1.25)
CREAT SERPL-MCNC: 0.86 MG/DL (ref 0.66–1.25)
CREAT SERPL-MCNC: 0.87 MG/DL (ref 0.66–1.25)
CREAT SERPL-MCNC: 0.88 MG/DL (ref 0.66–1.25)
CREAT SERPL-MCNC: 0.88 MG/DL (ref 0.66–1.25)
CREAT SERPL-MCNC: 0.9 MG/DL (ref 0.7–1.3)
CREAT SERPL-MCNC: 0.91 MG/DL (ref 0.66–1.25)
CREAT SERPL-MCNC: 0.93 MG/DL (ref 0.66–1.25)
CREAT SERPL-MCNC: 0.95 MG/DL (ref 0.66–1.25)
CREAT SERPL-MCNC: 0.95 MG/DL (ref 0.66–1.25)
CREAT SERPL-MCNC: 0.97 MG/DL (ref 0.66–1.25)
CREAT SERPL-MCNC: 0.97 MG/DL (ref 0.7–1.3)
CREAT SERPL-MCNC: 1.03 MG/DL (ref 0.66–1.25)
CREAT SERPL-MCNC: 1.14 MG/DL (ref 0.66–1.25)
DIFFERENTIAL METHOD BLD: ABNORMAL
ELLIPTOCYTES BLD QL SMEAR: SLIGHT
EOSINOPHIL # BLD AUTO: 0.1 10E9/L (ref 0–0.7)
EOSINOPHIL # BLD AUTO: 0.2 10E9/L (ref 0–0.7)
EOSINOPHIL NFR BLD AUTO: 1.9 %
EOSINOPHIL NFR BLD AUTO: 2.3 %
EOSINOPHIL NFR BLD AUTO: 3 %
EOSINOPHIL NFR BLD AUTO: 3.3 %
EOSINOPHIL NFR BLD AUTO: 3.7 %
EOSINOPHIL NFR BLD AUTO: 4.2 %
EOSINOPHIL NFR BLD AUTO: 4.4 %
EOSINOPHIL NFR BLD AUTO: 4.8 %
EOSINOPHIL NFR BLD AUTO: 5.2 %
EOSINOPHIL NFR BLD AUTO: 5.3 %
EOSINOPHIL NFR BLD AUTO: 6.4 %
ERYTHROCYTE [DISTWIDTH] IN BLOOD BY AUTOMATED COUNT: 15.6 % (ref 10–15)
ERYTHROCYTE [DISTWIDTH] IN BLOOD BY AUTOMATED COUNT: 15.7 % (ref 10–15)
ERYTHROCYTE [DISTWIDTH] IN BLOOD BY AUTOMATED COUNT: 15.8 % (ref 10–15)
ERYTHROCYTE [DISTWIDTH] IN BLOOD BY AUTOMATED COUNT: 15.9 % (ref 10–15)
ERYTHROCYTE [DISTWIDTH] IN BLOOD BY AUTOMATED COUNT: 16 % (ref 10–15)
ERYTHROCYTE [DISTWIDTH] IN BLOOD BY AUTOMATED COUNT: 16.2 % (ref 10–15)
ERYTHROCYTE [DISTWIDTH] IN BLOOD BY AUTOMATED COUNT: 16.3 % (ref 10–15)
ERYTHROCYTE [DISTWIDTH] IN BLOOD BY AUTOMATED COUNT: 16.7 % (ref 10–15)
ERYTHROCYTE [DISTWIDTH] IN BLOOD BY AUTOMATED COUNT: 17 % (ref 10–15)
ERYTHROCYTE [DISTWIDTH] IN BLOOD BY AUTOMATED COUNT: 17.6 % (ref 10–15)
ERYTHROCYTE [DISTWIDTH] IN BLOOD BY AUTOMATED COUNT: 17.7 % (ref 10–15)
ERYTHROCYTE [DISTWIDTH] IN BLOOD BY AUTOMATED COUNT: 18.1 % (ref 10–15)
ERYTHROCYTE [DISTWIDTH] IN BLOOD BY AUTOMATED COUNT: ABNORMAL % (ref 10–15)
ETHANOL SERPL-MCNC: <0.01 G/DL
ETHANOL SERPL-MCNC: <0.01 G/DL
FIBRINOGEN PPP-MCNC: 147 MG/DL (ref 200–420)
FLUABV+SARS-COV-2+RSV PNL RESP NAA+PROBE: NEGATIVE
FLUABV+SARS-COV-2+RSV PNL RESP NAA+PROBE: NEGATIVE
FLUAV RNA RESP QL NAA+PROBE: NEGATIVE
FLUAV RNA RESP QL NAA+PROBE: NEGATIVE
FLUBV RNA RESP QL NAA+PROBE: NEGATIVE
FLUBV RNA RESP QL NAA+PROBE: NEGATIVE
GFR SERPL CREATININE-BSD FRML MDRD: 63 ML/MIN/1.73M2
GFR SERPL CREATININE-BSD FRML MDRD: 71 ML/MIN/{1.73_M2}
GFR SERPL CREATININE-BSD FRML MDRD: 76 ML/MIN/1.73M2
GFR SERPL CREATININE-BSD FRML MDRD: 76 ML/MIN/{1.73_M2}
GFR SERPL CREATININE-BSD FRML MDRD: 78 ML/MIN/{1.73_M2}
GFR SERPL CREATININE-BSD FRML MDRD: 78 ML/MIN/{1.73_M2}
GFR SERPL CREATININE-BSD FRML MDRD: 80 ML/MIN/{1.73_M2}
GFR SERPL CREATININE-BSD FRML MDRD: 82 ML/MIN/{1.73_M2}
GFR SERPL CREATININE-BSD FRML MDRD: 83 ML/MIN/1.73M2
GFR SERPL CREATININE-BSD FRML MDRD: 84 ML/MIN/{1.73_M2}
GFR SERPL CREATININE-BSD FRML MDRD: 85 ML/MIN/{1.73_M2}
GFR SERPL CREATININE-BSD FRML MDRD: 85 ML/MIN/{1.73_M2}
GFR SERPL CREATININE-BSD FRML MDRD: 86 ML/MIN/{1.73_M2}
GFR SERPL CREATININE-BSD FRML MDRD: 86 ML/MIN/{1.73_M2}
GFR SERPL CREATININE-BSD FRML MDRD: 87 ML/MIN/{1.73_M2}
GFR SERPL CREATININE-BSD FRML MDRD: 87 ML/MIN/{1.73_M2}
GLUCOSE BLD-MCNC: 120 MG/DL (ref 70–99)
GLUCOSE BLD-MCNC: 87 MG/DL (ref 70–125)
GLUCOSE BLD-MCNC: 95 MG/DL (ref 70–125)
GLUCOSE BLDC GLUCOMTR-MCNC: 100 MG/DL (ref 70–99)
GLUCOSE BLDC GLUCOMTR-MCNC: 102 MG/DL (ref 70–99)
GLUCOSE BLDC GLUCOMTR-MCNC: 104 MG/DL (ref 70–99)
GLUCOSE BLDC GLUCOMTR-MCNC: 105 MG/DL (ref 70–125)
GLUCOSE BLDC GLUCOMTR-MCNC: 106 MG/DL (ref 70–99)
GLUCOSE BLDC GLUCOMTR-MCNC: 106 MG/DL (ref 70–99)
GLUCOSE BLDC GLUCOMTR-MCNC: 108 MG/DL (ref 70–99)
GLUCOSE BLDC GLUCOMTR-MCNC: 109 MG/DL (ref 70–99)
GLUCOSE BLDC GLUCOMTR-MCNC: 110 MG/DL (ref 70–99)
GLUCOSE BLDC GLUCOMTR-MCNC: 111 MG/DL (ref 70–99)
GLUCOSE BLDC GLUCOMTR-MCNC: 114 MG/DL (ref 70–99)
GLUCOSE BLDC GLUCOMTR-MCNC: 117 MG/DL (ref 70–99)
GLUCOSE BLDC GLUCOMTR-MCNC: 117 MG/DL (ref 70–99)
GLUCOSE BLDC GLUCOMTR-MCNC: 120 MG/DL (ref 70–99)
GLUCOSE BLDC GLUCOMTR-MCNC: 120 MG/DL (ref 70–99)
GLUCOSE BLDC GLUCOMTR-MCNC: 123 MG/DL (ref 70–99)
GLUCOSE BLDC GLUCOMTR-MCNC: 123 MG/DL (ref 70–99)
GLUCOSE BLDC GLUCOMTR-MCNC: 124 MG/DL (ref 70–99)
GLUCOSE BLDC GLUCOMTR-MCNC: 128 MG/DL (ref 70–99)
GLUCOSE BLDC GLUCOMTR-MCNC: 135 MG/DL (ref 70–99)
GLUCOSE BLDC GLUCOMTR-MCNC: 136 MG/DL (ref 70–99)
GLUCOSE BLDC GLUCOMTR-MCNC: 138 MG/DL (ref 70–125)
GLUCOSE BLDC GLUCOMTR-MCNC: 141 MG/DL (ref 70–125)
GLUCOSE BLDC GLUCOMTR-MCNC: 141 MG/DL (ref 70–99)
GLUCOSE BLDC GLUCOMTR-MCNC: 142 MG/DL (ref 70–125)
GLUCOSE BLDC GLUCOMTR-MCNC: 142 MG/DL (ref 70–125)
GLUCOSE BLDC GLUCOMTR-MCNC: 148 MG/DL (ref 70–99)
GLUCOSE BLDC GLUCOMTR-MCNC: 150 MG/DL (ref 70–99)
GLUCOSE BLDC GLUCOMTR-MCNC: 159 MG/DL (ref 70–99)
GLUCOSE BLDC GLUCOMTR-MCNC: 161 MG/DL (ref 70–99)
GLUCOSE BLDC GLUCOMTR-MCNC: 163 MG/DL (ref 70–99)
GLUCOSE BLDC GLUCOMTR-MCNC: 164 MG/DL (ref 70–99)
GLUCOSE BLDC GLUCOMTR-MCNC: 165 MG/DL (ref 70–99)
GLUCOSE BLDC GLUCOMTR-MCNC: 166 MG/DL (ref 70–99)
GLUCOSE BLDC GLUCOMTR-MCNC: 173 MG/DL (ref 70–99)
GLUCOSE BLDC GLUCOMTR-MCNC: 221 MG/DL (ref 70–99)
GLUCOSE BLDC GLUCOMTR-MCNC: 227 MG/DL (ref 70–99)
GLUCOSE BLDC GLUCOMTR-MCNC: 79 MG/DL (ref 70–99)
GLUCOSE BLDC GLUCOMTR-MCNC: 79 MG/DL (ref 70–99)
GLUCOSE BLDC GLUCOMTR-MCNC: 80 MG/DL (ref 70–99)
GLUCOSE BLDC GLUCOMTR-MCNC: 81 MG/DL (ref 70–99)
GLUCOSE BLDC GLUCOMTR-MCNC: 84 MG/DL (ref 70–125)
GLUCOSE BLDC GLUCOMTR-MCNC: 85 MG/DL (ref 70–99)
GLUCOSE BLDC GLUCOMTR-MCNC: 85 MG/DL (ref 70–99)
GLUCOSE BLDC GLUCOMTR-MCNC: 87 MG/DL (ref 70–99)
GLUCOSE BLDC GLUCOMTR-MCNC: 89 MG/DL (ref 70–99)
GLUCOSE BLDC GLUCOMTR-MCNC: 89 MG/DL (ref 70–99)
GLUCOSE BLDC GLUCOMTR-MCNC: 91 MG/DL (ref 70–125)
GLUCOSE BLDC GLUCOMTR-MCNC: 91 MG/DL (ref 70–99)
GLUCOSE BLDC GLUCOMTR-MCNC: 93 MG/DL (ref 70–99)
GLUCOSE BLDC GLUCOMTR-MCNC: 95 MG/DL (ref 70–99)
GLUCOSE BLDC GLUCOMTR-MCNC: 96 MG/DL (ref 70–99)
GLUCOSE BLDC GLUCOMTR-MCNC: 97 MG/DL (ref 70–99)
GLUCOSE FLD-MCNC: 99 MG/DL
GLUCOSE SERPL-MCNC: 101 MG/DL (ref 70–99)
GLUCOSE SERPL-MCNC: 105 MG/DL (ref 70–99)
GLUCOSE SERPL-MCNC: 106 MG/DL (ref 70–99)
GLUCOSE SERPL-MCNC: 107 MG/DL (ref 70–99)
GLUCOSE SERPL-MCNC: 110 MG/DL (ref 70–99)
GLUCOSE SERPL-MCNC: 122 MG/DL (ref 70–99)
GLUCOSE SERPL-MCNC: 130 MG/DL (ref 70–99)
GLUCOSE SERPL-MCNC: 140 MG/DL (ref 70–99)
GLUCOSE SERPL-MCNC: 186 MG/DL (ref 70–99)
GLUCOSE SERPL-MCNC: 81 MG/DL (ref 70–99)
GLUCOSE SERPL-MCNC: 84 MG/DL (ref 70–99)
GLUCOSE SERPL-MCNC: 85 MG/DL (ref 70–99)
GLUCOSE SERPL-MCNC: 90 MG/DL (ref 70–99)
GLUCOSE SERPL-MCNC: 92 MG/DL (ref 70–99)
GLUCOSE SERPL-MCNC: 97 MG/DL (ref 70–99)
GLUCOSE UR STRIP-MCNC: NEGATIVE MG/DL
HBA1C MFR BLD: 5.6 % (ref 0–5.6)
HCO3 BLDV-SCNC: 22 MMOL/L (ref 21–28)
HCT VFR BLD AUTO: 26.8 % (ref 40–53)
HCT VFR BLD AUTO: 29.7 % (ref 40–53)
HCT VFR BLD AUTO: 30.1 % (ref 40–53)
HCT VFR BLD AUTO: 30.2 % (ref 40–53)
HCT VFR BLD AUTO: 30.4 % (ref 40–53)
HCT VFR BLD AUTO: 30.7 % (ref 40–53)
HCT VFR BLD AUTO: 32.5 % (ref 40–53)
HCT VFR BLD AUTO: 32.5 % (ref 40–53)
HCT VFR BLD AUTO: 32.8 % (ref 40–53)
HCT VFR BLD AUTO: 33.3 % (ref 40–53)
HCT VFR BLD AUTO: 34.2 % (ref 40–53)
HCT VFR BLD AUTO: 34.7 % (ref 40–53)
HCT VFR BLD AUTO: 35.1 % (ref 40–53)
HCT VFR BLD AUTO: 35.4 % (ref 40–53)
HCT VFR BLD AUTO: ABNORMAL % (ref 40–53)
HGB BLD-MCNC: 10 G/DL (ref 13.3–17.7)
HGB BLD-MCNC: 10.1 G/DL (ref 13.3–17.7)
HGB BLD-MCNC: 10.1 G/DL (ref 13.3–17.7)
HGB BLD-MCNC: 10.2 G/DL (ref 13.3–17.7)
HGB BLD-MCNC: 10.4 G/DL (ref 13.3–17.7)
HGB BLD-MCNC: 10.5 G/DL (ref 13.3–17.7)
HGB BLD-MCNC: 10.8 G/DL (ref 13.3–17.7)
HGB BLD-MCNC: 10.8 G/DL (ref 13.3–17.7)
HGB BLD-MCNC: 11.2 G/DL (ref 13.3–17.7)
HGB BLD-MCNC: 11.4 G/DL (ref 13.3–17.7)
HGB BLD-MCNC: 12.2 G/DL (ref 13.3–17.7)
HGB BLD-MCNC: 9.3 G/DL (ref 13.3–17.7)
HGB BLD-MCNC: 9.7 G/DL (ref 13.3–17.7)
HGB BLD-MCNC: 9.9 G/DL (ref 13.3–17.7)
HGB BLD-MCNC: ABNORMAL G/DL (ref 13.3–17.7)
HGB UR QL STRIP: ABNORMAL
HOLD SPECIMEN: NORMAL
HOLD SPECIMEN: NORMAL
HYALINE CASTS #/AREA URNS LPF: 1 /LPF (ref 0–2)
HYALINE CASTS: 1 /LPF
IMM GRANULOCYTES # BLD: 0 10E9/L (ref 0–0.4)
IMM GRANULOCYTES NFR BLD: 0 %
IMM GRANULOCYTES NFR BLD: 0.3 %
IMM GRANULOCYTES NFR BLD: 0.3 %
IMM GRANULOCYTES NFR BLD: 0.4 %
IMM GRANULOCYTES NFR BLD: 0.5 %
INR PPP: 1.46 (ref 0.86–1.14)
INR PPP: 1.47 (ref 0.86–1.14)
INR PPP: 1.49 (ref 0.86–1.14)
INR PPP: 1.49 (ref 0.86–1.14)
INR PPP: 1.53 (ref 0.86–1.14)
INR PPP: 1.79 (ref 0.85–1.15)
INR PPP: 1.83 (ref 0.86–1.14)
INTERPRETATION ECG - MUSE: NORMAL
KETONES UR STRIP-MCNC: ABNORMAL MG/DL
KETONES UR STRIP-MCNC: NEGATIVE MG/DL
KETONES UR STRIP-MCNC: NEGATIVE MG/DL
LABORATORY COMMENT REPORT: NORMAL
LACTATE BLD-SCNC: 1.1 MMOL/L (ref 0.7–2)
LACTATE BLD-SCNC: 1.4 MMOL/L (ref 0.7–2)
LACTATE BLD-SCNC: 1.6 MMOL/L (ref 0.7–2)
LACTATE BLD-SCNC: 1.9 MMOL/L (ref 0.7–2)
LACTATE BLD-SCNC: 2.1 MMOL/L (ref 0.7–2)
LACTATE BLD-SCNC: 2.3 MMOL/L (ref 0.7–2)
LACTATE SERPL-SCNC: 2.1 MMOL/L (ref 0.7–2)
LACTATE SERPL-SCNC: 2.1 MMOL/L (ref 0.7–2)
LDH FLD L TO P-CCNC: 76 U/L
LEUKOCYTE ESTERASE UR QL STRIP: NEGATIVE
LIPASE SERPL-CCNC: 378 U/L (ref 73–393)
LYMPHOCYTES # BLD AUTO: 0.3 10E9/L (ref 0.8–5.3)
LYMPHOCYTES # BLD AUTO: 0.3 10E9/L (ref 0.8–5.3)
LYMPHOCYTES # BLD AUTO: 0.4 10E9/L (ref 0.8–5.3)
LYMPHOCYTES # BLD AUTO: 0.5 10E9/L (ref 0.8–5.3)
LYMPHOCYTES # BLD AUTO: 0.6 10E9/L (ref 0.8–5.3)
LYMPHOCYTES # BLD AUTO: 0.7 10E9/L (ref 0.8–5.3)
LYMPHOCYTES NFR BLD AUTO: 13.4 %
LYMPHOCYTES NFR BLD AUTO: 14.1 %
LYMPHOCYTES NFR BLD AUTO: 16.3 %
LYMPHOCYTES NFR BLD AUTO: 18.3 %
LYMPHOCYTES NFR BLD AUTO: 19.4 %
LYMPHOCYTES NFR BLD AUTO: 19.5 %
LYMPHOCYTES NFR BLD AUTO: 19.7 %
LYMPHOCYTES NFR BLD AUTO: 20 %
LYMPHOCYTES NFR BLD AUTO: 20.1 %
LYMPHOCYTES NFR BLD AUTO: 21.2 %
LYMPHOCYTES NFR BLD AUTO: 22.1 %
LYMPHOCYTES NFR FLD MANUAL: 24 %
MACROCYTES BLD QL SMEAR: PRESENT
MAGNESIUM SERPL-MCNC: 1.4 MG/DL (ref 1.8–2.6)
MAGNESIUM SERPL-MCNC: 1.6 MG/DL (ref 1.6–2.3)
MAGNESIUM SERPL-MCNC: 1.7 MG/DL (ref 1.6–2.3)
MAGNESIUM SERPL-MCNC: 1.7 MG/DL (ref 1.6–2.3)
MAGNESIUM SERPL-MCNC: 1.8 MG/DL (ref 1.6–2.3)
MAGNESIUM SERPL-MCNC: 1.9 MG/DL (ref 1.6–2.3)
MCH RBC QN AUTO: 29.2 PG (ref 26.5–33)
MCH RBC QN AUTO: 30.4 PG (ref 26.5–33)
MCH RBC QN AUTO: 30.4 PG (ref 26.5–33)
MCH RBC QN AUTO: 30.6 PG (ref 26.5–33)
MCH RBC QN AUTO: 30.6 PG (ref 26.5–33)
MCH RBC QN AUTO: 30.9 PG (ref 26.5–33)
MCH RBC QN AUTO: 31 PG (ref 26.5–33)
MCH RBC QN AUTO: 31 PG (ref 26.5–33)
MCH RBC QN AUTO: 31.2 PG (ref 26.5–33)
MCH RBC QN AUTO: 31.2 PG (ref 26.5–33)
MCH RBC QN AUTO: 31.7 PG (ref 26.5–33)
MCH RBC QN AUTO: 32.1 PG (ref 26.5–33)
MCH RBC QN AUTO: 32.2 PG (ref 26.5–33)
MCH RBC QN AUTO: 32.4 PG (ref 26.5–33)
MCH RBC QN AUTO: 32.4 PG (ref 26.5–33)
MCH RBC QN AUTO: 32.9 PG (ref 26.5–33)
MCH RBC QN AUTO: ABNORMAL PG (ref 26.5–33)
MCHC RBC AUTO-ENTMCNC: 31.5 G/DL (ref 31.5–36.5)
MCHC RBC AUTO-ENTMCNC: 32.2 G/DL (ref 31.5–36.5)
MCHC RBC AUTO-ENTMCNC: 32.5 G/DL (ref 31.5–36.5)
MCHC RBC AUTO-ENTMCNC: 32.7 G/DL (ref 31.5–36.5)
MCHC RBC AUTO-ENTMCNC: 32.9 G/DL (ref 31.5–36.5)
MCHC RBC AUTO-ENTMCNC: 33.2 G/DL (ref 31.5–36.5)
MCHC RBC AUTO-ENTMCNC: 33.6 G/DL (ref 31.5–36.5)
MCHC RBC AUTO-ENTMCNC: 34.3 G/DL (ref 31.5–36.5)
MCHC RBC AUTO-ENTMCNC: 34.4 G/DL (ref 31.5–36.5)
MCHC RBC AUTO-ENTMCNC: 34.5 G/DL (ref 31.5–36.5)
MCHC RBC AUTO-ENTMCNC: 34.7 G/DL (ref 31.5–36.5)
MCHC RBC AUTO-ENTMCNC: 35.1 G/DL (ref 31.5–36.5)
MCHC RBC AUTO-ENTMCNC: ABNORMAL G/DL (ref 31.5–36.5)
MCV RBC AUTO: 91 FL (ref 78–100)
MCV RBC AUTO: 93 FL (ref 78–100)
MCV RBC AUTO: 94 FL (ref 78–100)
MCV RBC AUTO: 95 FL (ref 78–100)
MCV RBC AUTO: 96 FL (ref 78–100)
MCV RBC AUTO: 97 FL (ref 78–100)
MCV RBC AUTO: ABNORMAL FL (ref 78–100)
MONOCYTES # BLD AUTO: 0.2 10E9/L (ref 0–1.3)
MONOCYTES # BLD AUTO: 0.2 10E9/L (ref 0–1.3)
MONOCYTES # BLD AUTO: 0.3 10E9/L (ref 0–1.3)
MONOCYTES # BLD AUTO: 0.3 10E9/L (ref 0–1.3)
MONOCYTES # BLD AUTO: 0.4 10E9/L (ref 0–1.3)
MONOCYTES NFR BLD AUTO: 11.7 %
MONOCYTES NFR BLD AUTO: 12.2 %
MONOCYTES NFR BLD AUTO: 12.5 %
MONOCYTES NFR BLD AUTO: 12.8 %
MONOCYTES NFR BLD AUTO: 13.1 %
MONOCYTES NFR BLD AUTO: 13.3 %
MONOCYTES NFR BLD AUTO: 14.1 %
MONOCYTES NFR BLD AUTO: 14.3 %
MONOCYTES NFR BLD AUTO: 14.4 %
MONOCYTES NFR BLD AUTO: 15.1 %
MONOCYTES NFR BLD AUTO: 16.1 %
MONOS+MACROS NFR FLD MANUAL: 64 %
MUCOUS THREADS #/AREA URNS LPF: PRESENT /LPF
NEUTROPHILS # BLD AUTO: 0.9 10E9/L (ref 1.6–8.3)
NEUTROPHILS # BLD AUTO: 1.1 10E9/L (ref 1.6–8.3)
NEUTROPHILS # BLD AUTO: 1.1 10E9/L (ref 1.6–8.3)
NEUTROPHILS # BLD AUTO: 1.5 10E9/L (ref 1.6–8.3)
NEUTROPHILS # BLD AUTO: 1.5 10E9/L (ref 1.6–8.3)
NEUTROPHILS # BLD AUTO: 1.6 10E9/L (ref 1.6–8.3)
NEUTROPHILS # BLD AUTO: 1.7 10E9/L (ref 1.6–8.3)
NEUTROPHILS # BLD AUTO: 1.7 10E9/L (ref 1.6–8.3)
NEUTROPHILS # BLD AUTO: 1.8 10E9/L (ref 1.6–8.3)
NEUTROPHILS # BLD AUTO: 2.2 10E9/L (ref 1.6–8.3)
NEUTROPHILS # BLD AUTO: 2.3 10E9/L (ref 1.6–8.3)
NEUTROPHILS NFR BLD AUTO: 56.5 %
NEUTROPHILS NFR BLD AUTO: 58.8 %
NEUTROPHILS NFR BLD AUTO: 59.8 %
NEUTROPHILS NFR BLD AUTO: 60.3 %
NEUTROPHILS NFR BLD AUTO: 61.8 %
NEUTROPHILS NFR BLD AUTO: 61.8 %
NEUTROPHILS NFR BLD AUTO: 62.7 %
NEUTROPHILS NFR BLD AUTO: 63.4 %
NEUTROPHILS NFR BLD AUTO: 67.4 %
NEUTROPHILS NFR BLD AUTO: 68.7 %
NEUTROPHILS NFR BLD AUTO: 71 %
NEUTS BAND NFR FLD MANUAL: 5 %
NITRATE UR QL: NEGATIVE
NRBC # BLD AUTO: 0 10*3/UL
NRBC BLD AUTO-RTO: 0 /100
NT-PROBNP SERPL-MCNC: 277 PG/ML (ref 0–1800)
NT-PROBNP SERPL-MCNC: 344 PG/ML (ref 0–900)
O2/TOTAL GAS SETTING VFR VENT: ABNORMAL %
OTHER CELLS FLD MANUAL: 7 %
PCO2 BLDV: 27 MM HG (ref 40–50)
PH BLDV: 7.52 PH (ref 7.32–7.43)
PH UR STRIP: 5 [PH] (ref 5–7)
PH UR STRIP: 5.5 PH (ref 5–7)
PH UR STRIP: 7 PH (ref 5–7)
PHOSPHATE SERPL-MCNC: 2.7 MG/DL (ref 2.5–4.5)
PHOSPHATE SERPL-MCNC: 2.9 MG/DL (ref 2.5–4.5)
PHOSPHATE SERPL-MCNC: 3.3 MG/DL (ref 2.5–4.5)
PLAT MORPH BLD: NORMAL
PLATELET # BLD AUTO: 39 10E9/L (ref 150–450)
PLATELET # BLD AUTO: 40 10E3/UL (ref 150–450)
PLATELET # BLD AUTO: 41 10E9/L (ref 150–450)
PLATELET # BLD AUTO: 44 10E9/L (ref 150–450)
PLATELET # BLD AUTO: 45 10E9/L (ref 150–450)
PLATELET # BLD AUTO: 46 10E9/L (ref 150–450)
PLATELET # BLD AUTO: 48 10E9/L (ref 150–450)
PLATELET # BLD AUTO: 50 10E9/L (ref 150–450)
PLATELET # BLD AUTO: 53 10E9/L (ref 150–450)
PLATELET # BLD AUTO: 54 10E9/L (ref 150–450)
PLATELET # BLD AUTO: 54 10E9/L (ref 150–450)
PLATELET # BLD AUTO: 55 10E9/L (ref 150–450)
PLATELET # BLD AUTO: 59 10E3/UL (ref 150–450)
PLATELET # BLD AUTO: 61 10E9/L (ref 150–450)
PLATELET # BLD AUTO: ABNORMAL 10E9/L (ref 150–450)
PLATELET # BLD EST: ABNORMAL 10*3/UL
PO2 BLDV: 74 MM HG (ref 25–47)
POTASSIUM BLD-SCNC: 3.8 MMOL/L (ref 3.5–5)
POTASSIUM BLD-SCNC: 4 MMOL/L (ref 3.5–5)
POTASSIUM BLD-SCNC: 4.1 MMOL/L (ref 3.4–5.3)
POTASSIUM SERPL-SCNC: 3.5 MMOL/L (ref 3.4–5.3)
POTASSIUM SERPL-SCNC: 3.6 MMOL/L (ref 3.4–5.3)
POTASSIUM SERPL-SCNC: 3.6 MMOL/L (ref 3.4–5.3)
POTASSIUM SERPL-SCNC: 3.7 MMOL/L (ref 3.4–5.3)
POTASSIUM SERPL-SCNC: 3.8 MMOL/L (ref 3.4–5.3)
POTASSIUM SERPL-SCNC: 3.9 MMOL/L (ref 3.4–5.3)
POTASSIUM SERPL-SCNC: 4 MMOL/L (ref 3.4–5.3)
POTASSIUM SERPL-SCNC: 4 MMOL/L (ref 3.4–5.3)
POTASSIUM SERPL-SCNC: 4.2 MMOL/L (ref 3.4–5.3)
POTASSIUM SERPL-SCNC: 4.2 MMOL/L (ref 3.4–5.3)
POTASSIUM SERPL-SCNC: 4.3 MMOL/L (ref 3.4–5.3)
POTASSIUM SERPL-SCNC: 4.4 MMOL/L (ref 3.4–5.3)
POTASSIUM SERPL-SCNC: 4.4 MMOL/L (ref 3.4–5.3)
PROT FLD-MCNC: 1.7 G/DL
PROT SERPL-MCNC: 4.9 G/DL (ref 6–8)
PROT SERPL-MCNC: 5.3 G/DL (ref 6–8)
PROT SERPL-MCNC: 5.9 G/DL (ref 6.8–8.8)
PROT SERPL-MCNC: 6.1 G/DL (ref 6.8–8.8)
PROT SERPL-MCNC: 6.2 G/DL (ref 6.8–8.8)
PROT SERPL-MCNC: 6.2 G/DL (ref 6.8–8.8)
PROT SERPL-MCNC: 6.3 G/DL (ref 6.8–8.8)
PROT SERPL-MCNC: 6.5 G/DL (ref 6.8–8.8)
PROT SERPL-MCNC: 6.5 G/DL (ref 6.8–8.8)
PROT SERPL-MCNC: 6.7 G/DL (ref 6.8–8.8)
PROT SERPL-MCNC: 6.9 G/DL (ref 6.8–8.8)
RBC # BLD AUTO: 2.89 10E6/UL (ref 4.4–5.9)
RBC # BLD AUTO: 3.13 10E12/L (ref 4.4–5.9)
RBC # BLD AUTO: 3.15 10E6/UL (ref 4.4–5.9)
RBC # BLD AUTO: 3.19 10E12/L (ref 4.4–5.9)
RBC # BLD AUTO: 3.2 10E12/L (ref 4.4–5.9)
RBC # BLD AUTO: 3.21 10E12/L (ref 4.4–5.9)
RBC # BLD AUTO: 3.21 10E12/L (ref 4.4–5.9)
RBC # BLD AUTO: 3.32 10E12/L (ref 4.4–5.9)
RBC # BLD AUTO: 3.36 10E12/L (ref 4.4–5.9)
RBC # BLD AUTO: 3.47 10E12/L (ref 4.4–5.9)
RBC # BLD AUTO: 3.48 10E12/L (ref 4.4–5.9)
RBC # BLD AUTO: 3.59 10E12/L (ref 4.4–5.9)
RBC # BLD AUTO: 3.69 10E12/L (ref 4.4–5.9)
RBC # BLD AUTO: 3.73 10E12/L (ref 4.4–5.9)
RBC # BLD AUTO: 3.73 10E12/L (ref 4.4–5.9)
RBC # BLD AUTO: 3.91 10E12/L (ref 4.4–5.9)
RBC # BLD AUTO: ABNORMAL 10E12/L (ref 4.4–5.9)
RBC #/AREA URNS AUTO: 111 /HPF (ref 0–2)
RBC #/AREA URNS AUTO: 13 /HPF (ref 0–2)
RBC MORPH BLD: NORMAL
RBC URINE: 3 /HPF
RSV RNA SPEC QL NAA+PROBE: NEGATIVE
RSV RNA SPEC QL NAA+PROBE: NORMAL
RSV RNA SPEC QL NAA+PROBE: NORMAL
SARS-COV-2 RNA RESP QL NAA+PROBE: NEGATIVE
SARS-COV-2 RNA RESP QL NAA+PROBE: NORMAL
SARS-COV-2 RNA SPEC QL NAA+PROBE: NEGATIVE
SODIUM SERPL-SCNC: 138 MMOL/L (ref 133–144)
SODIUM SERPL-SCNC: 138 MMOL/L (ref 133–144)
SODIUM SERPL-SCNC: 140 MMOL/L (ref 133–144)
SODIUM SERPL-SCNC: 140 MMOL/L (ref 136–145)
SODIUM SERPL-SCNC: 141 MMOL/L (ref 133–144)
SODIUM SERPL-SCNC: 142 MMOL/L (ref 133–144)
SODIUM SERPL-SCNC: 143 MMOL/L (ref 133–144)
SODIUM SERPL-SCNC: 143 MMOL/L (ref 133–144)
SODIUM SERPL-SCNC: 143 MMOL/L (ref 136–145)
SODIUM SERPL-SCNC: 144 MMOL/L (ref 133–144)
SODIUM SERPL-SCNC: 144 MMOL/L (ref 133–144)
SODIUM SERPL-SCNC: 145 MMOL/L (ref 133–144)
SODIUM SERPL-SCNC: 145 MMOL/L (ref 133–144)
SOURCE: ABNORMAL
SOURCE: ABNORMAL
SP GR UR STRIP: 1.01 (ref 1–1.03)
SP GR UR STRIP: 1.02 (ref 1–1.03)
SP GR UR STRIP: 1.02 (ref 1–1.03)
SPECIMEN EXP DATE BLD: NORMAL
SPECIMEN EXP DATE BLD: NORMAL
SPECIMEN SOURCE FLD: NORMAL
SPECIMEN SOURCE: NORMAL
SQUAMOUS #/AREA URNS AUTO: 0 /HPF (ref 0–1)
SQUAMOUS EPITHELIAL: <1 /HPF
TROPONIN I SERPL-MCNC: <0.015 UG/L (ref 0–0.04)
TSH SERPL DL<=0.005 MIU/L-ACNC: 3.18 MU/L (ref 0.4–4)
UROBILINOGEN UR STRIP-MCNC: 4 MG/DL (ref 0–2)
UROBILINOGEN UR STRIP-MCNC: >12 MG/DL (ref 0–2)
UROBILINOGEN UR STRIP-MCNC: NORMAL MG/DL
WBC # BLD AUTO: 1.5 10E9/L (ref 4–11)
WBC # BLD AUTO: 1.8 10E9/L (ref 4–11)
WBC # BLD AUTO: 1.9 10E9/L (ref 4–11)
WBC # BLD AUTO: 1.9 10E9/L (ref 4–11)
WBC # BLD AUTO: 2.1 10E9/L (ref 4–11)
WBC # BLD AUTO: 2.4 10E9/L (ref 4–11)
WBC # BLD AUTO: 2.5 10E9/L (ref 4–11)
WBC # BLD AUTO: 2.5 10E9/L (ref 4–11)
WBC # BLD AUTO: 2.6 10E9/L (ref 4–11)
WBC # BLD AUTO: 2.7 10E9/L (ref 4–11)
WBC # BLD AUTO: 2.7 10E9/L (ref 4–11)
WBC # BLD AUTO: 3.1 10E3/UL (ref 4–11)
WBC # BLD AUTO: 3.1 10E9/L (ref 4–11)
WBC # BLD AUTO: 3.2 10E9/L (ref 4–11)
WBC # BLD AUTO: 3.4 10E3/UL (ref 4–11)
WBC # BLD AUTO: 3.6 10E9/L (ref 4–11)
WBC # BLD AUTO: ABNORMAL 10E9/L (ref 4–11)
WBC # FLD AUTO: 801 /UL
WBC #/AREA URNS AUTO: 1 /HPF (ref 0–5)
WBC #/AREA URNS AUTO: 3 /HPF (ref 0–5)
WBC URINE: <1 /HPF

## 2021-01-01 PROCEDURE — 80053 COMPREHEN METABOLIC PANEL: CPT | Performed by: STUDENT IN AN ORGANIZED HEALTH CARE EDUCATION/TRAINING PROGRAM

## 2021-01-01 PROCEDURE — 82140 ASSAY OF AMMONIA: CPT | Performed by: INTERNAL MEDICINE

## 2021-01-01 PROCEDURE — 96372 THER/PROPH/DIAG INJ SC/IM: CPT | Performed by: HOSPITALIST

## 2021-01-01 PROCEDURE — 83605 ASSAY OF LACTIC ACID: CPT | Performed by: EMERGENCY MEDICINE

## 2021-01-01 PROCEDURE — 70450 CT HEAD/BRAIN W/O DYE: CPT

## 2021-01-01 PROCEDURE — 83605 ASSAY OF LACTIC ACID: CPT | Mod: 91 | Performed by: NURSE PRACTITIONER

## 2021-01-01 PROCEDURE — 85025 COMPLETE CBC W/AUTO DIFF WBC: CPT | Performed by: EMERGENCY MEDICINE

## 2021-01-01 PROCEDURE — 80053 COMPREHEN METABOLIC PANEL: CPT | Performed by: EMERGENCY MEDICINE

## 2021-01-01 PROCEDURE — 84132 ASSAY OF SERUM POTASSIUM: CPT | Performed by: INTERNAL MEDICINE

## 2021-01-01 PROCEDURE — 83735 ASSAY OF MAGNESIUM: CPT | Performed by: HOSPITALIST

## 2021-01-01 PROCEDURE — 71045 X-RAY EXAM CHEST 1 VIEW: CPT

## 2021-01-01 PROCEDURE — 82140 ASSAY OF AMMONIA: CPT | Performed by: EMERGENCY MEDICINE

## 2021-01-01 PROCEDURE — 82077 ASSAY SPEC XCP UR&BREATH IA: CPT | Performed by: EMERGENCY MEDICINE

## 2021-01-01 PROCEDURE — 250N000013 HC RX MED GY IP 250 OP 250 PS 637: Performed by: INTERNAL MEDICINE

## 2021-01-01 PROCEDURE — 83036 HEMOGLOBIN GLYCOSYLATED A1C: CPT | Performed by: EMERGENCY MEDICINE

## 2021-01-01 PROCEDURE — 250N000011 HC RX IP 250 OP 636: Performed by: EMERGENCY MEDICINE

## 2021-01-01 PROCEDURE — 84100 ASSAY OF PHOSPHORUS: CPT | Performed by: HOSPITALIST

## 2021-01-01 PROCEDURE — 36415 COLL VENOUS BLD VENIPUNCTURE: CPT | Performed by: INTERNAL MEDICINE

## 2021-01-01 PROCEDURE — 32554 ASPIRATE PLEURA W/O IMAGING: CPT

## 2021-01-01 PROCEDURE — 250N000013 HC RX MED GY IP 250 OP 250 PS 637: Performed by: EMERGENCY MEDICINE

## 2021-01-01 PROCEDURE — 85027 COMPLETE CBC AUTOMATED: CPT | Performed by: NURSE PRACTITIONER

## 2021-01-01 PROCEDURE — 84484 ASSAY OF TROPONIN QUANT: CPT | Performed by: EMERGENCY MEDICINE

## 2021-01-01 PROCEDURE — 250N000013 HC RX MED GY IP 250 OP 250 PS 637: Performed by: HOSPITALIST

## 2021-01-01 PROCEDURE — 87635 SARS-COV-2 COVID-19 AMP PRB: CPT | Performed by: EMERGENCY MEDICINE

## 2021-01-01 PROCEDURE — 72125 CT NECK SPINE W/O DYE: CPT | Mod: 26 | Performed by: RADIOLOGY

## 2021-01-01 PROCEDURE — 258N000003 HC RX IP 258 OP 636: Performed by: EMERGENCY MEDICINE

## 2021-01-01 PROCEDURE — 250N000013 HC RX MED GY IP 250 OP 250 PS 637: Performed by: STUDENT IN AN ORGANIZED HEALTH CARE EDUCATION/TRAINING PROGRAM

## 2021-01-01 PROCEDURE — 86901 BLOOD TYPING SEROLOGIC RH(D): CPT | Performed by: EMERGENCY MEDICINE

## 2021-01-01 PROCEDURE — 85027 COMPLETE CBC AUTOMATED: CPT | Performed by: STUDENT IN AN ORGANIZED HEALTH CARE EDUCATION/TRAINING PROGRAM

## 2021-01-01 PROCEDURE — 82140 ASSAY OF AMMONIA: CPT | Performed by: NURSE PRACTITIONER

## 2021-01-01 PROCEDURE — 82803 BLOOD GASES ANY COMBINATION: CPT | Performed by: EMERGENCY MEDICINE

## 2021-01-01 PROCEDURE — 96360 HYDRATION IV INFUSION INIT: CPT

## 2021-01-01 PROCEDURE — 80053 COMPREHEN METABOLIC PANEL: CPT | Performed by: PHYSICIAN ASSISTANT

## 2021-01-01 PROCEDURE — 97165 OT EVAL LOW COMPLEX 30 MIN: CPT | Mod: GO | Performed by: OCCUPATIONAL THERAPIST

## 2021-01-01 PROCEDURE — 86900 BLOOD TYPING SEROLOGIC ABO: CPT | Performed by: EMERGENCY MEDICINE

## 2021-01-01 PROCEDURE — 83605 ASSAY OF LACTIC ACID: CPT | Performed by: INTERNAL MEDICINE

## 2021-01-01 PROCEDURE — 99233 SBSQ HOSP IP/OBS HIGH 50: CPT | Performed by: STUDENT IN AN ORGANIZED HEALTH CARE EDUCATION/TRAINING PROGRAM

## 2021-01-01 PROCEDURE — 250N000013 HC RX MED GY IP 250 OP 250 PS 637: Performed by: PHYSICIAN ASSISTANT

## 2021-01-01 PROCEDURE — 999N001017 HC STATISTIC GLUCOSE BY METER IP

## 2021-01-01 PROCEDURE — 93975 VASCULAR STUDY: CPT | Mod: 26 | Performed by: RADIOLOGY

## 2021-01-01 PROCEDURE — 85027 COMPLETE CBC AUTOMATED: CPT | Performed by: HOSPITALIST

## 2021-01-01 PROCEDURE — 74019 RADEX ABDOMEN 2 VIEWS: CPT

## 2021-01-01 PROCEDURE — C9803 HOPD COVID-19 SPEC COLLECT: HCPCS

## 2021-01-01 PROCEDURE — 71045 X-RAY EXAM CHEST 1 VIEW: CPT | Mod: 59

## 2021-01-01 PROCEDURE — 70450 CT HEAD/BRAIN W/O DYE: CPT | Mod: 26 | Performed by: RADIOLOGY

## 2021-01-01 PROCEDURE — 97116 GAIT TRAINING THERAPY: CPT | Mod: GP

## 2021-01-01 PROCEDURE — 99207 PR CDG-CUT & PASTE-POTENTIAL IMPACT ON LEVEL: CPT | Performed by: STUDENT IN AN ORGANIZED HEALTH CARE EDUCATION/TRAINING PROGRAM

## 2021-01-01 PROCEDURE — 99207 PR CONSULT E&M CHANGED TO INITIAL LEVEL: CPT | Performed by: NURSE PRACTITIONER

## 2021-01-01 PROCEDURE — 82077 ASSAY SPEC XCP UR&BREATH IA: CPT | Performed by: NURSE PRACTITIONER

## 2021-01-01 PROCEDURE — 97161 PT EVAL LOW COMPLEX 20 MIN: CPT | Mod: GP

## 2021-01-01 PROCEDURE — 36415 COLL VENOUS BLD VENIPUNCTURE: CPT | Performed by: PHYSICIAN ASSISTANT

## 2021-01-01 PROCEDURE — 36415 COLL VENOUS BLD VENIPUNCTURE: CPT | Performed by: HOSPITALIST

## 2021-01-01 PROCEDURE — 84157 ASSAY OF PROTEIN OTHER: CPT | Performed by: INTERNAL MEDICINE

## 2021-01-01 PROCEDURE — 83735 ASSAY OF MAGNESIUM: CPT | Performed by: EMERGENCY MEDICINE

## 2021-01-01 PROCEDURE — 93010 ELECTROCARDIOGRAM REPORT: CPT | Performed by: EMERGENCY MEDICINE

## 2021-01-01 PROCEDURE — 250N000009 HC RX 250: Performed by: INTERNAL MEDICINE

## 2021-01-01 PROCEDURE — 99221 1ST HOSP IP/OBS SF/LOW 40: CPT | Mod: GC | Performed by: INTERNAL MEDICINE

## 2021-01-01 PROCEDURE — 99285 EMERGENCY DEPT VISIT HI MDM: CPT | Mod: 25 | Performed by: EMERGENCY MEDICINE

## 2021-01-01 PROCEDURE — 96361 HYDRATE IV INFUSION ADD-ON: CPT

## 2021-01-01 PROCEDURE — 250N000009 HC RX 250: Performed by: RADIOLOGY

## 2021-01-01 PROCEDURE — 32555 ASPIRATE PLEURA W/ IMAGING: CPT

## 2021-01-01 PROCEDURE — 99205 OFFICE O/P NEW HI 60 MIN: CPT | Performed by: INTERNAL MEDICINE

## 2021-01-01 PROCEDURE — 99223 1ST HOSP IP/OBS HIGH 75: CPT | Mod: AI | Performed by: PHYSICIAN ASSISTANT

## 2021-01-01 PROCEDURE — 83735 ASSAY OF MAGNESIUM: CPT | Performed by: INTERNAL MEDICINE

## 2021-01-01 PROCEDURE — 87636 SARSCOV2 & INF A&B AMP PRB: CPT | Performed by: EMERGENCY MEDICINE

## 2021-01-01 PROCEDURE — P9047 ALBUMIN (HUMAN), 25%, 50ML: HCPCS | Performed by: PHYSICIAN ASSISTANT

## 2021-01-01 PROCEDURE — 120N000002 HC R&B MED SURG/OB UMMC

## 2021-01-01 PROCEDURE — 80053 COMPREHEN METABOLIC PANEL: CPT | Performed by: INTERNAL MEDICINE

## 2021-01-01 PROCEDURE — 97530 THERAPEUTIC ACTIVITIES: CPT | Mod: GO | Performed by: OCCUPATIONAL THERAPIST

## 2021-01-01 PROCEDURE — 258N000003 HC RX IP 258 OP 636: Performed by: NURSE PRACTITIONER

## 2021-01-01 PROCEDURE — 84443 ASSAY THYROID STIM HORMONE: CPT | Performed by: EMERGENCY MEDICINE

## 2021-01-01 PROCEDURE — 88305 TISSUE EXAM BY PATHOLOGIST: CPT | Mod: 26

## 2021-01-01 PROCEDURE — 99222 1ST HOSP IP/OBS MODERATE 55: CPT | Performed by: NURSE PRACTITIONER

## 2021-01-01 PROCEDURE — 71045 X-RAY EXAM CHEST 1 VIEW: CPT | Mod: XU

## 2021-01-01 PROCEDURE — 93975 VASCULAR STUDY: CPT

## 2021-01-01 PROCEDURE — G0378 HOSPITAL OBSERVATION PER HR: HCPCS

## 2021-01-01 PROCEDURE — 99233 SBSQ HOSP IP/OBS HIGH 50: CPT | Performed by: INTERNAL MEDICINE

## 2021-01-01 PROCEDURE — 36415 COLL VENOUS BLD VENIPUNCTURE: CPT | Performed by: EMERGENCY MEDICINE

## 2021-01-01 PROCEDURE — 99222 1ST HOSP IP/OBS MODERATE 55: CPT | Performed by: HOSPITALIST

## 2021-01-01 PROCEDURE — 99239 HOSP IP/OBS DSCHRG MGMT >30: CPT | Performed by: HOSPITALIST

## 2021-01-01 PROCEDURE — 97165 OT EVAL LOW COMPLEX 30 MIN: CPT | Mod: GO

## 2021-01-01 PROCEDURE — 71045 X-RAY EXAM CHEST 1 VIEW: CPT | Mod: 26 | Performed by: RADIOLOGY

## 2021-01-01 PROCEDURE — U0005 INFEC AGEN DETEC AMPLI PROBE: HCPCS | Performed by: INTERNAL MEDICINE

## 2021-01-01 PROCEDURE — 250N000011 HC RX IP 250 OP 636: Performed by: PHYSICIAN ASSISTANT

## 2021-01-01 PROCEDURE — 81001 URINALYSIS AUTO W/SCOPE: CPT | Performed by: EMERGENCY MEDICINE

## 2021-01-01 PROCEDURE — 250N000011 HC RX IP 250 OP 636: Performed by: INTERNAL MEDICINE

## 2021-01-01 PROCEDURE — 250N000012 HC RX MED GY IP 250 OP 636 PS 637: Performed by: INTERNAL MEDICINE

## 2021-01-01 PROCEDURE — 80048 BASIC METABOLIC PNL TOTAL CA: CPT | Performed by: EMERGENCY MEDICINE

## 2021-01-01 PROCEDURE — 99285 EMERGENCY DEPT VISIT HI MDM: CPT | Mod: 25

## 2021-01-01 PROCEDURE — 250N000013 HC RX MED GY IP 250 OP 250 PS 637: Performed by: NURSE PRACTITIONER

## 2021-01-01 PROCEDURE — 88305 TISSUE EXAM BY PATHOLOGIST: CPT | Mod: TC | Performed by: INTERNAL MEDICINE

## 2021-01-01 PROCEDURE — 99291 CRITICAL CARE FIRST HOUR: CPT | Mod: 25 | Performed by: EMERGENCY MEDICINE

## 2021-01-01 PROCEDURE — 93005 ELECTROCARDIOGRAM TRACING: CPT | Performed by: EMERGENCY MEDICINE

## 2021-01-01 PROCEDURE — 120N000001 HC R&B MED SURG/OB

## 2021-01-01 PROCEDURE — 71260 CT THORAX DX C+: CPT

## 2021-01-01 PROCEDURE — 85610 PROTHROMBIN TIME: CPT | Performed by: EMERGENCY MEDICINE

## 2021-01-01 PROCEDURE — 85610 PROTHROMBIN TIME: CPT | Performed by: PHYSICIAN ASSISTANT

## 2021-01-01 PROCEDURE — 272N000706 US THORACENTESIS

## 2021-01-01 PROCEDURE — 999N000065 XR CHEST PORT 1 VW

## 2021-01-01 PROCEDURE — 72125 CT NECK SPINE W/O DYE: CPT

## 2021-01-01 PROCEDURE — 999N001014 HC STATISTIC CYTO WRIGHT STAIN TC: Performed by: INTERNAL MEDICINE

## 2021-01-01 PROCEDURE — 93005 ELECTROCARDIOGRAM TRACING: CPT | Mod: 59

## 2021-01-01 PROCEDURE — 999N001018 HC STATISTIC H-CELL BLOCK W/STAIN: Performed by: INTERNAL MEDICINE

## 2021-01-01 PROCEDURE — 99291 CRITICAL CARE FIRST HOUR: CPT | Mod: 25

## 2021-01-01 PROCEDURE — 36415 COLL VENOUS BLD VENIPUNCTURE: CPT | Performed by: NURSE PRACTITIONER

## 2021-01-01 PROCEDURE — 83880 ASSAY OF NATRIURETIC PEPTIDE: CPT | Performed by: EMERGENCY MEDICINE

## 2021-01-01 PROCEDURE — 99223 1ST HOSP IP/OBS HIGH 75: CPT | Mod: AI | Performed by: INTERNAL MEDICINE

## 2021-01-01 PROCEDURE — 83615 LACTATE (LD) (LDH) ENZYME: CPT | Performed by: INTERNAL MEDICINE

## 2021-01-01 PROCEDURE — 87040 BLOOD CULTURE FOR BACTERIA: CPT | Performed by: EMERGENCY MEDICINE

## 2021-01-01 PROCEDURE — 99232 SBSQ HOSP IP/OBS MODERATE 35: CPT | Performed by: INTERNAL MEDICINE

## 2021-01-01 PROCEDURE — 84100 ASSAY OF PHOSPHORUS: CPT | Performed by: PHYSICIAN ASSISTANT

## 2021-01-01 PROCEDURE — 80053 COMPREHEN METABOLIC PANEL: CPT | Performed by: NURSE PRACTITIONER

## 2021-01-01 PROCEDURE — 36415 COLL VENOUS BLD VENIPUNCTURE: CPT | Performed by: STUDENT IN AN ORGANIZED HEALTH CARE EDUCATION/TRAINING PROGRAM

## 2021-01-01 PROCEDURE — 83735 ASSAY OF MAGNESIUM: CPT | Performed by: PHYSICIAN ASSISTANT

## 2021-01-01 PROCEDURE — 97130 THER IVNTJ EA ADDL 15 MIN: CPT | Mod: GO

## 2021-01-01 PROCEDURE — 250N000012 HC RX MED GY IP 250 OP 636 PS 637: Performed by: HOSPITALIST

## 2021-01-01 PROCEDURE — 86850 RBC ANTIBODY SCREEN: CPT | Performed by: EMERGENCY MEDICINE

## 2021-01-01 PROCEDURE — 99239 HOSP IP/OBS DSCHRG MGMT >30: CPT | Performed by: STUDENT IN AN ORGANIZED HEALTH CARE EDUCATION/TRAINING PROGRAM

## 2021-01-01 PROCEDURE — 93005 ELECTROCARDIOGRAM TRACING: CPT

## 2021-01-01 PROCEDURE — 99232 SBSQ HOSP IP/OBS MODERATE 35: CPT | Performed by: STUDENT IN AN ORGANIZED HEALTH CARE EDUCATION/TRAINING PROGRAM

## 2021-01-01 PROCEDURE — 83690 ASSAY OF LIPASE: CPT | Performed by: EMERGENCY MEDICINE

## 2021-01-01 PROCEDURE — 71046 X-RAY EXAM CHEST 2 VIEWS: CPT

## 2021-01-01 PROCEDURE — 80048 BASIC METABOLIC PNL TOTAL CA: CPT | Performed by: INTERNAL MEDICINE

## 2021-01-01 PROCEDURE — 85610 PROTHROMBIN TIME: CPT | Performed by: HOSPITALIST

## 2021-01-01 PROCEDURE — 89051 BODY FLUID CELL COUNT: CPT | Performed by: INTERNAL MEDICINE

## 2021-01-01 PROCEDURE — 76705 ECHO EXAM OF ABDOMEN: CPT | Mod: 26 | Performed by: INTERNAL MEDICINE

## 2021-01-01 PROCEDURE — 36592 COLLECT BLOOD FROM PICC: CPT | Performed by: NURSE PRACTITIONER

## 2021-01-01 PROCEDURE — 85025 COMPLETE CBC W/AUTO DIFF WBC: CPT | Performed by: INTERNAL MEDICINE

## 2021-01-01 PROCEDURE — 250N000012 HC RX MED GY IP 250 OP 636 PS 637: Performed by: PHYSICIAN ASSISTANT

## 2021-01-01 PROCEDURE — 80053 COMPREHEN METABOLIC PANEL: CPT | Performed by: HOSPITALIST

## 2021-01-01 PROCEDURE — 85384 FIBRINOGEN ACTIVITY: CPT | Performed by: PHYSICIAN ASSISTANT

## 2021-01-01 PROCEDURE — 99238 HOSP IP/OBS DSCHRG MGMT 30/<: CPT | Performed by: INTERNAL MEDICINE

## 2021-01-01 PROCEDURE — 88112 CYTOPATH CELL ENHANCE TECH: CPT | Mod: TC | Performed by: INTERNAL MEDICINE

## 2021-01-01 PROCEDURE — 99292 CRITICAL CARE ADDL 30 MIN: CPT

## 2021-01-01 PROCEDURE — 87635 SARS-COV-2 COVID-19 AMP PRB: CPT | Performed by: NURSE PRACTITIONER

## 2021-01-01 PROCEDURE — 83735 ASSAY OF MAGNESIUM: CPT | Performed by: NURSE PRACTITIONER

## 2021-01-01 PROCEDURE — 85025 COMPLETE CBC W/AUTO DIFF WBC: CPT | Performed by: PHYSICIAN ASSISTANT

## 2021-01-01 PROCEDURE — 82945 GLUCOSE OTHER FLUID: CPT | Performed by: INTERNAL MEDICINE

## 2021-01-01 PROCEDURE — 82140 ASSAY OF AMMONIA: CPT | Performed by: HOSPITALIST

## 2021-01-01 PROCEDURE — 97530 THERAPEUTIC ACTIVITIES: CPT | Mod: GP

## 2021-01-01 PROCEDURE — 93010 ELECTROCARDIOGRAM REPORT: CPT | Performed by: INTERNAL MEDICINE

## 2021-01-01 PROCEDURE — U0003 INFECTIOUS AGENT DETECTION BY NUCLEIC ACID (DNA OR RNA); SEVERE ACUTE RESPIRATORY SYNDROME CORONAVIRUS 2 (SARS-COV-2) (CORONAVIRUS DISEASE [COVID-19]), AMPLIFIED PROBE TECHNIQUE, MAKING USE OF HIGH THROUGHPUT TECHNOLOGIES AS DESCRIBED BY CMS-2020-01-R: HCPCS | Performed by: INTERNAL MEDICINE

## 2021-01-01 PROCEDURE — 99239 HOSP IP/OBS DSCHRG MGMT >30: CPT | Performed by: INTERNAL MEDICINE

## 2021-01-01 PROCEDURE — 97535 SELF CARE MNGMENT TRAINING: CPT | Mod: GO | Performed by: OCCUPATIONAL THERAPIST

## 2021-01-01 PROCEDURE — 83605 ASSAY OF LACTIC ACID: CPT | Performed by: PHYSICIAN ASSISTANT

## 2021-01-01 PROCEDURE — 96365 THER/PROPH/DIAG IV INF INIT: CPT | Performed by: EMERGENCY MEDICINE

## 2021-01-01 PROCEDURE — 81001 URINALYSIS AUTO W/SCOPE: CPT | Performed by: NURSE PRACTITIONER

## 2021-01-01 PROCEDURE — 85027 COMPLETE CBC AUTOMATED: CPT | Performed by: PHYSICIAN ASSISTANT

## 2021-01-01 PROCEDURE — 85027 COMPLETE CBC AUTOMATED: CPT | Performed by: INTERNAL MEDICINE

## 2021-01-01 PROCEDURE — 84100 ASSAY OF PHOSPHORUS: CPT | Performed by: EMERGENCY MEDICINE

## 2021-01-01 PROCEDURE — 96366 THER/PROPH/DIAG IV INF ADDON: CPT | Performed by: EMERGENCY MEDICINE

## 2021-01-01 PROCEDURE — 88112 CYTOPATH CELL ENHANCE TECH: CPT | Mod: 26

## 2021-01-01 PROCEDURE — G0463 HOSPITAL OUTPT CLINIC VISIT: HCPCS

## 2021-01-01 PROCEDURE — 999N000065 XR CHEST 1 VW

## 2021-01-01 PROCEDURE — 82042 OTHER SOURCE ALBUMIN QUAN EA: CPT | Performed by: INTERNAL MEDICINE

## 2021-01-01 RX ORDER — DICYCLOMINE HCL 20 MG
20 TABLET ORAL 2 TIMES DAILY
Start: 2021-01-01

## 2021-01-01 RX ORDER — LIDOCAINE 40 MG/G
CREAM TOPICAL
Status: DISCONTINUED | OUTPATIENT
Start: 2021-01-01 | End: 2021-01-01

## 2021-01-01 RX ORDER — LACTULOSE 10 G/10G
10 SOLUTION ORAL 3 TIMES DAILY
Qty: 60 PACKET | Refills: 3 | Status: ON HOLD | OUTPATIENT
Start: 2021-01-01 | End: 2021-01-01

## 2021-01-01 RX ORDER — FUROSEMIDE 20 MG
20 TABLET ORAL DAILY
Status: ON HOLD | COMMUNITY
End: 2021-01-01

## 2021-01-01 RX ORDER — LACTULOSE 20 G/30ML
20 SOLUTION ORAL 3 TIMES DAILY
Qty: 946 ML | Refills: 0 | Status: SHIPPED | OUTPATIENT
Start: 2021-01-01

## 2021-01-01 RX ORDER — FUROSEMIDE 20 MG
20 TABLET ORAL DAILY
Status: DISCONTINUED | OUTPATIENT
Start: 2021-01-01 | End: 2021-01-01

## 2021-01-01 RX ORDER — AMOXICILLIN 250 MG
1 CAPSULE ORAL 2 TIMES DAILY
Status: DISCONTINUED | OUTPATIENT
Start: 2021-01-01 | End: 2021-01-01 | Stop reason: HOSPADM

## 2021-01-01 RX ORDER — AMOXICILLIN 250 MG
2 CAPSULE ORAL 2 TIMES DAILY PRN
Status: DISCONTINUED | OUTPATIENT
Start: 2021-01-01 | End: 2021-01-01 | Stop reason: HOSPADM

## 2021-01-01 RX ORDER — SPIRONOLACTONE 25 MG/1
100 TABLET ORAL DAILY
Status: DISCONTINUED | OUTPATIENT
Start: 2021-01-01 | End: 2021-01-01 | Stop reason: HOSPADM

## 2021-01-01 RX ORDER — SPIRONOLACTONE 25 MG/1
25 TABLET ORAL DAILY
Status: DISCONTINUED | OUTPATIENT
Start: 2021-01-01 | End: 2021-01-01

## 2021-01-01 RX ORDER — SIMETHICONE 80 MG
80 TABLET,CHEWABLE ORAL EVERY 6 HOURS PRN
Status: DISCONTINUED | OUTPATIENT
Start: 2021-01-01 | End: 2021-01-01 | Stop reason: HOSPADM

## 2021-01-01 RX ORDER — AMOXICILLIN 250 MG
1 CAPSULE ORAL 2 TIMES DAILY PRN
Status: DISCONTINUED | OUTPATIENT
Start: 2021-01-01 | End: 2021-01-01 | Stop reason: HOSPADM

## 2021-01-01 RX ORDER — ONDANSETRON 4 MG/1
4 TABLET, ORALLY DISINTEGRATING ORAL EVERY 6 HOURS PRN
Status: DISCONTINUED | OUTPATIENT
Start: 2021-01-01 | End: 2021-01-01 | Stop reason: HOSPADM

## 2021-01-01 RX ORDER — LIDOCAINE HYDROCHLORIDE 10 MG/ML
10 INJECTION, SOLUTION EPIDURAL; INFILTRATION; INTRACAUDAL; PERINEURAL ONCE
Status: COMPLETED | OUTPATIENT
Start: 2021-01-01 | End: 2021-01-01

## 2021-01-01 RX ORDER — AMOXICILLIN 250 MG
2 CAPSULE ORAL AT BEDTIME
Status: DISCONTINUED | OUTPATIENT
Start: 2021-01-01 | End: 2021-01-01 | Stop reason: HOSPADM

## 2021-01-01 RX ORDER — DEXTROSE MONOHYDRATE 25 G/50ML
25-50 INJECTION, SOLUTION INTRAVENOUS
Status: CANCELLED | OUTPATIENT
Start: 2021-01-01

## 2021-01-01 RX ORDER — FUROSEMIDE 20 MG
20 TABLET ORAL DAILY
Status: DISCONTINUED | OUTPATIENT
Start: 2021-01-01 | End: 2021-01-01 | Stop reason: HOSPADM

## 2021-01-01 RX ORDER — DEXTROSE MONOHYDRATE 25 G/50ML
25-50 INJECTION, SOLUTION INTRAVENOUS
Status: DISCONTINUED | OUTPATIENT
Start: 2021-01-01 | End: 2021-01-01 | Stop reason: HOSPADM

## 2021-01-01 RX ORDER — ATORVASTATIN CALCIUM 10 MG/1
20 TABLET, FILM COATED ORAL EVERY EVENING
Status: DISCONTINUED | OUTPATIENT
Start: 2021-01-01 | End: 2021-01-01 | Stop reason: HOSPADM

## 2021-01-01 RX ORDER — NICOTINE POLACRILEX 4 MG
15-30 LOZENGE BUCCAL
Status: DISCONTINUED | OUTPATIENT
Start: 2021-01-01 | End: 2021-01-01 | Stop reason: HOSPADM

## 2021-01-01 RX ORDER — AMOXICILLIN 250 MG
2 CAPSULE ORAL 2 TIMES DAILY
Status: DISCONTINUED | OUTPATIENT
Start: 2021-01-01 | End: 2021-01-01 | Stop reason: HOSPADM

## 2021-01-01 RX ORDER — PROCHLORPERAZINE MALEATE 5 MG
5 TABLET ORAL EVERY 6 HOURS PRN
Status: DISCONTINUED | OUTPATIENT
Start: 2021-01-01 | End: 2021-01-01 | Stop reason: HOSPADM

## 2021-01-01 RX ORDER — LACTULOSE 10 G/15ML
100 SOLUTION ORAL
Status: DISCONTINUED | OUTPATIENT
Start: 2021-01-01 | End: 2021-01-01 | Stop reason: HOSPADM

## 2021-01-01 RX ORDER — LIDOCAINE HYDROCHLORIDE 20 MG/ML
6 JELLY TOPICAL ONCE
Status: DISCONTINUED | OUTPATIENT
Start: 2021-01-01 | End: 2021-01-01 | Stop reason: HOSPADM

## 2021-01-01 RX ORDER — LACTULOSE 10 G/15ML
10 SOLUTION ORAL 3 TIMES DAILY
Status: DISCONTINUED | OUTPATIENT
Start: 2021-01-01 | End: 2021-01-01

## 2021-01-01 RX ORDER — IOPAMIDOL 755 MG/ML
500 INJECTION, SOLUTION INTRAVASCULAR ONCE
Status: COMPLETED | OUTPATIENT
Start: 2021-01-01 | End: 2021-01-01

## 2021-01-01 RX ORDER — LACTULOSE 10 G/10G
20 SOLUTION ORAL 3 TIMES DAILY
Qty: 60 PACKET | Refills: 3 | Status: ON HOLD | OUTPATIENT
Start: 2021-01-01 | End: 2021-01-01

## 2021-01-01 RX ORDER — SPIRONOLACTONE 100 MG/1
100 TABLET, FILM COATED ORAL DAILY
Status: DISCONTINUED | OUTPATIENT
Start: 2021-01-01 | End: 2021-01-01 | Stop reason: HOSPADM

## 2021-01-01 RX ORDER — CEFTRIAXONE 2 G/1
2 INJECTION, POWDER, FOR SOLUTION INTRAMUSCULAR; INTRAVENOUS ONCE
Status: COMPLETED | OUTPATIENT
Start: 2021-01-01 | End: 2021-01-01

## 2021-01-01 RX ORDER — LACTULOSE 10 G/15ML
20 SOLUTION ORAL ONCE
Status: COMPLETED | OUTPATIENT
Start: 2021-01-01 | End: 2021-01-01

## 2021-01-01 RX ORDER — ATORVASTATIN CALCIUM 20 MG/1
20 TABLET, FILM COATED ORAL EVERY MORNING
Status: DISCONTINUED | OUTPATIENT
Start: 2021-01-01 | End: 2021-01-01 | Stop reason: HOSPADM

## 2021-01-01 RX ORDER — CARVEDILOL 3.12 MG/1
3.12 TABLET ORAL 2 TIMES DAILY WITH MEALS
Status: DISCONTINUED | OUTPATIENT
Start: 2021-01-01 | End: 2021-01-01 | Stop reason: HOSPADM

## 2021-01-01 RX ORDER — LACTULOSE 10 G/10G
10 SOLUTION ORAL 2 TIMES DAILY
Qty: 60 PACKET | Refills: 1 | Status: ON HOLD | OUTPATIENT
Start: 2021-01-01 | End: 2021-01-01

## 2021-01-01 RX ORDER — POLYETHYLENE GLYCOL 3350 17 G/17G
17 POWDER, FOR SOLUTION ORAL DAILY PRN
Status: DISCONTINUED | OUTPATIENT
Start: 2021-01-01 | End: 2021-01-01 | Stop reason: HOSPADM

## 2021-01-01 RX ORDER — BISACODYL 10 MG
10 SUPPOSITORY, RECTAL RECTAL DAILY PRN
Status: DISCONTINUED | OUTPATIENT
Start: 2021-01-01 | End: 2021-01-01 | Stop reason: HOSPADM

## 2021-01-01 RX ORDER — AZITHROMYCIN 250 MG/1
250 TABLET, FILM COATED ORAL DAILY
Status: DISCONTINUED | OUTPATIENT
Start: 2021-01-01 | End: 2021-01-01

## 2021-01-01 RX ORDER — PROCHLORPERAZINE 25 MG
12.5 SUPPOSITORY, RECTAL RECTAL EVERY 12 HOURS PRN
Status: DISCONTINUED | OUTPATIENT
Start: 2021-01-01 | End: 2021-01-01 | Stop reason: HOSPADM

## 2021-01-01 RX ORDER — LIDOCAINE 40 MG/G
CREAM TOPICAL
Status: DISCONTINUED | OUTPATIENT
Start: 2021-01-01 | End: 2021-01-01 | Stop reason: HOSPADM

## 2021-01-01 RX ORDER — LACTULOSE 10 G/15ML
20 SOLUTION ORAL 3 TIMES DAILY
Status: DISCONTINUED | OUTPATIENT
Start: 2021-01-01 | End: 2021-01-01 | Stop reason: HOSPADM

## 2021-01-01 RX ORDER — DICYCLOMINE HCL 20 MG
20 TABLET ORAL 2 TIMES DAILY
Status: DISCONTINUED | OUTPATIENT
Start: 2021-01-01 | End: 2021-01-01 | Stop reason: HOSPADM

## 2021-01-01 RX ORDER — LACTULOSE 10 G/15ML
20-30 SOLUTION ORAL 3 TIMES DAILY
Status: DISCONTINUED | OUTPATIENT
Start: 2021-01-01 | End: 2021-01-01 | Stop reason: HOSPADM

## 2021-01-01 RX ORDER — LACTULOSE 10 G/10G
10 SOLUTION ORAL 2 TIMES DAILY
Status: DISCONTINUED | OUTPATIENT
Start: 2021-01-01 | End: 2021-01-01 | Stop reason: HOSPADM

## 2021-01-01 RX ORDER — CEFDINIR 300 MG/1
300 CAPSULE ORAL EVERY 12 HOURS
Qty: 3 CAPSULE | Refills: 0 | Status: SHIPPED | OUTPATIENT
Start: 2021-01-01 | End: 2021-01-01

## 2021-01-01 RX ORDER — POLYETHYLENE GLYCOL 3350 17 G/17G
17 POWDER, FOR SOLUTION ORAL ONCE
Status: COMPLETED | OUTPATIENT
Start: 2021-01-01 | End: 2021-01-01

## 2021-01-01 RX ORDER — AMOXICILLIN 250 MG
1 CAPSULE ORAL DAILY PRN
Qty: 30 TABLET | Refills: 0 | Status: SHIPPED | OUTPATIENT
Start: 2021-01-01

## 2021-01-01 RX ORDER — POLYETHYLENE GLYCOL 3350 17 G/17G
17 POWDER, FOR SOLUTION ORAL DAILY
Status: DISCONTINUED | OUTPATIENT
Start: 2021-01-01 | End: 2021-01-01 | Stop reason: HOSPADM

## 2021-01-01 RX ORDER — CEFTRIAXONE 2 G/1
2 INJECTION, POWDER, FOR SOLUTION INTRAMUSCULAR; INTRAVENOUS EVERY 24 HOURS
Status: DISCONTINUED | OUTPATIENT
Start: 2021-01-01 | End: 2021-01-01

## 2021-01-01 RX ORDER — ONDANSETRON 2 MG/ML
4 INJECTION INTRAMUSCULAR; INTRAVENOUS EVERY 6 HOURS PRN
Status: DISCONTINUED | OUTPATIENT
Start: 2021-01-01 | End: 2021-01-01 | Stop reason: HOSPADM

## 2021-01-01 RX ORDER — LACTULOSE 10 G/15ML
20 SOLUTION ORAL
Status: DISCONTINUED | OUTPATIENT
Start: 2021-01-01 | End: 2021-01-01 | Stop reason: HOSPADM

## 2021-01-01 RX ORDER — SPIRONOLACTONE 100 MG/1
100 TABLET, FILM COATED ORAL DAILY
COMMUNITY

## 2021-01-01 RX ORDER — FUROSEMIDE 40 MG
40 TABLET ORAL DAILY
COMMUNITY

## 2021-01-01 RX ORDER — DOCUSATE SODIUM 100 MG/1
100 CAPSULE, LIQUID FILLED ORAL 2 TIMES DAILY
Status: DISPENSED | OUTPATIENT
Start: 2021-01-01 | End: 2021-01-01

## 2021-01-01 RX ORDER — DOXYCYCLINE 100 MG/1
100 CAPSULE ORAL EVERY 12 HOURS SCHEDULED
Status: DISCONTINUED | OUTPATIENT
Start: 2021-01-01 | End: 2021-01-01

## 2021-01-01 RX ORDER — FUROSEMIDE 20 MG
40 TABLET ORAL DAILY
Status: DISCONTINUED | OUTPATIENT
Start: 2021-01-01 | End: 2021-01-01 | Stop reason: HOSPADM

## 2021-01-01 RX ORDER — ALBUMIN (HUMAN) 12.5 G/50ML
50 SOLUTION INTRAVENOUS ONCE
Status: COMPLETED | OUTPATIENT
Start: 2021-01-01 | End: 2021-01-01

## 2021-01-01 RX ORDER — FUROSEMIDE 40 MG
20 TABLET ORAL DAILY
COMMUNITY
End: 2021-01-01

## 2021-01-01 RX ORDER — CEFDINIR 300 MG/1
300 CAPSULE ORAL EVERY 12 HOURS SCHEDULED
Status: DISCONTINUED | OUTPATIENT
Start: 2021-01-01 | End: 2021-01-01 | Stop reason: HOSPADM

## 2021-01-01 RX ORDER — FERROUS SULFATE 325(65) MG
325 TABLET, DELAYED RELEASE (ENTERIC COATED) ORAL 2 TIMES DAILY
COMMUNITY

## 2021-01-01 RX ORDER — RIFAXIMIN 550 MG/1
550 TABLET ORAL 2 TIMES DAILY
COMMUNITY
Start: 2021-01-01

## 2021-01-01 RX ORDER — LIDOCAINE HYDROCHLORIDE 20 MG/ML
JELLY TOPICAL
Status: DISCONTINUED
Start: 2021-01-01 | End: 2021-01-01 | Stop reason: HOSPADM

## 2021-01-01 RX ORDER — LIDOCAINE HYDROCHLORIDE AND EPINEPHRINE 10; 10 MG/ML; UG/ML
INJECTION, SOLUTION INFILTRATION; PERINEURAL
Status: DISCONTINUED
Start: 2021-01-01 | End: 2021-01-01 | Stop reason: HOSPADM

## 2021-01-01 RX ORDER — SPIRONOLACTONE 50 MG/1
100 TABLET, FILM COATED ORAL DAILY
Status: DISCONTINUED | OUTPATIENT
Start: 2021-01-01 | End: 2021-01-01 | Stop reason: HOSPADM

## 2021-01-01 RX ORDER — NICOTINE POLACRILEX 4 MG
15-30 LOZENGE BUCCAL
Status: CANCELLED | OUTPATIENT
Start: 2021-01-01

## 2021-01-01 RX ADMIN — LACTULOSE 20 G: 10 SOLUTION ORAL at 08:31

## 2021-01-01 RX ADMIN — OMEPRAZOLE 20 MG: 20 CAPSULE, DELAYED RELEASE ORAL at 20:36

## 2021-01-01 RX ADMIN — LACTULOSE 20 G: 20 SOLUTION ORAL at 20:42

## 2021-01-01 RX ADMIN — FUROSEMIDE 20 MG: 20 TABLET ORAL at 09:04

## 2021-01-01 RX ADMIN — INSULIN GLARGINE 6 UNITS: 100 INJECTION, SOLUTION SUBCUTANEOUS at 07:55

## 2021-01-01 RX ADMIN — OMEPRAZOLE 20 MG: 20 CAPSULE, DELAYED RELEASE ORAL at 20:16

## 2021-01-01 RX ADMIN — FUROSEMIDE 20 MG: 20 TABLET ORAL at 08:33

## 2021-01-01 RX ADMIN — CARVEDILOL 3.12 MG: 3.12 TABLET, FILM COATED ORAL at 18:17

## 2021-01-01 RX ADMIN — ATORVASTATIN CALCIUM 20 MG: 20 TABLET, FILM COATED ORAL at 09:04

## 2021-01-01 RX ADMIN — ATORVASTATIN CALCIUM 20 MG: 10 TABLET, FILM COATED ORAL at 20:36

## 2021-01-01 RX ADMIN — FUROSEMIDE 20 MG: 20 TABLET ORAL at 08:18

## 2021-01-01 RX ADMIN — CEFTRIAXONE SODIUM 2 G: 2 INJECTION, POWDER, FOR SOLUTION INTRAMUSCULAR; INTRAVENOUS at 08:20

## 2021-01-01 RX ADMIN — LACTULOSE 10 G: 20 SOLUTION ORAL at 13:55

## 2021-01-01 RX ADMIN — CARVEDILOL 3.12 MG: 3.12 TABLET, FILM COATED ORAL at 19:31

## 2021-01-01 RX ADMIN — RIFAXIMIN 550 MG: 550 TABLET ORAL at 20:10

## 2021-01-01 RX ADMIN — OMEPRAZOLE 20 MG: 20 CAPSULE, DELAYED RELEASE ORAL at 08:14

## 2021-01-01 RX ADMIN — LACTULOSE 10 G: 10 POWDER, FOR SOLUTION ORAL at 08:14

## 2021-01-01 RX ADMIN — CEFDINIR 300 MG: 300 CAPSULE ORAL at 08:14

## 2021-01-01 RX ADMIN — ATORVASTATIN CALCIUM 20 MG: 20 TABLET, FILM COATED ORAL at 08:12

## 2021-01-01 RX ADMIN — SPIRONOLACTONE 100 MG: 25 TABLET ORAL at 08:17

## 2021-01-01 RX ADMIN — LACTULOSE 20 G: 20 SOLUTION ORAL at 07:59

## 2021-01-01 RX ADMIN — AZITHROMYCIN MONOHYDRATE 250 MG: 250 TABLET ORAL at 08:18

## 2021-01-01 RX ADMIN — FUROSEMIDE 20 MG: 20 TABLET ORAL at 08:14

## 2021-01-01 RX ADMIN — SPIRONOLACTONE 100 MG: 25 TABLET ORAL at 09:31

## 2021-01-01 RX ADMIN — RIFAXIMIN 550 MG: 550 TABLET ORAL at 20:07

## 2021-01-01 RX ADMIN — INSULIN ASPART 2 UNITS: 100 INJECTION, SOLUTION INTRAVENOUS; SUBCUTANEOUS at 12:33

## 2021-01-01 RX ADMIN — OMEPRAZOLE 20 MG: 20 CAPSULE, DELAYED RELEASE ORAL at 20:28

## 2021-01-01 RX ADMIN — SODIUM CHLORIDE 1000 ML: 9 INJECTION, SOLUTION INTRAVENOUS at 06:44

## 2021-01-01 RX ADMIN — RIFAXIMIN 550 MG: 550 TABLET ORAL at 20:36

## 2021-01-01 RX ADMIN — IOPAMIDOL 80 ML: 755 INJECTION, SOLUTION INTRAVENOUS at 14:36

## 2021-01-01 RX ADMIN — POLYETHYLENE GLYCOL 3350 17 G: 17 POWDER, FOR SOLUTION ORAL at 09:14

## 2021-01-01 RX ADMIN — OMEPRAZOLE 20 MG: 20 CAPSULE, DELAYED RELEASE ORAL at 09:18

## 2021-01-01 RX ADMIN — LACTULOSE 10 G: 20 SOLUTION ORAL at 08:12

## 2021-01-01 RX ADMIN — POLYETHYLENE GLYCOL 3350 17 G: 17 POWDER, FOR SOLUTION ORAL at 09:06

## 2021-01-01 RX ADMIN — SPIRONOLACTONE 25 MG: 25 TABLET ORAL at 15:07

## 2021-01-01 RX ADMIN — FUROSEMIDE 40 MG: 20 TABLET ORAL at 09:18

## 2021-01-01 RX ADMIN — RIFAXIMIN 550 MG: 550 TABLET ORAL at 08:33

## 2021-01-01 RX ADMIN — LACTULOSE 10 G: 10 POWDER, FOR SOLUTION ORAL at 09:29

## 2021-01-01 RX ADMIN — INSULIN GLARGINE 6 UNITS: 100 INJECTION, SOLUTION SUBCUTANEOUS at 08:13

## 2021-01-01 RX ADMIN — CARVEDILOL 3.12 MG: 3.12 TABLET, FILM COATED ORAL at 20:16

## 2021-01-01 RX ADMIN — CARVEDILOL 3.12 MG: 3.12 TABLET, FILM COATED ORAL at 18:03

## 2021-01-01 RX ADMIN — RIFAXIMIN 550 MG: 550 TABLET ORAL at 13:30

## 2021-01-01 RX ADMIN — FUROSEMIDE 20 MG: 20 TABLET ORAL at 17:40

## 2021-01-01 RX ADMIN — CARVEDILOL 3.12 MG: 3.12 TABLET, FILM COATED ORAL at 17:38

## 2021-01-01 RX ADMIN — ALBUMIN HUMAN 50 G: 0.25 SOLUTION INTRAVENOUS at 21:36

## 2021-01-01 RX ADMIN — CARVEDILOL 3.12 MG: 3.12 TABLET, FILM COATED ORAL at 08:12

## 2021-01-01 RX ADMIN — INSULIN GLARGINE 6 UNITS: 100 INJECTION, SOLUTION SUBCUTANEOUS at 08:33

## 2021-01-01 RX ADMIN — FUROSEMIDE 20 MG: 20 TABLET ORAL at 08:29

## 2021-01-01 RX ADMIN — LACTULOSE 20 G: 20 SOLUTION ORAL at 08:28

## 2021-01-01 RX ADMIN — CARVEDILOL 3.12 MG: 3.12 TABLET, FILM COATED ORAL at 17:40

## 2021-01-01 RX ADMIN — LACTULOSE 20 G: 20 SOLUTION ORAL at 14:54

## 2021-01-01 RX ADMIN — ATORVASTATIN CALCIUM 20 MG: 20 TABLET, FILM COATED ORAL at 08:17

## 2021-01-01 RX ADMIN — CEFTRIAXONE SODIUM 2 G: 2 INJECTION, POWDER, FOR SOLUTION INTRAMUSCULAR; INTRAVENOUS at 07:59

## 2021-01-01 RX ADMIN — LACTULOSE 10 G: 20 SOLUTION ORAL at 13:10

## 2021-01-01 RX ADMIN — INSULIN GLARGINE 6 UNITS: 100 INJECTION, SOLUTION SUBCUTANEOUS at 08:39

## 2021-01-01 RX ADMIN — DOCUSATE SODIUM 286 ML: 50 LIQUID ORAL at 23:32

## 2021-01-01 RX ADMIN — OMEPRAZOLE 20 MG: 20 CAPSULE, DELAYED RELEASE ORAL at 20:12

## 2021-01-01 RX ADMIN — CARVEDILOL 3.12 MG: 3.12 TABLET, FILM COATED ORAL at 08:18

## 2021-01-01 RX ADMIN — LACTULOSE 20 G: 20 POWDER, FOR SOLUTION ORAL at 08:15

## 2021-01-01 RX ADMIN — ATORVASTATIN CALCIUM 20 MG: 20 TABLET, FILM COATED ORAL at 07:57

## 2021-01-01 RX ADMIN — RIFAXIMIN 550 MG: 550 TABLET ORAL at 07:57

## 2021-01-01 RX ADMIN — OMEPRAZOLE 20 MG: 20 CAPSULE, DELAYED RELEASE ORAL at 06:53

## 2021-01-01 RX ADMIN — DOCUSATE SODIUM 50 MG AND SENNOSIDES 8.6 MG 1 TABLET: 8.6; 5 TABLET, FILM COATED ORAL at 20:16

## 2021-01-01 RX ADMIN — OMEPRAZOLE 20 MG: 20 CAPSULE, DELAYED RELEASE ORAL at 08:29

## 2021-01-01 RX ADMIN — RIFAXIMIN 550 MG: 550 TABLET ORAL at 20:28

## 2021-01-01 RX ADMIN — INSULIN ASPART 1 UNITS: 100 INJECTION, SOLUTION INTRAVENOUS; SUBCUTANEOUS at 12:20

## 2021-01-01 RX ADMIN — INSULIN ASPART 1 UNITS: 100 INJECTION, SOLUTION INTRAVENOUS; SUBCUTANEOUS at 17:35

## 2021-01-01 RX ADMIN — SPIRONOLACTONE 100 MG: 25 TABLET ORAL at 08:33

## 2021-01-01 RX ADMIN — LACTULOSE 20 G: 20 SOLUTION ORAL at 14:02

## 2021-01-01 RX ADMIN — LACTULOSE 10 G: 10 POWDER, FOR SOLUTION ORAL at 19:45

## 2021-01-01 RX ADMIN — LACTULOSE 10 G: 20 SOLUTION ORAL at 08:31

## 2021-01-01 RX ADMIN — DOCUSATE SODIUM AND SENNOSIDES 1 TABLET: 8.6; 5 TABLET, FILM COATED ORAL at 08:12

## 2021-01-01 RX ADMIN — INSULIN ASPART 1 UNITS: 100 INJECTION, SOLUTION INTRAVENOUS; SUBCUTANEOUS at 13:31

## 2021-01-01 RX ADMIN — SPIRONOLACTONE 100 MG: 25 TABLET ORAL at 08:29

## 2021-01-01 RX ADMIN — OMEPRAZOLE 20 MG: 20 CAPSULE, DELAYED RELEASE ORAL at 19:42

## 2021-01-01 RX ADMIN — ATORVASTATIN CALCIUM 20 MG: 20 TABLET, FILM COATED ORAL at 08:29

## 2021-01-01 RX ADMIN — CARVEDILOL 3.12 MG: 3.12 TABLET, FILM COATED ORAL at 09:18

## 2021-01-01 RX ADMIN — LACTULOSE 20 G: 20 SOLUTION ORAL at 09:14

## 2021-01-01 RX ADMIN — CARVEDILOL 3.12 MG: 3.12 TABLET, FILM COATED ORAL at 09:04

## 2021-01-01 RX ADMIN — ATORVASTATIN CALCIUM 20 MG: 20 TABLET, FILM COATED ORAL at 08:31

## 2021-01-01 RX ADMIN — OMEPRAZOLE 20 MG: 20 CAPSULE, DELAYED RELEASE ORAL at 09:04

## 2021-01-01 RX ADMIN — FUROSEMIDE 20 MG: 20 TABLET ORAL at 07:57

## 2021-01-01 RX ADMIN — DOCUSATE SODIUM AND SENNOSIDES 2 TABLET: 8.6; 5 TABLET, FILM COATED ORAL at 19:42

## 2021-01-01 RX ADMIN — LACTULOSE 20 G: 20 SOLUTION ORAL at 14:43

## 2021-01-01 RX ADMIN — SPIRONOLACTONE 100 MG: 25 TABLET ORAL at 08:36

## 2021-01-01 RX ADMIN — RIFAXIMIN 550 MG: 550 TABLET ORAL at 21:29

## 2021-01-01 RX ADMIN — LACTULOSE 10 G: 20 SOLUTION ORAL at 20:12

## 2021-01-01 RX ADMIN — AZITHROMYCIN MONOHYDRATE 250 MG: 250 TABLET ORAL at 08:17

## 2021-01-01 RX ADMIN — OMEPRAZOLE 20 MG: 20 CAPSULE, DELAYED RELEASE ORAL at 08:12

## 2021-01-01 RX ADMIN — DOCUSATE SODIUM AND SENNOSIDES 1 TABLET: 8.6; 5 TABLET, FILM COATED ORAL at 20:07

## 2021-01-01 RX ADMIN — ATORVASTATIN CALCIUM 20 MG: 20 TABLET, FILM COATED ORAL at 08:14

## 2021-01-01 RX ADMIN — LACTULOSE 20 G: 10 SOLUTION ORAL at 20:36

## 2021-01-01 RX ADMIN — SPIRONOLACTONE 100 MG: 25 TABLET ORAL at 17:40

## 2021-01-01 RX ADMIN — LACTULOSE 20 G: 20 SOLUTION ORAL at 21:30

## 2021-01-01 RX ADMIN — LACTULOSE 20 G: 20 POWDER, FOR SOLUTION ORAL at 20:16

## 2021-01-01 RX ADMIN — FUROSEMIDE 40 MG: 20 TABLET ORAL at 08:32

## 2021-01-01 RX ADMIN — SPIRONOLACTONE 100 MG: 100 TABLET ORAL at 08:32

## 2021-01-01 RX ADMIN — OMEPRAZOLE 20 MG: 20 CAPSULE, DELAYED RELEASE ORAL at 19:45

## 2021-01-01 RX ADMIN — CARVEDILOL 3.12 MG: 3.12 TABLET, FILM COATED ORAL at 17:34

## 2021-01-01 RX ADMIN — CARVEDILOL 3.12 MG: 3.12 TABLET, FILM COATED ORAL at 18:07

## 2021-01-01 RX ADMIN — CARVEDILOL 3.12 MG: 3.12 TABLET, FILM COATED ORAL at 08:33

## 2021-01-01 RX ADMIN — RIFAXIMIN 200 MG: 200 TABLET ORAL at 09:32

## 2021-01-01 RX ADMIN — AZITHROMYCIN MONOHYDRATE 500 MG: 500 INJECTION, POWDER, LYOPHILIZED, FOR SOLUTION INTRAVENOUS at 08:49

## 2021-01-01 RX ADMIN — LACTULOSE 20 G: 20 SOLUTION ORAL at 14:09

## 2021-01-01 RX ADMIN — INSULIN ASPART 1 UNITS: 100 INJECTION, SOLUTION INTRAVENOUS; SUBCUTANEOUS at 11:47

## 2021-01-01 RX ADMIN — OMEPRAZOLE 20 MG: 20 CAPSULE, DELAYED RELEASE ORAL at 08:33

## 2021-01-01 RX ADMIN — OMEPRAZOLE 20 MG: 20 CAPSULE, DELAYED RELEASE ORAL at 08:30

## 2021-01-01 RX ADMIN — OMEPRAZOLE 20 MG: 20 CAPSULE, DELAYED RELEASE ORAL at 08:18

## 2021-01-01 RX ADMIN — RIFAXIMIN 550 MG: 550 TABLET ORAL at 08:29

## 2021-01-01 RX ADMIN — LACTULOSE 20 G: 20 SOLUTION ORAL at 19:42

## 2021-01-01 RX ADMIN — DOCUSATE SODIUM AND SENNOSIDES 2 TABLET: 8.6; 5 TABLET, FILM COATED ORAL at 07:57

## 2021-01-01 RX ADMIN — SPIRONOLACTONE 100 MG: 25 TABLET ORAL at 07:56

## 2021-01-01 RX ADMIN — LACTULOSE 10 G: 20 SOLUTION ORAL at 20:06

## 2021-01-01 RX ADMIN — OMEPRAZOLE 20 MG: 20 CAPSULE, DELAYED RELEASE ORAL at 20:07

## 2021-01-01 RX ADMIN — LACTULOSE 20 G: 20 SOLUTION ORAL at 20:10

## 2021-01-01 RX ADMIN — LACTULOSE 20 G: 20 SOLUTION ORAL at 13:12

## 2021-01-01 RX ADMIN — DOCUSATE SODIUM AND SENNOSIDES 1 TABLET: 8.6; 5 TABLET, FILM COATED ORAL at 08:33

## 2021-01-01 RX ADMIN — ATORVASTATIN CALCIUM 20 MG: 20 TABLET, FILM COATED ORAL at 08:19

## 2021-01-01 RX ADMIN — DOCUSATE SODIUM 100 MG: 100 CAPSULE, LIQUID FILLED ORAL at 09:06

## 2021-01-01 RX ADMIN — SPIRONOLACTONE 100 MG: 100 TABLET ORAL at 09:18

## 2021-01-01 RX ADMIN — LIDOCAINE HYDROCHLORIDE 10 ML: 10 INJECTION, SOLUTION EPIDURAL; INFILTRATION; INTRACAUDAL; PERINEURAL at 11:08

## 2021-01-01 RX ADMIN — OMEPRAZOLE 20 MG: 20 CAPSULE, DELAYED RELEASE ORAL at 21:29

## 2021-01-01 RX ADMIN — DOCUSATE SODIUM AND SENNOSIDES 1 TABLET: 8.6; 5 TABLET, FILM COATED ORAL at 20:29

## 2021-01-01 RX ADMIN — LACTULOSE 20 G: 10 SOLUTION ORAL at 15:07

## 2021-01-01 RX ADMIN — SODIUM CHLORIDE 1000 ML: 9 INJECTION, SOLUTION INTRAVENOUS at 21:08

## 2021-01-01 RX ADMIN — SODIUM CHLORIDE 1000 ML: 9 INJECTION, SOLUTION INTRAVENOUS at 00:38

## 2021-01-01 RX ADMIN — DOCUSATE SODIUM 100 MG: 100 CAPSULE, LIQUID FILLED ORAL at 08:32

## 2021-01-01 RX ADMIN — FUROSEMIDE 20 MG: 20 TABLET ORAL at 09:32

## 2021-01-01 RX ADMIN — FUROSEMIDE 20 MG: 20 TABLET ORAL at 08:12

## 2021-01-01 RX ADMIN — RIFAXIMIN 550 MG: 550 TABLET ORAL at 08:30

## 2021-01-01 RX ADMIN — CEFDINIR 300 MG: 300 CAPSULE ORAL at 19:45

## 2021-01-01 RX ADMIN — SPIRONOLACTONE 100 MG: 25 TABLET ORAL at 09:04

## 2021-01-01 RX ADMIN — SENNOSIDES AND DOCUSATE SODIUM 2 TABLET: 8.6; 5 TABLET ORAL at 00:47

## 2021-01-01 RX ADMIN — LIDOCAINE HYDROCHLORIDE 10 ML: 10 INJECTION, SOLUTION EPIDURAL; INFILTRATION; INTRACAUDAL; PERINEURAL at 14:47

## 2021-01-01 RX ADMIN — LACTULOSE 20 G: 10 SOLUTION ORAL at 09:23

## 2021-01-01 RX ADMIN — SODIUM CHLORIDE 1000 ML: 9 INJECTION, SOLUTION INTRAVENOUS at 10:16

## 2021-01-01 RX ADMIN — CARVEDILOL 3.12 MG: 3.12 TABLET, FILM COATED ORAL at 07:55

## 2021-01-01 RX ADMIN — LIDOCAINE HYDROCHLORIDE 10 ML: 10 INJECTION, SOLUTION EPIDURAL; INFILTRATION; INTRACAUDAL; PERINEURAL at 15:41

## 2021-01-01 RX ADMIN — RIFAXIMIN 200 MG: 200 TABLET ORAL at 20:43

## 2021-01-01 RX ADMIN — OMEPRAZOLE 20 MG: 20 CAPSULE, DELAYED RELEASE ORAL at 18:06

## 2021-01-01 RX ADMIN — INSULIN GLARGINE 6 UNITS: 100 INJECTION, SOLUTION SUBCUTANEOUS at 09:29

## 2021-01-01 RX ADMIN — OMEPRAZOLE 20 MG: 20 CAPSULE, DELAYED RELEASE ORAL at 19:31

## 2021-01-01 RX ADMIN — LACTULOSE 10 G: 20 SOLUTION ORAL at 22:13

## 2021-01-01 RX ADMIN — SODIUM CHLORIDE 65 ML: 9 INJECTION, SOLUTION INTRAVENOUS at 14:36

## 2021-01-01 RX ADMIN — LACTULOSE 20 G: 20 SOLUTION ORAL at 07:55

## 2021-01-01 RX ADMIN — RIFAXIMIN 550 MG: 550 TABLET ORAL at 09:04

## 2021-01-01 RX ADMIN — RIFAXIMIN 550 MG: 550 TABLET ORAL at 19:42

## 2021-01-01 RX ADMIN — POLYETHYLENE GLYCOL 3350 17 G: 17 POWDER, FOR SOLUTION ORAL at 09:23

## 2021-01-01 RX ADMIN — CARVEDILOL 3.12 MG: 3.12 TABLET, FILM COATED ORAL at 17:25

## 2021-01-01 RX ADMIN — CARVEDILOL 3.12 MG: 3.12 TABLET, FILM COATED ORAL at 08:32

## 2021-01-01 RX ADMIN — OMEPRAZOLE 20 MG: 20 CAPSULE, DELAYED RELEASE ORAL at 08:31

## 2021-01-01 RX ADMIN — INSULIN ASPART 1 UNITS: 100 INJECTION, SOLUTION INTRAVENOUS; SUBCUTANEOUS at 17:27

## 2021-01-01 RX ADMIN — LACTULOSE 20 G: 20 SOLUTION ORAL at 11:50

## 2021-01-01 RX ADMIN — CARVEDILOL 3.12 MG: 3.12 TABLET, FILM COATED ORAL at 09:32

## 2021-01-01 RX ADMIN — POLYETHYLENE GLYCOL 3350 17 G: 17 POWDER, FOR SOLUTION ORAL at 08:32

## 2021-01-01 RX ADMIN — CARVEDILOL 3.12 MG: 3.12 TABLET, FILM COATED ORAL at 18:08

## 2021-01-01 RX ADMIN — SPIRONOLACTONE 100 MG: 25 TABLET ORAL at 08:14

## 2021-01-01 RX ADMIN — LACTULOSE 20 G: 20 SOLUTION ORAL at 09:32

## 2021-01-01 RX ADMIN — OMEPRAZOLE 20 MG: 20 CAPSULE, DELAYED RELEASE ORAL at 20:10

## 2021-01-01 RX ADMIN — CARVEDILOL 3.12 MG: 3.12 TABLET, FILM COATED ORAL at 08:14

## 2021-01-01 RX ADMIN — ATORVASTATIN CALCIUM 20 MG: 20 TABLET, FILM COATED ORAL at 08:33

## 2021-01-01 RX ADMIN — OMEPRAZOLE 20 MG: 20 CAPSULE, DELAYED RELEASE ORAL at 07:57

## 2021-01-01 RX ADMIN — CARVEDILOL 3.12 MG: 3.12 TABLET, FILM COATED ORAL at 08:17

## 2021-01-01 RX ADMIN — RIFAXIMIN 550 MG: 550 TABLET ORAL at 08:32

## 2021-01-01 RX ADMIN — LIDOCAINE HYDROCHLORIDE 10 ML: 10 INJECTION, SOLUTION EPIDURAL; INFILTRATION; INTRACAUDAL; PERINEURAL at 13:11

## 2021-01-01 RX ADMIN — LACTULOSE 20 G: 20 SOLUTION ORAL at 08:32

## 2021-01-01 RX ADMIN — CARVEDILOL 3.12 MG: 3.12 TABLET, FILM COATED ORAL at 08:30

## 2021-01-01 RX ADMIN — LACTULOSE 10 G: 20 SOLUTION ORAL at 17:41

## 2021-01-01 RX ADMIN — RIFAXIMIN 550 MG: 550 TABLET ORAL at 20:12

## 2021-01-01 RX ADMIN — CARVEDILOL 3.12 MG: 3.12 TABLET, FILM COATED ORAL at 18:23

## 2021-01-01 RX ADMIN — SPIRONOLACTONE 100 MG: 25 TABLET ORAL at 08:12

## 2021-01-01 RX ADMIN — DOCUSATE SODIUM AND SENNOSIDES 2 TABLET: 8.6; 5 TABLET, FILM COATED ORAL at 08:29

## 2021-01-01 RX ADMIN — RIFAXIMIN 550 MG: 550 TABLET ORAL at 08:12

## 2021-01-01 ASSESSMENT — PAIN SCALES - GENERAL
PAINLEVEL: NO PAIN (0)
PAINLEVEL: NO PAIN (0)

## 2021-01-01 ASSESSMENT — ACTIVITIES OF DAILY LIVING (ADL)
DEPENDENT_IADLS:: CLEANING;COOKING;LAUNDRY;MEAL PREPARATION;MEDICATION MANAGEMENT;TRANSPORTATION
ADLS_ACUITY_SCORE: 14
ADLS_ACUITY_SCORE: 13
WHICH_OF_THE_ABOVE_FUNCTIONAL_RISKS_HAD_A_RECENT_ONSET_OR_CHANGE?: FALL HISTORY
ADLS_ACUITY_SCORE: 14
ADLS_ACUITY_SCORE: 14
ADLS_ACUITY_SCORE: 13
ADLS_ACUITY_SCORE: 13
TOILETING_ISSUES: NO
HEARING_DIFFICULTY_OR_DEAF: NO
DRESSING/BATHING_DIFFICULTY: NO
ADLS_ACUITY_SCORE: 14
ADLS_ACUITY_SCORE: 13
DRESSING/BATHING_DIFFICULTY: NO
ADLS_ACUITY_SCORE: 14
FALL_HISTORY_WITHIN_LAST_SIX_MONTHS: NO
WALKING_OR_CLIMBING_STAIRS_DIFFICULTY: NO
ADLS_ACUITY_SCORE: 15
ADLS_ACUITY_SCORE: 12
ADLS_ACUITY_SCORE: 14
ADLS_ACUITY_SCORE: 14
ADLS_ACUITY_SCORE: 16
ADLS_ACUITY_SCORE: 12
TOILETING_ISSUES: NO
ADLS_ACUITY_SCORE: 12
ADLS_ACUITY_SCORE: 14
ADLS_ACUITY_SCORE: 12
HEARING_DIFFICULTY_OR_DEAF: NO
ADLS_ACUITY_SCORE: 14
ADLS_ACUITY_SCORE: 13
ADLS_ACUITY_SCORE: 14
DIFFICULTY_EATING/SWALLOWING: NO
DRESSING/BATHING_DIFFICULTY: NO
ADLS_ACUITY_SCORE: 12
WEAR_GLASSES_OR_BLIND: YES
ADLS_ACUITY_SCORE: 14
ADLS_ACUITY_SCORE: 12
ADLS_ACUITY_SCORE: 14
ADLS_ACUITY_SCORE: 13
TOILETING_ISSUES: NO
FALL_HISTORY_WITHIN_LAST_SIX_MONTHS: YES
ADLS_ACUITY_SCORE: 12
WHICH_OF_THE_ABOVE_FUNCTIONAL_RISKS_HAD_A_RECENT_ONSET_OR_CHANGE?: FALL HISTORY
CONCENTRATING,_REMEMBERING_OR_MAKING_DECISIONS_DIFFICULTY: YES
ADLS_ACUITY_SCORE: 12
ADLS_ACUITY_SCORE: 12
DIFFICULTY_EATING/SWALLOWING: NO
WEAR_GLASSES_OR_BLIND: YES
ADLS_ACUITY_SCORE: 13
ADLS_ACUITY_SCORE: 13
HEARING_DIFFICULTY_OR_DEAF: NO
ADLS_ACUITY_SCORE: 15
ADLS_ACUITY_SCORE: 14
ADLS_ACUITY_SCORE: 12
WALKING_OR_CLIMBING_STAIRS_DIFFICULTY: NO
WALKING_OR_CLIMBING_STAIRS_DIFFICULTY: NO
ADLS_ACUITY_SCORE: 13
ADLS_ACUITY_SCORE: 12
NUMBER_OF_TIMES_PATIENT_HAS_FALLEN_WITHIN_LAST_SIX_MONTHS: 4
DIFFICULTY_COMMUNICATING: NO
DOING_ERRANDS_INDEPENDENTLY_DIFFICULTY: YES
CONCENTRATING,_REMEMBERING_OR_MAKING_DECISIONS_DIFFICULTY: YES
ADLS_ACUITY_SCORE: 14
ADLS_ACUITY_SCORE: 14
DIFFICULTY_COMMUNICATING: NO
ADLS_ACUITY_SCORE: 14
WEAR_GLASSES_OR_BLIND: YES
ADLS_ACUITY_SCORE: 14
FALL_HISTORY_WITHIN_LAST_SIX_MONTHS: YES
DOING_ERRANDS_INDEPENDENTLY_DIFFICULTY: NO
ADLS_ACUITY_SCORE: 12
ADLS_ACUITY_SCORE: 13
ADLS_ACUITY_SCORE: 14
NUMBER_OF_TIMES_PATIENT_HAS_FALLEN_WITHIN_LAST_SIX_MONTHS: 2
ADLS_ACUITY_SCORE: 14
ADLS_ACUITY_SCORE: 13
ADLS_ACUITY_SCORE: 12
ADLS_ACUITY_SCORE: 14
VISION_MANAGEMENT: GLASSES
DOING_ERRANDS_INDEPENDENTLY_DIFFICULTY: NO

## 2021-01-01 ASSESSMENT — ENCOUNTER SYMPTOMS
NAUSEA: 0
DIARRHEA: 0
FEVER: 0
FEVER: 0
DIFFICULTY URINATING: 0
VOMITING: 0
ABDOMINAL PAIN: 0
BLOOD IN STOOL: 1
CONSTIPATION: 1
VOMITING: 0
FEVER: 0
ABDOMINAL DISTENTION: 1
APPETITE CHANGE: 0
DIARRHEA: 0
CHILLS: 0
ABDOMINAL PAIN: 1
COUGH: 0
ABDOMINAL PAIN: 1
SORE THROAT: 0
COUGH: 0
FREQUENCY: 0
FEVER: 0
FEVER: 0
CONFUSION: 1
RHINORRHEA: 0
VOMITING: 0
COUGH: 0
SHORTNESS OF BREATH: 1
VOMITING: 0
SHORTNESS OF BREATH: 1
CHILLS: 0
DYSURIA: 0
COUGH: 0
COUGH: 0
FATIGUE: 1
CONSTIPATION: 0
CONFUSION: 1
DIZZINESS: 1
DIARRHEA: 0
BLOOD IN STOOL: 0
FREQUENCY: 1
SHORTNESS OF BREATH: 0
SHORTNESS OF BREATH: 1
HEMATURIA: 0
DIARRHEA: 0
ABDOMINAL PAIN: 0

## 2021-01-01 ASSESSMENT — MIFFLIN-ST. JEOR
SCORE: 1439.34
SCORE: 1473.36
SCORE: 1552.29
SCORE: 1566.8
SCORE: 1444.33
SCORE: 1541.86

## 2021-01-05 NOTE — ED NOTES
Ambulated Pt on RA with pulse Ox. Noted O2 sat at 88%-90% with reported increased SOB. Pt put 2LPM NC after ambulation for comfort. O2 sat maintaining 95% and greater. MD aware. Will continue to monitor and assess.

## 2021-01-05 NOTE — ED PROVIDER NOTES
History   Chief Complaint:  Shortness of Breath     HPI   Ramirez Leslie is a 75 year old male with history of hypertension, diabetes, and atrial fibrillation who presents with shortness of breath. The patient reports 6-8 months ago he developed shortness of breath secondary to pleural effusion. He presents today with concerns for a recurrent pleural effusion because he has become more short of breath. He was taking Lasix for 30 days but stopped because his prescription ran out and there were no refills. He endorses leg swelling worse on the left. He denies chills, fever, cough, chest pain, and any known COVID exposure. Of note, the patient's last 2 PCPs quit so he is looking for a new doctor.     Per chart review, the patient was admitted to the hospital on 10/29/20 for a recurrent right pleural effusion. He presents with 1 week of progressively worsening shortness of breath. At this time, he underwent a XR chest (see below), lab work, and was treated with IV Lasix. Ultimately, the patient was admitted with diuresis and plans for a thoracentesis.     XR Chest Port 1 View (10/29/20)  Moderate pleural effusion on the right with associated  compressive atelectasis and/or infiltrate. Left lung clear.  Reading per radiology.    Review of Systems   Constitutional: Negative for chills and fever.   Respiratory: Positive for shortness of breath. Negative for cough.    Cardiovascular: Positive for leg swelling. Negative for chest pain.   Gastrointestinal: Negative for abdominal pain, diarrhea and vomiting.   All other systems reviewed and are negative.    Allergies:  No known drug allergies     Medications:  Lipitor   Coreg  Bentyl   Lasix   Insulin glargine   Metformin  Omeprazole   Spironolactone   Hygroton   Lomotil     Past Medical History:    Cirrhosis    Hypertension   Nephrolithiasis   Subdural hematoma    Type II diabetes mellitus   Atrial fibrillation  Pancytopenia   Recurrent right pleural effusion   SBP  Acute  hypoxemic respiratory failure   Malignant neoplasm of prostate     Past Surgical History:    Appendectomy   HC lap, orchiectomy   Lithotripsy x2  Prostatectomy   Knee arthroscopy     Family History:    Heart disease    Social History:  Patient presents unaccompanied to the ED.     Physical Exam     Patient Vitals for the past 24 hrs:   BP Temp Temp src Pulse Resp SpO2 Weight   01/05/21 1701 -- -- -- -- -- 94 % --   01/05/21 1653 -- -- -- -- -- 98 % --   01/05/21 1645 -- -- -- 64 22 94 % --   01/05/21 1515 (!) 159/79 -- -- 59 -- 98 % --   01/05/21 1411 (!) 141/78 97.5  F (36.4  C) Temporal 72 20 93 % 85.2 kg (187 lb 13.3 oz)     Physical Exam  Constitutional: Well appearing.  HEENT: Atraumatic. Moist mucous membranes.  Neck: Soft.  Supple.  No JVD.  Cardiac: Regular rate and rhythm.  No murmur or rub.  Respiratory: Diminished breath sounds throughout the mid and lower right lung fields.  No respiratory distress.  No wheezing, rhonchi, or rales.  Abdomen: Soft and nontender.   Nondistended.  Musculoskeletal: No edema.  Normal range of motion.  Neurologic: Alert and oriented x3.  Normal tone and bulk.   Normal gait.  Skin: No rashes.  No edema.  Psych: Normal affect.  Normal behavior.    Emergency Department Course     ECG (14:18:58):  Rate 62 bpm. TN interval 206. QRS duration 110. QT/QTc 438/444. P-R-T axes 5 43 26. Normal sinus rhythm. Incomplete Left bundle branch block. Borderline ECG. Interpreted at 1450 by Bronson Young MD.     Imaging:  XR Chest Port 1 View:  Recurrent moderate to large right pleural effusion.  Underlying compressive atelectasis in the right lung base.  As read by Radiology.     US Thoracentesis  Ultrasound-guided right thoracentesis. 2.2 L removed.  As read by Radiology.     XR Chest 1 View:  Small residual right pleural effusion, status post  interval thoracentesis. No pneumothorax. Improved aeration right lung  base.  As read by Radiology.     Laboratory:  CBC: WBC 1.8 (L), RBC Count  3.59 (L), HGB 10.5 (L), Hematocrit 33.3 (L), RDW 18.1 (H), PLT 46 (LL), Absolute Neutrophil 1.1 (L), Absolute Lymphocyte 0.4 (L), o/w WNL   CMP: Glucose 110 (H), Chloride: 115 (H), Calcium: 8.4 (L), Albumin: 2.5 (L), Protein Total: 6.7 (L), o/w WNL (Creatinine: 0.81)    Nt probnp inpatient (BNP): 344   INR: 1.47 (H)      Symptomatic Influenza A/B & SARS-CoV2 (COVID-19) Virus PCR Multiplex: negative     Emergency Department Course:  Reviewed:  1435: I reviewed the patient's nursing notes, vitals, past medical records, and Care Everywhere.     Assessments:  1440: I performed an exam of the patient as documented above.     1700: I rechecked the patient and discussed the ED workup thus far.      Consults:   1516: I spoke to Dr. Goss of  regarding the patient's case     Disposition:  Discharged to home.    Impression & Plan     Covid-19  Ramirez Leslie was evaluated during a global COVID-19 pandemic, which necessitated consideration that the patient might be at risk for infection with the SARS-CoV-2 virus that causes COVID-19.   Applicable protocols for evaluation were followed during the patient's care.   COVID-19 was considered as part of the patient's evaluation. The plan for testing is:  a test was obtained during this visit.    Medical Decision Making:  Is a 75-year-old man who is afebrile.  He is hypoxic when he ambulatesRamirez Leslie.  His chest x-ray shows a moderate to large right pleural effusion.  Interventional radiologist was contacted and graciously performed a thoracentesis.  He felt much improved after this and wanted to go home.  His lab work-up is noted as above and unrevealing.  I discussed plan for discharge home with close primary care follow-up.  He reports that he was only prescribed 1 month of his Lasix so has not been taking it as he did not have a refill, however, review of the prescription shows 3 refills so we will contact his pharmacy to get a refill.  We discussed supportive  care at home and return precautions were given.  He is not hypoxic at this time and is ambulating without difficulty and is in no respiratory distress and I think discharge home is reasonable.  His questions were answered and he was in no distress at time of discharge.    Diagnosis:    ICD-10-CM   1. Pleural effusion  J90     Scribe Disclosure:  I, Radha Stern, am serving as a scribe at 2:26 PM on 1/5/2021 to document services personally performed by Bronson Young MD based on my observations and the provider's statements to me.      Bronson Young MD  01/06/21 [id number]

## 2021-01-05 NOTE — ED NOTES
DATE:  1/5/2021   TIME OF RECEIPT FROM LAB:  1458  LAB TEST:  PLT  LAB VALUE:  46  RESULTS GIVEN WITH READ-BACK TO (PROVIDER):  Hector NEGRON  TIME LAB VALUE REPORTED TO PROVIDER:   4039

## 2021-01-05 NOTE — DISCHARGE INSTRUCTIONS
Please  your prescriptions at the [facility].  You had refills from the initial prescription.  Please call your doctors and make a follow-up appointment as soon as possible.

## 2021-01-05 NOTE — PROGRESS NOTES
Right thoracentesis complete. Pt tolerated well. Drained 2200mls fluid. Appears delmy colored. Site covered with bandage. CXR post thora. Pt brought back to ER.

## 2021-01-05 NOTE — ED NOTES
ER tech informed about O2 sat drop 86% on ambulation of Pt. MD aware. Pt is maintaining 95% and greater on RA. Pt reports significant improvement on breathing. Will continue to monitor and assess.

## 2021-01-05 NOTE — ED NOTES
Pt ambulated in room with pulse ox. SpO2 93% at rest and dropped to 86% while walking. No increased SOB noted. RN notified.

## 2021-01-05 NOTE — PROCEDURES
[facility]    Procedure: US right thora    Date/Time: 1/5/2021 4:37 PM  Performed by: Luis Goss MD  Authorized by: Luis Goss MD     UNIVERSAL PROTOCOL   Site Marked: NA  Prior Images Obtained and Reviewed:  Yes  Required items: Required blood products, implants, devices and special equipment available    Patient identity confirmed:  Verbally with patient, arm band, provided demographic data and hospital-assigned identification number  Patient was reevaluated immediately before administering moderate or deep sedation or anesthesia  Confirmation Checklist:  Patient's identity using two indicators, relevant allergies, procedure was appropriate and matched the consent or emergent situation and correct equipment/implants were available  Time out: Immediately prior to the procedure a time out was called    Universal Protocol: the Joint Commission Universal Protocol was followed    Preparation: Patient was prepped and draped in usual sterile fashion    ESBL (mL):  2         ANESTHESIA    Anesthesia: Local infiltration  Local Anesthetic:  Lidocaine 1% without epinephrine      SEDATION    Patient Sedated: No    See dictated procedure note for full details.  Findings: US guided right thoracentesis with removal of about 2 L.    Specimens: none    Complications: None    Condition: Stable    PROCEDURE   Patient Tolerance:  Patient tolerated the procedure well with no immediate complications    Length of time physician/provider present for 1:1 monitoring during sedation: 0

## 2021-01-05 NOTE — ED TRIAGE NOTES
Patient presents to ED for evaluation of shortness of breath. Patient thinks he has a recurrence of a pleural effusion as he started getting more short of breath last week. ABCDs intact.

## 2021-02-09 NOTE — DISCHARGE INSTRUCTIONS
Please see your primary doctor for close follow-up and to help arrange patient thoracentesis as needed.  Return to the ER if having shortness of breath, chest pain, fevers, chills, nausea or vomiting or other acute concerns.  Your primary doctor should also keep a close eye on your blood counts.

## 2021-02-09 NOTE — PROGRESS NOTES
Thoracentesis consent and procedure completed by Dr Edgar.  Tolerated well.  No complications.  VSS.  2200 mLs drained from right lung, clear delmy in color.  Site WDL.  Bandaid applied.  Patient transferred to room via cart in stable condition.

## 2021-02-09 NOTE — ED PROVIDER NOTES
History   Chief Complaint:  Shortness of Breath     The history is provided by the patient.      Ramirez Leslie is a 76 year old male with history of type 2 diabetes mellitus, recurrent right pleural effusion, and cirrhosis with ascites who presents with shortness of breath. The patient reports 3 to 4 days ago he began to develop a shortness of breath sensation that feels very similar to past pleural effusions. This shortness of breath has been worsening over the past few days and has no associated chest pain or abdominal pain. He does have worsening leg swelling despite taking his Lasix and Spironolactone as prescribed. He was seen by his primary care physician one week ago and denies any symptoms of shortness of breath at that time. He otherwise denies fever, cough, rhinorrhea, sore throat, vomiting, or diarrhea. He has no history of known heart disease. He denies drug, alcohol, or tobacco use. He lives at home with his wife.     Review of Systems   Constitutional: Negative for fever.   HENT: Negative for rhinorrhea and sore throat.    Respiratory: Positive for shortness of breath. Negative for cough.    Cardiovascular: Negative for chest pain.   Gastrointestinal: Negative for abdominal pain, diarrhea and vomiting.   All other systems reviewed and are negative.      Allergies:  No Known Allergies    Medications:  Atorvastatin  Coreg  Bentyl  Lasix  Lantus  Metformin  Omeprazole  Spironolactone      Past Medical History:    Cirrhosis with ascities   Hypertension  Nephrolithiasis  Subdural hematoma  Type 2 diabetes mellitus  Atrial fibrillation    Recurrent right pleural effusion     Past Surgical History:    Appendectomy  Orchiectomy   Lithotripsy     Family History:    Father: heart disease     Social History:  Patient lives at home with wife.   Patient denies drug, alcohol, or tobacco use.     Physical Exam     Patient Vitals for the past 24 hrs:   BP Temp Temp src Pulse Resp SpO2   02/09/21 1200 (!) 140/74  -- -- 86 -- 95 %   02/09/21 1145 (!) 145/71 -- -- 83 -- 95 %   02/09/21 1130 -- -- -- -- -- 94 %   02/09/21 1115 (!) 154/79 -- -- -- -- --   02/09/21 1112 (!) 149/76 -- -- 71 18 97 %   02/09/21 1000 -- -- -- 72 17 98 %   02/09/21 0945 (!) 147/74 -- -- 68 13 98 %   02/09/21 0930 (!) 161/82 -- -- 77 16 96 %   02/09/21 0856 (!) 148/86 97.7  F (36.5  C) Oral 84 18 92 %       Physical Exam    Head: No obvious trauma to head.  No acute distress.  Ears, Nose, Throat:  External ears normal.  Nose normal.  No pharyngeal erythema, swelling or exudate.  Midline uvula.    Eyes:  Conjunctivae clear.  Pupils are equal, round, and reactive.   Neck: Normal range of motion.  Neck supple.   CV: Regular rate and rhythm.  No murmurs.      Respiratory: Effort normal and breath sounds diminished in the right lung fields.  No crackles or wheezing   Gastrointestinal: Soft.  No distension. There is no tenderness.  There is no rigidity, no rebound and no guarding.   Musculoskeletal: mild pretibial edema   Neuro: Alert. Moving all extremities appropriately.  Normal speech.    Skin: Skin is warm and dry.  No rash noted.   Psych: Normal mood and affect. Behavior is normal.   Neuro: Alert. Moving all extremities appropriately.  Normal speech.    Skin: Skin is warm and dry.  No rash noted.       Emergency Department Course   ECG  ECG taken at 0907  Sinus rhythm with 1st degree AV block with premature atrial complexes  Incomplete left bundle branch block   No significant change as compared to prior, dated 1/5/21.  Rate 74 bpm. DE interval 210 ms. QRS duration 112 ms. QT/QTc 422/468 ms. P-R-T axes 9  40  2.     Imaging:  XR Chest Port 1 View  IMPRESSION: Decreased volume right pleural effusion. No pleural fluid  on the left. No pneumothorax on either side.  Reading per radiology     US Thoracentesis  IMPRESSION: Technically successful thoracentesis without immediate  complications.  Reading per radiology     XR Chest Port 1 View  IMPRESSION:  Portable chest. Left lung is well expanded and clear. No  left pleural fluid. There is an enlarging right pleural effusion now  moderate in size. Right lung base atelectasis is associated with this  effusion. No pneumothorax. Heart appears normal in size.  Reading per radiology     Laboratory:  Symptomatic Influenza A/B & SARS-CoV2 (COVID19) Virus PCR Multiplex: all negative    CBC: WBC 1.5(L), HGB 11.4(L), PLT 50(L)  CMP: chloride 115(H), glucose 130(H), albumin 2.5(L) o/w WNL (Creatinine 0.84)  INR: 1.49(H)  Troponin (Collected 914): <0.015  BNP: 277    Emergency Department Course:    Reviewed:  I reviewed nursing notes, vitals, past medical history and care everywhere    Assessments:  930 I obtained history and examined the patient as noted above.   1100 The patient was sent for thoracentesis by IR.   1331 I rechecked the patient and explained findings.     Consults:   1041 I spoke with Dr. Edgar of the IR service from [facility] regarding patient's presentation, findings, and plan of care.     Disposition:  The patient was discharged to home.       Impression & Plan     CMS Diagnoses: None    Medical Decision Makin-year-old male with a known history of cirrhosis and recurrent right-sided pleural effusion presents with shortness of breath and concern for reaccumulation of the effusion.  Vital signs are stable.  Broad differential was pursued including not limited to pneumonia, pneumothorax, effusion, COVID-19, influenza, ACS, arrhythmia, PE, CHF, etc.  Patient has previously had his fusion tested and it is transudate of process.  This is likely secondary to his known cirrhosis.  CBC shows pancytopenia consistent with patient's cirrhosis.  Patient has no fever.  Despite low white count do not suspect neutropenic fever or other more serious infectious pathology.  Blood counts are consistent with prior checks.  BMP shows no acute electrolyte, metabolic or renal dysfunction.  LFTs largely  unremarkable.  INR is elevated secondary to cirrhosis.  It is near baseline.  EKG shows sinus rhythm, no evidence of acute ST-T wave changes.  Troponin is negative.  Symptoms do not seem consistent with ACS.  PE was considered but felt to be unlikely given history of prior effusions due to cirrhosis.  BNP is normal not suggestive of CHF.  Chest x-ray confirms right-sided pleural effusion.  Patient is requesting a thoracentesis and discharge.  IR graciously was able to do the thoracentesis.  Repeat x-ray shows no pneumothorax and decreasing right pleural effusion.  Ambulated patient is saturations are largely over 90%, he briefly did desat to 89%.  I offered admission but patient declined.  He is able to voice risk and benefit of admission but opts to discharge home.  He feels well and wishes to go.  I encouraged him to follow-up with his primary care doctor and to have his primary care doctor arrange outpatient thoracentesis as needed.  I encouraged him to follow-up with GI as well.  Strict return precautions were discussed and patient was discharged in stable but improved condition.    Covid-19  Ramirez Leslie was evaluated during a global COVID-19 pandemic, which necessitated consideration that the patient might be at risk for infection with the SARS-CoV-2 virus that causes COVID-19.   Applicable protocols for evaluation were followed during the patient's care.   COVID-19 was considered as part of the patient's evaluation. The plan for testing is:  a test was obtained during this visit.    Diagnosis:    ICD-10-CM    1. Pleural effusion  J90    2. Shortness of breath  R06.02        Scribe Disclosure:  I, Rylee Hawley, am serving as a scribe at 9:20 AM on 2/9/2021 to document services personally performed by Martha Monk MD based on my observations and the provider's statements to me.          Martha Monk MD  02/09/21 5566       Martha Monk MD  02/09/21 4705

## 2021-02-09 NOTE — PROCEDURES
[facility]    Procedure: Right thoracentesis.     Date/Time: 2/9/2021 10:57 AM  Performed by: Martha Edgar DO  Authorized by: Martha Edgar DO     UNIVERSAL PROTOCOL   Site Marked: Yes  Prior Images Obtained and Reviewed:  Yes  Required items: Required blood products, implants, devices and special equipment available    Patient identity confirmed:  Verbally with patient, arm band, provided demographic data and hospital-assigned identification number  Patient was reevaluated immediately before administering moderate or deep sedation or anesthesia  Confirmation Checklist:  Patient's identity using two indicators, relevant allergies, procedure was appropriate and matched the consent or emergent situation and correct equipment/implants were available  Time out: Immediately prior to the procedure a time out was called    Universal Protocol: the Joint Commission Universal Protocol was followed    Preparation: Patient was prepped and draped in usual sterile fashion           ANESTHESIA    Anesthesia: Local infiltration  Local Anesthetic:  Lidocaine 1% without epinephrine      SEDATION    Patient Sedated: No    See dictated procedure note for full details.  Findings: Right thoracentesis    Specimens: none    Complications: None    Condition: Stable    PROCEDURE   Patient Tolerance:  Patient tolerated the procedure well with no immediate complications    Length of time physician/provider present for 1:1 monitoring during sedation: 0

## 2021-02-09 NOTE — ED NOTES
"This tech went into the pt's room to ambulate him.  He was already out of bed, half dressed, tying his shoes.  I asked what he was up to and he stated \" well, I'm ready to go - I feel good and am waiting for the doctor to release me\".  He said he had been out of bed for a bit and was clearly exerting himself getting dressed. Pulse ox shows 88% that increased to 91% over 5 minutes. Pt is not SOB.  Pt again states that he feels good and doesn't think he needs to stay here any longer.  MD notified.  "

## 2021-02-09 NOTE — ED TRIAGE NOTES
Pt arrives with SOB for 3 days. States hx of pleural effusions in past, last was 1 month ago. ABCs intact.

## 2021-02-25 NOTE — ED PROVIDER NOTES
History   Chief Complaint:  Shortness of Breath     The history is provided by the patient.      Ramirez Leslie is a 76 year old male with history of type 2 diabetes mellitus, recurrent right pleural effusion, without talk of leaving a chest tube in, and cirrhosis with ascites who presents with shortness of breath. Per chart review, the patient was seen here in the ED 2 weeks ago for shortness of breath, similar to his past pleural effusions. IR was able to come and perform a thoracentesis with improvement in his symptoms. He reports feeling well until last night, when his shortness of breath returned, and has been worsening since then. He notes his symptoms improve with laying flat and on his left side but worsens with exertion.    Here, the patient reports some abdominal tenderness, distention, and slightly worse leg swelling, despite taking his Lasix and Spironolactone as prescribed. Patient denies fever, cough, chest pain, urinary symptoms, or bowel changes.     Review of Systems   Constitutional: Negative for fever.   Respiratory: Positive for shortness of breath. Negative for cough.    Cardiovascular: Negative for chest pain.   Gastrointestinal: Positive for abdominal distention and abdominal pain. Negative for blood in stool, constipation and diarrhea.   Genitourinary: Negative for decreased urine volume, difficulty urinating, dysuria, frequency, hematuria and urgency.   All other systems reviewed and are negative.        Allergies:  No Known Allergies     Medications:  Atorvastatin  Coreg  Bentyl  Lasix  Lantus  Metformin  Omeprazole  Spironolactone       Past Medical History:    Cirrhosis with ascities   Hypertension  Nephrolithiasis  Subdural hematoma  Type 2 diabetes mellitus  Atrial fibrillation    Recurrent right pleural effusion      Past Surgical History:    Appendectomy  Orchiectomy   Lithotripsy      Family History:    Father: heart disease      Social History:  Patient lives at home with wife.    Patient denies drug, alcohol, or tobacco use.      Physical Exam     Patient Vitals for the past 24 hrs:   BP Temp Temp src Pulse Resp SpO2   02/25/21 1945 110/87 -- -- 88 -- --   02/25/21 1900 -- -- -- -- -- 92 %   02/25/21 1845 -- -- -- 83 -- 92 %   02/25/21 1830 (!) 171/95 -- -- -- -- 93 %   02/25/21 1815 (!) 156/76 -- -- 76 -- 93 %   02/25/21 1800 (!) 161/84 -- -- 75 -- 95 %   02/25/21 1745 (!) 164/79 -- -- 73 -- 94 %   02/25/21 1730 (!) 163/73 -- -- 73 -- 95 %   02/25/21 1715 (!) 150/83 -- -- 71 -- 93 %   02/25/21 1533 139/82 97.1  F (36.2  C) Oral 70 20 94 %       Physical Exam     HENT:  mmm, no rhinorrhea  Eyes: periorbital tissues and sclera normal   Neck: supple, no abnormal swelling  Lungs:  CTAB,  no resp distress  CV: Mild tachypnea, absent breath sounds right base, clear to auscultation of lung fields  Abd: soft, nontender, nondistended, no rebound/masses/guarding/hsm  Ext: Mild distal lower extremity edema  Skin: warm, dry, well perfused, no rashes/bruising/lesions on exposed skin  Neuro: alert, MAEE, no gross motor or sensory deficits, gait stable  Psych: Normal mood, normal affect        Emergency Department Course   Imaging:  XR Chest, Portable, G/E 1 view - Pre-thoracentesis:   New at least moderate-sized right pleural effusion with adjacent consolidation. Trace congestion. Remainder stable. Follow-up recommended to ensure resolution and exclude other underlying pathology. As per radiology.    XR Chest, Portable, G/E 1 view - Post-thoracentesis:   Consolidation seen previously within the right lower lobe and right middle lobe is largely resolved. Probable tiny right pleural effusion. As per radiology.    Laboratory:  CBC: WBC: 2.4 (L), HGB: 11.4 (L), PLT: 55 (L)  BMP: Glucose: 186 (H), Sodium: 145 (H), Chloride: 118 (H), Carbon Dioxide: 19 (L), o/w WNL (Creatinine: 0.80)     Procedures   Procedure: Thoracentesis, ultrasound-guided  Indication: large right pleural effusion, shortness of breath    Informed consent obtained after alternatives, risks and benefits explained.  Patient seated upright.  Fluid collection localized with ultrasound.  Right chest marked and prepped at approximately 7th interspace, posteriorly.  Area prepped, draped, and numbed with 1% lidocaine with epi.  Catheter inserted over needle.  Approx 2200 mL of serosanguinous fluid removed.  Fluid sent for analysis.  Patient tolerated well.        Emergency Department Course:    Reviewed:  I reviewed nursing notes, vitals and past medical history    Assessments:  1710 I obtained history and examined the patient as noted above.     1800 I rechecked the patient and discussed the results of his workup thus far. I performed a bedside ultra sound, see findings above.    1915 I rechecked the patient and discussed the results of his workup thus far. I performed a thoracentesis as noted above. I was able to remove about 2200 mL of fluid.     2030 I rechecked the patient and discussed the results of his workup thus far.     Disposition:  The patient was discharged to home.     Impression & Plan   Medical Decision Making:  Ramirez Leslie is a 76 year old male who presents today with shortness of breath due to recurrent right pleural effusion.  No significant concern for primary cardiogenic or other pulmonary pathology.  We did a thoracentesis as outlined above with significant improvement of the shortness of breath.  He was not hypoxic and requested to be discharged home which seems reasonable given the clinical appearance.  Food has been tested before but transudate if.  Have him follow-up with thoracic surgery if this continues to be a recurrent thing consider a Pleurx catheter.    Diagnosis:    ICD-10-CM    1. Dyspnea, unspecified type  R06.00    2. Pleural effusion  J90     Recurrent     Scribe Disclosure:  Theresa BALTAZAR, am serving as a scribe on 2/25/2021 at 5:04 PM to personally document services performed by Yg Schmid MD,  based on my observations and the provider's statements to me.      Yg Schmid MD  02/26/21 0040

## 2021-02-25 NOTE — ED TRIAGE NOTES
Patient has been SOB x 3 weeks. Patient states worse today and he thinks fluid in lungs. ABC intact alert and no distress.

## 2021-02-26 NOTE — TELEPHONE ENCOUNTER
I called Nash to offer him a consultation Monday.  He was not home so I talked to his wife, Krystal.   I gave her information about the [facility] specialty clinic and told her that Dr. Payne could see him at 2pm there to review his history and discuss Pleurx placement.   I gave her the clinic # as well.    She appreciated the call and said she would pass it on to Nash.

## 2021-02-26 NOTE — PROGRESS NOTES
Writer called Nash and notified him that we have received the referral from Thoracic surgery to discuss PleurX placement with Dr. Payne in Pulmonology.     Writer gave Nash the clinic address and phone number and confirmed the appt date/time. He verbalized understanding and is in agreement with the plan.     Brielle Lopez, RN, BSN, KRISTEN  RN Care Coordinator  [facility]  [phone]

## 2021-02-26 NOTE — TELEPHONE ENCOUNTER
RECORDS STATUS - ALL OTHER DIAGNOSIS      RECORDS RECEIVED FROM:EPIC/ [facility]   DATE RECEIVED: 3/1/2021   NOTES STATUS DETAILS   OFFICE NOTE from referring provider     OFFICE NOTE from medical oncologist N/A    DISCHARGE SUMMARY from hospital Complete 10/29/2020 Pleural Effusion    DISCHARGE REPORT from the ER Complete Epic 2/25/2021 Pleural Effusion     2/9/2021 Shortness of breath,Pleural Effusion     1/5/2021 Pleural Effusion     10/29/2020   Pleural Effusion    OPERATIVE REPORT     MEDICATION LIST Complete [facility]   CLINICAL TRIAL TREATMENTS TO DATE     LABS     PATHOLOGY REPORTS     ANYTHING RELATED TO DIAGNOSIS Complete Labs last updated on 2/25/2021   GENONOMIC TESTING     TYPE:     IMAGING (NEED IMAGES & REPORT)     CT SCANS     MRI     MAMMO     Xray Chest Complete- internal [facility]    Complete- [facility] 2/25/2021, 2/9/2021, 1/5/2021 more in PACS    [facility] 2/12/2017, 2/11/2017, 2/9/2017    ULTRASOUND Complete US Thoracentesis 2/9/2021, 1/5/2021   PET       Action    Action Taken 3/1/2021 8:02am     I called [facility] to have IMG pushed to PACS. Ph: [phone]

## 2021-03-01 NOTE — LETTER
3/1/2021         RE: Ramirez Leslie  [address]  [place] [address]        Dear Colleague,    Thank you for referring your patient, Ramirez Leslie, to the [facility]. Please see a copy of my visit note below.    Patient was not seen. Provider unavailable. Patient will reschedule  Alycia Willett CMA        Again, thank you for allowing me to participate in the care of your patient.        Sincerely,        Kesha Payne MD

## 2021-03-01 NOTE — NURSING NOTE
"Oncology Rooming Note    March 1, 2021 2:06 PM   Ramirez Leslie is a 76 year old male who presents for:    Chief Complaint   Patient presents with     Oncology Clinic Visit     New Patient     Initial Vitals: /66   Pulse 79   Temp 97  F (36.1  C) (Tympanic)   Resp 16   Ht 1.702 m (5' 7\")   Wt 84.8 kg (187 lb)   SpO2 94%   BMI 29.29 kg/m   Estimated body mass index is 29.29 kg/m  as calculated from the following:    Height as of this encounter: 1.702 m (5' 7\").    Weight as of this encounter: 84.8 kg (187 lb). Body surface area is 2 meters squared.  No Pain (0) Comment: Data Unavailable   No LMP for male patient.  Allergies reviewed: Yes  Medications reviewed: Yes    Medications: Medication refills not needed today.  Pharmacy name entered into [employer]: [id number] - [place], [place] - [address] AT [facility]    Clinical concerns: follow up       Alycia Willett CMA              "

## 2021-03-02 NOTE — TELEPHONE ENCOUNTER
I called Nash to discuss briefly possibility of PleurX and apologize for missing our appointment yesterday due to an emergency I was overseeing in the ICU.    Prior right pleural effusions transudative and he has cirrhosis, although hasn't had ascites per se. He was prescribed low dose diuretics in hospital last fall but hasn't really discussed with his GI specialist ([facility]).     I encouraged him to discuss management of ascites with diuretics with GI and we will reschedule appointment to discuss in detail. In general, cirrhosis with hepatic hydrothorax is not an indication for tunneled indwelling pleural catheter. This would be mostly palliative as there are increased risk of complications (infection, worsened protein malnutrition.     Down the road if all other options failed (diuretics, possibly TIPS?) we could reconsider.

## 2021-03-08 NOTE — PROGRESS NOTES
"Oncology Rooming Note    March 8, 2021 10:28 AM   Ramirez Leslie is a 76 year old male who presents for:    Chief Complaint   Patient presents with     Lung Nodule     New Patient     Initial Vitals: /66   Pulse 57   Temp 97  F (36.1  C) (Tympanic)   Resp 16   Ht 1.702 m (5' 7\")   Wt 85.9 kg (189 lb 4.8 oz)   SpO2 98%   BMI 29.65 kg/m   Estimated body mass index is 29.65 kg/m  as calculated from the following:    Height as of this encounter: 1.702 m (5' 7\").    Weight as of this encounter: 85.9 kg (189 lb 4.8 oz). Body surface area is 2.02 meters squared.  No Pain (0) Comment: Data Unavailable   No LMP for male patient.  Allergies reviewed: Yes  Medications reviewed: Yes    Medications: Medication refills not needed today.  Pharmacy name entered into [email or web address]: [facility] #[id number] - [place], [place] - [address] AT [facility]      [facility]    Reason for Visit  Ramirez Leslie is a 76 year old male who is referred by ER for pleural effusion  Pulmonary HPI    - No new respiratory symptoms or complaints.    - for a few months he had difficulty breathing, he ultimately went to ER and had right pleural effusion that was drained.       Other active medical problems include:   - cirrhosis, he is on furosemide and aldactone sees [facility] he is meeting with a hepatologist there later this month    Exposure history: Denies asbestos or radon exposure   TB risk factors: No  Prior Imaging:Yes  Constitutional Symptoms: No  Personal history of cancer:No  Up to date on age-appropriate cancer screening:No    ROS Pulmonary  Dyspnea: Yes, Cough: No, Chest pain: No, Wheezing: No, Sputum Production: No, Hemoptysis: No  A complete ROS was otherwise negative except as noted in the HPI.  The patient was seen and examined by Kesha Payne MD   Current Outpatient Medications   Medication     atorvastatin (LIPITOR) 20 MG tablet "     carvedilol (COREG) 3.125 MG tablet     dicyclomine (BENTYL) 20 MG tablet     furosemide (LASIX) 20 MG tablet     insulin glargine (LANTUS PEN) 100 UNIT/ML pen     metFORMIN (GLUCOPHAGE) 500 MG tablet     omeprazole (PRILOSEC) 20 MG DR capsule     spironolactone (ALDACTONE) 25 MG tablet     No current facility-administered medications for this visit.      No Known Allergies  Social History     Socioeconomic History     Marital status:      Spouse name: Not on file     Number of children: Not on file     Years of education: Not on file     Highest education level: Not on file   Occupational History     Not on file   Social Needs     Financial resource strain: Not on file     Food insecurity     Worry: Not on file     Inability: Not on file     Transportation needs     Medical: Not on file     Non-medical: Not on file   Tobacco Use     Smoking status: Former Smoker     Smokeless tobacco: Never Used   Substance and Sexual Activity     Alcohol use: Not on file     Drug use: Not on file     Sexual activity: Not on file   Lifestyle     Physical activity     Days per week: Not on file     Minutes per session: Not on file     Stress: Not on file   Relationships     Social connections     Talks on phone: Not on file     Gets together: Not on file     Attends Sikhism service: Not on file     Active member of club or organization: Not on file     Attends meetings of clubs or organizations: Not on file     Relationship status: Not on file     Intimate partner violence     Fear of current or ex partner: Not on file     Emotionally abused: Not on file     Physically abused: Not on file     Forced sexual activity: Not on file   Other Topics Concern     Not on file   Social History Narrative     Not on file     Past Medical History:   Diagnosis Date     Cirrhosis (H)      HTN (hypertension)      Nephrolithiasis      Subdural hematoma (H)      Type II diabetes mellitus (H)     insulin dependent     Past Surgical History:  "  Procedure Laterality Date     APPENDECTOMY       HC LAP,ORCHIECTOMY Left      LITHOTRIPSY       Family History   Problem Relation Age of Onset     Heart Disease Father        Exam:   /66   Pulse 57   Temp 97  F (36.1  C) (Tympanic)   Resp 16   Ht 1.702 m (5' 7\")   Wt 85.9 kg (189 lb 4.8 oz)   SpO2 98%   BMI 29.65 kg/m    GENERAL APPEARANCE: Well developed, well nourished, alert, and in no apparent distress.  EYES: PERRL, EOMI  HENT: Nasal mucosa with no edema and no hyperemia. No nasal polyps.  EARS: Canals clear, TMs normal  MOUTH: Oral mucosa is moist, without any lesions, no tonsillar enlargement, no oropharyngeal exudate.  NECK: supple, no masses, no thyromegaly.  LYMPHATICS: No significant axillary, cervical, or supraclavicular nodes.  RESP: normal percussion, reduced air flow right base.  No crackles. No rhonchi. No wheezes.  CV: Normal S1, S2, regular rhythm, normal rate. No murmur.  No rub. No gallop. Bilateral +1/+2 LE edema.   ABDOMEN:  Bowel sounds normal, soft, nontender, no HSM or masses. + fluid wave  MS: extremities normal. No clubbing. No cyanosis.  SKIN: no rash on limited exam  NEURO: Mentation intact, speech normal, normal strength and tone, normal gait and stance  PSYCH: mentation appears normal. and affect normal/bright  Results:  - My interpretation of the images relevant for this visit includes: CXR 2/25/21 moderate right pleural effusion, post-procedure imaging clear; CT CAP Aug 2020 shows large right pleural effusion. Only pleural fluid analysis was 29% lymphs (same as ascites) and transudate. Fluid cytology negative for malignancy      Pleural Fluid 8/31/2020 11:50   Body Fluid Analysis Source Right   Color Fluid Orange   Appearance Fluid Slightly Cloudy   WBC Fluid 986   % Eosinophils Fluid 1   % Lymphocytes Fluid 29   % Mono/Macro Fluid 53   % Neutrophils Fluid 14   % Other Cells Fluid 3   Glucose Fluid 116   Lactate Dehydrogenase Fluid 80   Protein Total Fluid 2.0 "       Assessment and plan: Nash is a 77 yo male with recurrent right pleural effusion, cirrhosis referred for evaluation to place indwelling tunneled pleural catheter.   Recurrent right pleural effusion - probably related to cirrhosis/portal hypertension. He has has more pleural effusion than ascites. He is currently on low dose furosemide and spironolactone. Also planning to meet with hepatologist at [facility] later this month. I am placing the following orders to evaluate the pleural effusion (to provide supporting evidence that it is related to cirrhosis). Indwelling pleural catheter is not first line treatment for recurrent hepatic hydrothorax. Cirrhosis patients might have higher rate of complications with this catheter and repeated drainage. It could be considered for palliation, if other treatments not effective.     Right thoracentesis followed by CT chest  Fluid analysis on specimen at next thoracentesis  Standing order for thoracentesis as needed for comfort  Follow up with Hepatologist as planned - diuretics, possibly TIPS would be first line treatment.   I called his PCP Dr Kurtz at [facility] to let him know that patient needs help with diuretics and was seen by me, planning to see hepatology.     Return to see me in about 3 months or as needed    I spent 70 minutes today examining the patient, reviewing records, documenting visit and conferring with other members of the care team (Dr Kurtz's office).

## 2021-03-08 NOTE — LETTER
"    3/8/2021         RE: Ramirez Leslie  [address]  [place] [address]        Dear Colleague,    Thank you for referring your patient, Ramirez Leslie, to the [facility]. Please see a copy of my visit note below.    Oncology Rooming Note    March 8, 2021 10:28 AM   Ramirez Leslie is a 76 year old male who presents for:    Chief Complaint   Patient presents with     Lung Nodule     New Patient     Initial Vitals: /66   Pulse 57   Temp 97  F (36.1  C) (Tympanic)   Resp 16   Ht 1.702 m (5' 7\")   Wt 85.9 kg (189 lb 4.8 oz)   SpO2 98%   BMI 29.65 kg/m   Estimated body mass index is 29.65 kg/m  as calculated from the following:    Height as of this encounter: 1.702 m (5' 7\").    Weight as of this encounter: 85.9 kg (189 lb 4.8 oz). Body surface area is 2.02 meters squared.  No Pain (0) Comment: Data Unavailable   No LMP for male patient.  Allergies reviewed: Yes  Medications reviewed: Yes    Medications: Medication refills not needed today.  Pharmacy name entered into [employer]: [facility] #[id number] - [place], [place] - [address] AT [facility]      [facility] Initial Visit    Reason for Visit  Ramirez Leslie is a 76 year old male who is referred by ER for pleural effusion  Pulmonary HPI    - No new respiratory symptoms or complaints.    - for a few months he had difficulty breathing, he ultimately went to ER and had right pleural effusion that was drained.       Other active medical problems include:   - cirrhosis, he is on furosemide and aldactone sees [facility] he is meeting with a hepatologist there later this month    Exposure history: Denies asbestos or radon exposure   TB risk factors: No  Prior Imaging:Yes  Constitutional Symptoms: No  Personal history of cancer:No  Up to date on age-appropriate cancer screening:No    ROS Pulmonary  Dyspnea: Yes, Cough: No, Chest pain: No, Wheezing: " No, Sputum Production: No, Hemoptysis: No  A complete ROS was otherwise negative except as noted in the HPI.  The patient was seen and examined by Kesha Payne MD   Current Outpatient Medications   Medication     atorvastatin (LIPITOR) 20 MG tablet     carvedilol (COREG) 3.125 MG tablet     dicyclomine (BENTYL) 20 MG tablet     furosemide (LASIX) 20 MG tablet     insulin glargine (LANTUS PEN) 100 UNIT/ML pen     metFORMIN (GLUCOPHAGE) 500 MG tablet     omeprazole (PRILOSEC) 20 MG DR capsule     spironolactone (ALDACTONE) 25 MG tablet     No current facility-administered medications for this visit.      No Known Allergies  Social History     Socioeconomic History     Marital status:      Spouse name: Not on file     Number of children: Not on file     Years of education: Not on file     Highest education level: Not on file   Occupational History     Not on file   Social Needs     Financial resource strain: Not on file     Food insecurity     Worry: Not on file     Inability: Not on file     Transportation needs     Medical: Not on file     Non-medical: Not on file   Tobacco Use     Smoking status: Former Smoker     Smokeless tobacco: Never Used   Substance and Sexual Activity     Alcohol use: Not on file     Drug use: Not on file     Sexual activity: Not on file   Lifestyle     Physical activity     Days per week: Not on file     Minutes per session: Not on file     Stress: Not on file   Relationships     Social connections     Talks on phone: Not on file     Gets together: Not on file     Attends Zoroastrianism service: Not on file     Active member of club or organization: Not on file     Attends meetings of clubs or organizations: Not on file     Relationship status: Not on file     Intimate partner violence     Fear of current or ex partner: Not on file     Emotionally abused: Not on file     Physically abused: Not on file     Forced sexual activity: Not on file   Other Topics Concern     Not on file  "  Social History Narrative     Not on file     Past Medical History:   Diagnosis Date     Cirrhosis (H)      HTN (hypertension)      Nephrolithiasis      Subdural hematoma (H)      Type II diabetes mellitus (H)     insulin dependent     Past Surgical History:   Procedure Laterality Date     APPENDECTOMY       HC LAP,ORCHIECTOMY Left      LITHOTRIPSY       Family History   Problem Relation Age of Onset     Heart Disease Father        Exam:   /66   Pulse 57   Temp 97  F (36.1  C) (Tympanic)   Resp 16   Ht 1.702 m (5' 7\")   Wt 85.9 kg (189 lb 4.8 oz)   SpO2 98%   BMI 29.65 kg/m    GENERAL APPEARANCE: Well developed, well nourished, alert, and in no apparent distress.  EYES: PERRL, EOMI  HENT: Nasal mucosa with no edema and no hyperemia. No nasal polyps.  EARS: Canals clear, TMs normal  MOUTH: Oral mucosa is moist, without any lesions, no tonsillar enlargement, no oropharyngeal exudate.  NECK: supple, no masses, no thyromegaly.  LYMPHATICS: No significant axillary, cervical, or supraclavicular nodes.  RESP: normal percussion, reduced air flow right base.  No crackles. No rhonchi. No wheezes.  CV: Normal S1, S2, regular rhythm, normal rate. No murmur.  No rub. No gallop. Bilateral +1/+2 LE edema.   ABDOMEN:  Bowel sounds normal, soft, nontender, no HSM or masses. + fluid wave  MS: extremities normal. No clubbing. No cyanosis.  SKIN: no rash on limited exam  NEURO: Mentation intact, speech normal, normal strength and tone, normal gait and stance  PSYCH: mentation appears normal. and affect normal/bright  Results:  - My interpretation of the images relevant for this visit includes: CXR 2/25/21 moderate right pleural effusion, post-procedure imaging clear; CT CAP Aug 2020 shows large right pleural effusion. Only pleural fluid analysis was 29% lymphs (same as ascites) and transudate. Fluid cytology negative for malignancy      Pleural Fluid 8/31/2020 11:50   Body Fluid Analysis Source Right   Color Fluid Orange "   Appearance Fluid Slightly Cloudy   WBC Fluid 986   % Eosinophils Fluid 1   % Lymphocytes Fluid 29   % Mono/Macro Fluid 53   % Neutrophils Fluid 14   % Other Cells Fluid 3   Glucose Fluid 116   Lactate Dehydrogenase Fluid 80   Protein Total Fluid 2.0       Assessment and plan: Nash is a 75 yo male with recurrent right pleural effusion, cirrhosis referred for evaluation to place indwelling tunneled pleural catheter.   Recurrent right pleural effusion - probably related to cirrhosis/portal hypertension. He has has more pleural effusion than ascites. He is currently on low dose furosemide and spironolactone. Also planning to meet with hepatologist at [facility] later this month. I am placing the following orders to evaluate the pleural effusion (to provide supporting evidence that it is related to cirrhosis). Indwelling pleural catheter is not first line treatment for recurrent hepatic hydrothorax. Cirrhosis patients might have higher rate of complications with this catheter and repeated drainage. It could be considered for palliation, if other treatments not effective.     Right thoracentesis followed by CT chest  Fluid analysis on specimen at next thoracentesis  Standing order for thoracentesis as needed for comfort  Follow up with Hepatologist as planned - diuretics, possibly TIPS would be first line treatment.   I called his PCP Dr Kurtz at [facility] to let him know that patient needs help with diuretics and was seen by me, planning to see hepatology.     Return to see me in about 3 months or as needed    I spent 70 minutes today examining the patient, reviewing records, documenting visit and conferring with other members of the care team (Dr Kurtz's office).       Again, thank you for allowing me to participate in the care of your patient.        Sincerely,        Kesha Payne MD

## 2021-03-08 NOTE — PATIENT INSTRUCTIONS
Next steps:  1. Another fluid drainage procedure (thoracentesis) in radiology and then CT scan of chest right after   2. In the meantime, please work closely with Dr Kurtz to manage fluid pills. Blood work may need to be monitored.     We will place a standing for drainage procedure every 2-4 weeks or as needed to remove fluid for comfort.   Please consult with liver specialist as planned (Dr [facility])    Follow up with me in about 3 months    Thora and CT scheduled 3/16/21  Appt with Dr. Payne scheduled 6/21/21  Brielle Lopez, RN, BSN, MAOM  RN Care Coordinator  [facility]  [phone]

## 2021-03-09 NOTE — PROGRESS NOTES
Nash had called clinic and left a voicemail requesting a call back.     Writer called Nash and confirmed all of his appts with him. Nash reports he was not sure where he needed to go for his thoracentesis. Writer gave Nash the address of where he needs to go.     Writer also noticed Nash is scheduled too early for his pre-procedure COVID test. Writer called radiology scheduling with pt to reschedule COVID test to 3 days prior, rather than 6 days prior.      He was given the nurse line if he has any additional questions or concerns. He is aware there are standing orders in for future thoras and he can call the nurse line for assistance in getting those scheduled.     Nash had no further questions or concerns.    Breille Lopez, RN, BSN, KRISTEN  RN Care Coordinator  [facility]  [phone]

## 2021-03-16 NOTE — PROGRESS NOTES
Consent for right thoracentesis obtained by Dr. Edgar. Total 2000 ml's yellow fluid removed and specimen to lab sent as ordered. Sterile dressing to site. States understanding of post care instructions. Vital signs stable.  Pt to Ct for scan post thoracentesis.

## 2021-03-19 NOTE — PROGRESS NOTES
Writer called Nash to discuss Dr. Payne's result note from the 3/16/21 Chest CT:     Brielle Lopez RN   3/19/2021  8:43 AM CDT      Writer called Nash and notified him of Dr. Payne's message. Nash reports he does have a hepatologist in [place] and he will give him a call. Nash understands he can call in to clinic if he has any additional questions or concerns.    Kesha Payne MD   3/19/2021  8:18 AM CDT      Please notify Nash that all my testing indicates the fluid in lungs related to liver cirrhosis and he should work with liver specialist to treat liver disease including diuretics. I don't recommend any additional testing at this time.

## 2021-04-09 NOTE — ED TRIAGE NOTES
ABCs intact. Pt c/o SOB. Pt states he need a thoracentesis. Pt states he had one about a month ago.

## 2021-04-09 NOTE — PROCEDURES
[facility]    Procedure: Imaging Procedure Note    Date/Time: 4/9/2021 3:11 PM  Performed by: Jeferson Cerrato MD  Authorized by: Jeferson Cerrato MD     UNIVERSAL PROTOCOL   Site Marked: Yes  Prior Images Obtained and Reviewed:  Yes  Required items: Required blood products, implants, devices and special equipment available    Patient identity confirmed:  Verbally with patient  Patient was reevaluated immediately before administering moderate or deep sedation or anesthesia  Confirmation Checklist:  Patient's identity using two indicators, relevant allergies, procedure was appropriate and matched the consent or emergent situation and correct equipment/implants were available  Time out: Immediately prior to the procedure a time out was called    Universal Protocol: the Joint Commission Universal Protocol was followed    Preparation: Patient was prepped and draped in usual sterile fashion    ESBL (mL):  2         ANESTHESIA    Anesthesia: Local infiltration  Local Anesthetic:  Lidocaine 1% without epinephrine  Anesthetic Total (mL):  10      SEDATION    Patient Sedated: No    See dictated procedure note for full details.  PROCEDURE   Patient Tolerance:  Patient tolerated the procedure well with no immediate complications  Describe Procedure: US guided R thoracentesis. 500 ml serous fluid removed  Length of time physician/provider present for 1:1 monitoring during sedation: 10

## 2021-04-09 NOTE — PROGRESS NOTES
While in our department pt stated that Dr Payne was no longer going to be his provider and he was going to a liver specialist. Pt still will need a standing order as this was the last procedure on this particular order. The order did state diagnostic and therapeutic however it appears the labs and chemistries were already done and therefore this RN will hold the sample testing today since it was just done 3/16/21. Pt is otherwise well and in no apparent distress and afebrile today.

## 2021-04-09 NOTE — PROGRESS NOTES
Pt was in Radiology today for thoracentesis. Procedure performed by Dr Cerrato . There were no complications during procedure and vitals remained stable throughout. Pt tolerated procedure well, 500 cc's of  Red tinged fluid was removed from right  pleural space. Pt then was given written and verbal instructions. Pt left department in stable and satisfactory condition.

## 2021-04-21 PROBLEM — R53.1 WEAKNESS: Status: ACTIVE | Noted: 2021-01-01

## 2021-04-21 PROBLEM — R41.82 ALTERED MENTAL STATUS, UNSPECIFIED ALTERED MENTAL STATUS TYPE: Status: ACTIVE | Noted: 2021-01-01

## 2021-04-21 PROBLEM — R04.0 EPISTAXIS: Status: ACTIVE | Noted: 2021-01-01

## 2021-04-21 PROBLEM — K76.82 HEPATIC ENCEPHALOPATHY (H): Status: ACTIVE | Noted: 2021-01-01

## 2021-04-21 NOTE — ED NOTES
Bed: ED02  Expected date:   Expected time:   Means of arrival:   Comments:  [facility]  76m  Bloody nose dementia  Weakness  ETA 10   TRIAGED RED

## 2021-04-21 NOTE — ED TRIAGE NOTES
Pt BIBA [facility] 1854 with c/o Fall and nose bleed. EMS stated family heard a thud and went to check on pt where they found him in the bathroom face down. Pt is on blood thinners. Pt does have hx of dementia. Pt is a poor historian. VSS.

## 2021-04-21 NOTE — H&P
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 Admitted to medicine for further management.      # Hepatic encephalopathy  # Falls at home:  Presented with 2-3 day history of confusion and falls per wife and daughter. Reports that he does not have hx of cognitive impairment, A&Ox3 and independent at baseline. Currently is A&Ox2 - however, is slow to respond with questions. Asterixis on exam. Ammonia elevated at 161. Follows with [facility] Hepatology - no documented hx of hepatic encephalopathy per chart review- however, unable to review [facility] records. Not on lactulose or rifaximin PTA. CT Head and C-spine negative for acute pathology. CT Head with brain atrophy and presumed chronic microvascular ischemic changes. No focal neurological deficits on examination. Given 1 time dose of lactulose in the ED. Suspect hepatic encephalopathy is 2/2 infection due to CAP. UA without sign of UTI. SBP will need to be ruled out given history. No signs of GI bleeding at this time, Hgb 11.2 at baseline. Daughter states he had \"dark/black colored,\" stool a few weeks ago but nothing recently. Stool observed in ED was brown in color.   - Start Lactulose 20g TID (goal 3-4 BM per day) with [place] protocol   - Continue IV Ceftriaxone and Azithromycin (coverage for CAP and SBP)  - CAPS team consulted for dx paracentesis w/ labs in AM   - PT/OT  - If no improvement in mental status with antibiotics and lactulose could consider MRI Brain for further evaluation   - Hepatology consulted - appreciate recommendations   - BCx2 " pending  - Add lactic acid level   - Continue 1:1 sitter overnight and evaluate in AM if need for ongoing sitter    # Lactic acidosis:   Lactic acid level checked 2/2 to above - resulted at 2.3. No signs or symptoms of sepsis. Afebrile, VS and BP stable without hypotension. Was started on IV antibiotics as above. BCx pending. Could be secondary to intravascular depletion/poor oral intake.   - Give Albumin 25% 50g once   - Repeat Lactic acid level in 3 hours after albumin completion     # Cirrhosis  # History of SBP:  Follows with [facility], Hepatology. Cirrhosis thought 2/2 NAFLD. CT Chest on 3/16 showed cirrhotic liver morphology, with e/o portal HTN, including splenomegaly, moderate volume ascites and enlarged portosystemic collaterals. On diuretics Lasix and Spironolactone - per wife, appears dose were increased at end of March to Lasix 20mg daily (from 10mg) and Spironolactone 100mg (from 25mg). Not on lactulose/rifaximin as stated above. Has history of SBP in the past. Has recurrent R pleural effusion as below - and wife states patient is scheduled for TIPS procedure on 5/5/21 with [facility].   MELD-Na score: 12 at 4/21/2021  6:01 AM  MELD score: 12 at 4/21/2021  6:01 AM  Calculated from:  Serum Creatinine: 0.93 mg/dL (Rounded to 1 mg/dL) at 4/21/2021  6:01 AM  Serum Sodium: 143 mmol/L (Rounded to 137 mmol/L) at 4/21/2021  6:01 AM  Total Bilirubin: 1.3 mg/dL at 4/21/2021  6:01 AM  INR(ratio): 1.53 at 4/21/2021  6:01 AM  Age: 76 years 2 months  - CAPS team consulted for dx paracentesis   - Hepatology consulted as above   - Start Lactulose 20g TID (goal 3-4 bowel movements per day) plus [place] Protocol as above   - MELD labs daily   - Pt's wife reports he is scheduled for TIPS procedure w/ [facility] on 5/5/21 - dayteam to assess if they can gain access to these records   - Holding Lasix and Spironolactone tonight - resume Lasix 20mg in AM - holding Spironolactone for now.   - Pharmacy medication reconciliation consulted for  diuretic dosing    # Fall from home:   Had 3 falls at home over the past few days in setting of confusion (see HPI). CT Head and C-spine negative for acute pathology. CT Head with brain atrophy and presumed chronic microvascular ischemic changes as above. No chest pain or SOB - ECG with atrial fibrillation, no ischemic changes. Troponin negative. No sign of ACS. Per review A fib is not new diagnosis. Suspect fall is 2/2 hepatic encephalopathy as above and infectious etiology. No focal neurological deficits on examination - therefore do not suspect CVA.  - Continue IV antibiotics  - Treatment of hepatic encephalopathy as above  - PT/OT      # Chronic R maxillary sinus disease with polyp and mucous retention cyst:   Seen on CT Head from today.  -  Would recommend ENT follow-up - could be done inpatient vs outpatient pending hospital course.     # Chronic pancytopenia:  WBC 3.2, Hgb 11.2, Plts 54K. At baseline. No signs of bleeding.  - Trend CBC in AM     # CAP:   CXR with new hazy ill defined opacities involving both lung bases R > L suspicious for alveolar infiltrate related to pneumonia. Minimal right sided pleural fluid. On room air. Denies any SOB or cough. COVID/Influenxa PCR negative. Wife denies any COVID contacts. Patient is not vaccinated.   - Continue Ceftriaxone and Azithromycin as above    # Hx of recurrent R pleural effusion:  Follows with Pulmonology Dr. Payne - last visit 3/8/21 - thought related to cirrhosis/portal HTN. On diuretics as above. Had CT Chest 3/16 demonstrating this small R pleural effusion w/ associated basilar atelectasis/consolidation. Had thoracentesis 4/9 with 500cc clear fluid removed - albumin 0.9, glucose 99, protein fluid 1.7, LDH 76, cell count with %24 lymph's, % 64 monos and 801 WBC, cytology negative for malignancy with reactive reactive mesothelial cells and lymphocytes. Pleural effusion thought 2/2 possible hepatic hydrothorax, in which pulmonologist discussed indwelling  "pleural catheter is not first line for recurrent hepatic hydrothorax and recommended follow-up with Hepatologist for diuretic assistance and possibly TIPS. Per wife, is scheduled for TIPS on 5/5/21. CXR today with minimal R pleural fluid.   - Monitor   - Supplemental oxygen for POx <92%  - If symptomatic or hypoxic - will consider thoracentesis   - Resume Lasix in AM, holding Spironolactone    # Atrial fibrillation  # HTN  # HLD:  Diagnosed in 10/2020 at OSH. TSH at that time WNL. Currently ECG with atrial fibrillation, -110bpm. Asymptomatic. Troponin negative. On Carvedilol 3.125mg BID. Not on ASA or anticoagluation. Discussed with wife and she was unaware of this history. States he may have been on a \"blood thinner,\" in the past - but not any more due to his low platelets. Given his liver disease and recent HE/fall risk and previous history of SDH - anticoagulation at this time would be a risk. CHADSVASC 4 (age, HTN, DM).   - Telemetry   - Continue PTA Carvedilol 3.125mg BID  - Continue Atorvastatin 20mg daily  - Holding ASA and anticoagulation for now as discussed above - continue discussion regarding anticoagulation - Given his liver disease and recent HE/fall risk and previous history of SDH - anticoagulation at this time would be a risk.    # Prolonged QTc:  QTc 491. Will continue Azithromycin - however continue to monitor.   - Continue to monitor with ECG for QTc  - Avoid QTc prolonging medications as able. K 3.6, Mag WNL.   - Telemetry     # Splenic artery aneurysm:  Found incidentally on CT Chest from 3/16 - 2.5cm splenic artery aneurysm at splenic hilum.   - Monitor - discuss with GI and consider discussing with Vascular surgery regarding this incidental finding     Diet: Moderate CHO, Low Na <2g diet   Fluids: Albumin 25% 50g once   DVT Prophylaxis: Mechanical prophylaxis   Code Status: Full Code  [place]: none    Disposition Plan   Expected discharge: 4 - 7 days; recommended to TBD once antibiotic " "plan in place, pending PT/OT recommendations, Hepatology recommendations and mental status at baseline.    Patient was discussed with Dr. Caitlin Gonzalez, attending physician who agrees with plan of care.     Emily Lane PA-C  Hospitalist Service  [facility]  Pager [phone]    Chief Complaint   Confusion, falls at home x2-3 days    History is obtained from the patient however limited due to altered mental status. History was also obtained by ED note as well as phone call to patient's wife Krystal and daughter.     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 Admitted to medicine for further management.      Patient's wife Krystal reports that patient has been acting confused for the past 2-3 days. He is normally A&Ox3 and is independent at baseline per wife. She states that he has not been answering questions appropriately, and has been unsteady and falling. He has had 3 falls over the past few days. One fall occurred when he was getting out of the bathtub which he fell on tailbone and states he has bruise on this area, another occurred in hallway which he was able to get back up, and another happened last evening. She states last evening, he either got up to go out of bed or fell out of bed onto the floor. She was concerned patient went to the ED.     Per wife patient is scheduled for TIPS procedure on 5/5/21 with [facility]. He is not on lactulose or rifaximin. She sates she has not gotten confused from his liver disease in the past. He has been having bowel movements - wife states he has been having frequent bowel movements at home. Daughter states he had \"dark/black colored,\" stool a few " weeks ago but nothing recently.     She denies him having other complaints over the past few days.    Currently, patient is oriented to self, place (states it is a hospital), and month (not date or year). Denies chest pain, SOB, abdominal pain, N/V. No dysuria. No fever or chills.         Review of Systems   10 point ROS performed and negative unless otherwise noted in HPI    Past Medical History    I have reviewed this patient's medical history and updated it with pertinent information if needed.   Past Medical History:   Diagnosis Date     Cirrhosis (H)      HTN (hypertension)      Nephrolithiasis      Subdural hematoma (H)      Type II diabetes mellitus (H)     insulin dependent        Past Surgical History   I have reviewed this patient's surgical history and updated it with pertinent information if needed.  Past Surgical History:   Procedure Laterality Date     APPENDECTOMY       HC LAP,ORCHIECTOMY Left      LITHOTRIPSY          Social History   Social History     Tobacco Use     Smoking status: Former Smoker     Packs/day: 2.00     Years: 40.00     Pack years: 80.00     Start date: 1957     Quit date: 2007     Years since quittin.8     Smokeless tobacco: Never Used   Substance Use Topics     Alcohol use: None     Drug use: None       Family History   I have reviewed this patient's family history and updated it with pertinent information if needed.   Family History   Problem Relation Age of Onset     Heart Disease Father        Prior to Admission Medications   Prior to Admission Medications   Prescriptions Last Dose Informant Patient Reported? Taking?   atorvastatin (LIPITOR) 20 MG tablet   Yes No   Sig: Take 20 mg by mouth every morning    carvedilol (COREG) 3.125 MG tablet  Spouse/Significant Other Yes No   Sig: Take 3.125 mg by mouth 2 times daily (with meals)   dicyclomine (BENTYL) 20 MG tablet   Yes No   Sig: Take 20 mg by mouth 2 times daily   furosemide (LASIX) 20 MG tablet   No No   Sig:  "Take 0.5 tablets (10 mg) by mouth daily   insulin glargine (LANTUS PEN) 100 UNIT/ML pen   Yes No   Sig: Inject 16 Units Subcutaneous every morning    metFORMIN (GLUCOPHAGE) 500 MG tablet   Yes No   Sig: Take 500 mg by mouth 2 times daily (with meals)   omeprazole (PRILOSEC) 20 MG DR capsule   Yes No   Sig: Take 20 mg by mouth 2 times daily   spironolactone (ALDACTONE) 25 MG tablet   No No   Sig: Take 1 tablet (25 mg) by mouth daily      Facility-Administered Medications: None     Allergies   No Known Allergies    Physical Exam   BP (!) 158/78   Pulse 110   Temp 97.4  F (36.3  C) (Axillary)   Resp 20   Ht 1.727 m (5' 8\")   Wt 85.7 kg (189 lb)   SpO2 92%   BMI 28.74 kg/m    GENERAL: Alert and oriented x 2. NAD. Follows commands. Needs redirection.   HEENT: Anicteric sclera. PERRL. Mucous membranes moist and without lesions.   CV: RRR. S1, S2. No murmurs appreciated.   RESPIRATORY: Effort normal on room air. Lungs CTAB with no wheezing, rales, rhonchi.   GI: Abdomen soft and non distended, bowel sounds present. No tenderness, rebound, guarding. There is ecchymosis on umbilical region.   MUSCULOSKELETAL: No joint swelling or tenderness.   NEUROLOGICAL: No focal deficits. Moves all extremities. CN III-XII grossly intact. Follows commands. No facial asymmetry. + Asterixis.   EXTREMITIES: 1+ peripheral edema. Intact bilateral pedal pulses.   SKIN: No jaundice. No rashes. Skin ecchymosis to bilateral forearms. Skin tear on right hand.     Data   Data reviewed today: I reviewed all medications, new labs and imaging results over the last 24 hours.        "

## 2021-04-21 NOTE — ED PROVIDER NOTES
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 History is limited due to altered mental status.  Per EMS family reported a fall on Saturday while he was in the bathtub and reports that patient fell onto his buttocks.  Since this time patient has had increased weakness and fatigue along with increased confusion over the past 2 to 3 days.  She does have a history of dementia at baseline.  This evening family heard a thump and vomited patient next to the bed.  Unclear if patient rolled or fell out of bed.  Patient was able to ambulate around the house afterwards however he was having difficulty finding the bathroom, he was not acting his normal self, seeemed confused, weak, and family noticed him sitting on the stairs with a bloody nose.  No other history able to be obtained at this time due to altered mental status.  Patient denies any specific physical complaints.      Past Medical History  Past Medical History:   Diagnosis Date     Cirrhosis (H)      HTN (hypertension)      Nephrolithiasis      Subdural hematoma (H)      Type II diabetes mellitus (H)     insulin dependent     Past Surgical History:   Procedure Laterality Date     APPENDECTOMY       HC LAP,ORCHIECTOMY Left      LITHOTRIPSY       atorvastatin (LIPITOR) 20 MG tablet  carvedilol (COREG) 3.125 MG tablet  dicyclomine (BENTYL) 20 MG tablet  furosemide (LASIX) 20 MG tablet  insulin glargine (LANTUS PEN) 100 UNIT/ML pen  metFORMIN (GLUCOPHAGE) 500 MG tablet  omeprazole (PRILOSEC) 20 MG DR capsule  spironolactone (ALDACTONE) 25 MG tablet      No Known Allergies  Family  "History  Family History   Problem Relation Age of Onset     Heart Disease Father      Social History   Social History     Tobacco Use     Smoking status: Former Smoker     Packs/day: 2.00     Years: 40.00     Pack years: 80.00     Start date: 1957     Quit date: 2007     Years since quittin.8     Smokeless tobacco: Never Used   Substance Use Topics     Alcohol use: None     Drug use: None      Past medical history, past surgical history, medications, allergies, family history, and social history were reviewed with the patient. No additional pertinent items.       Review of Systems   Unable to perform ROS: Mental status change     Physical Exam   BP: 132/72  Pulse: 68  Temp: 97.4  F (36.3  C)  Resp: 29  Height: 172.7 cm (5' 8\")  Weight: 85.7 kg (189 lb)  SpO2: 93 %  Physical Exam  General: Afebrile, no acute distress   HEENT: Normocephalic, atraumatic, PERRL, pupils 2 mm b/l, +dried blood nares, Dry mucous membranes  Neck: non-tender, supple  Cardio: regular rate. regular rhythm   Resp: Normal work of breathing, no respiratory distress, lungs clear bilaterally, no wheezing, rhonchi, rales  Chest/Back: no visual signs of trauma, no CVA tenderness   Abdomen: soft, non distension, no tenderness, no peritoneal signs   Neuro:  Decreased responsiveness but arousable to verbal stimuli, patient is alert and oriented to person and city of residence ([place]), unable to follow commands however no focal neurological deficit   MSK: no deformities. Normal range of motion  Integumentary/Skin:  Diffuse scattered ecchymosis of various ages on upper extremities, abdomen, forehead  Psych: normal affect, normal behavior    ED Course      Procedures          EKG Interpretation:      Interpreted by Nikki Troy MD  Time reviewed: 602  Symptoms at time of EKG: AMS   Rhythm: Junctional rhythm  Rate: normal -94  Axis: normal  Ectopy: none  Conduction: normal  ST Segments/ T Waves: No acute ischemic change  Q Waves: " none  Comparison to prior: no significant change when compared to prior    Clinical Impression: Junctional rhythm with no acute ischemic change    CRITICAL CARE: 45 minutes exclusive of procedures but including obtaining history, bedside examination, supervision of care, record review and collateral history from other parties, documentation, review/interpretation of imaging and lab results, discussion with patient, family, nursing, ancillary staff, consultants, and admitting service provider.   This patient presented with AMS, weakness, hepatic encephalopathy requiring immediate bedside evaluation and intervention to prevent sudden, clinically significant, or life threatening deterioration in the patient's condition.       Results for orders placed or performed during the hospital encounter of 04/21/21   CT Head w/o Contrast     Status: None    Narrative    EXAM: CT HEAD W/O CONTRAST  LOCATION: [facility]  DATE/TIME: 4/21/2021 6:06 AM    INDICATION: Traumatic injury  COMPARISON: 03/17/2017  TECHNIQUE: Routine CT Head without IV contrast. Multiplanar reformats. Dose reduction techniques were used.    FINDINGS:  INTRACRANIAL CONTENTS: No intracranial hemorrhage, extraaxial collection, or mass effect.  No CT evidence of acute infarct. Moderate presumed chronic small vessel ischemic changes. Mild generalized volume loss. No hydrocephalus. Atherosclerotic   calcification of the internal carotid arteries and vertebral arteries.    VISUALIZED ORBITS/SINUSES/MASTOIDS: No intraorbital abnormality. Polypoid mucosal thickening with hyperdense secretions and chronic mucoperiosteal reaction in the right maxillary sinus. No middle ear or mastoid effusion.    BONES/SOFT TISSUES: No acute abnormality.      Impression    IMPRESSION:  1.  No CT evidence for acute intracranial process.  2.  Brain atrophy and presumed chronic microvascular ischemic changes as above.  3.  Chronic right maxillary sinus disease with a polyp  or mucous retention cyst. Hyperdense secretions may reflect inspissated secretions or superimposed fungal colonization.   Cervical spine CT w/o contrast     Status: None    Narrative    EXAM: CT CERVICAL SPINE W/O CONTRAST  LOCATION: [facility]  DATE/TIME: 4/21/2021 6:06 AM    INDICATION: Traumatic injury  COMPARISON: None.  TECHNIQUE: Routine CT Cervical Spine without IV contrast. Multiplanar reformats. Dose reduction techniques were used.    FINDINGS:  VERTEBRA: Normal vertebral body heights and alignment. No fracture or posttraumatic subluxation.     CANAL/FORAMINA: Multilevel degenerative changes, with at least mild canal stenosis at C6-C7. There is multilevel bilateral neural foraminal narrowing, with moderate to severe stenosis on the right at C2-C3, and on the left at C3-C4, C4-C5, and C6-C7.    PARASPINAL: Emphysematous changes in the lung apices. Atherosclerotic calcification of the carotid bifurcations.      Impression    IMPRESSION:  1.  Multilevel degenerative changes of the cervical spine. No evidence of an acute displaced fracture.   XR Chest Port 1 View     Status: None    Narrative    EXAM: XR CHEST PORT 1 VIEW  LOCATION: [facility]  DATE/TIME: 4/21/2021 6:33 AM    INDICATION: Altered mental status  COMPARISON: 02/25/2021      Impression    IMPRESSION: New hazy ill-defined opacities involving both lung bases, greater on the right lung bases and suspicious for alveolar infiltrate related to pneumonia. New minimal right-sided pleural fluid. Normal heart size and pulmonary vascularity.   Tortuous aorta. Degenerative changes in the spine and shoulders.   CBC with platelets differential     Status: Abnormal   Result Value Ref Range    WBC 2.7 (L) 4.0 - 11.0 10e9/L    RBC Count 3.91 (L) 4.4 - 5.9 10e12/L    Hemoglobin 12.2 (L) 13.3 - 17.7 g/dL    Hematocrit 35.4 (L) 40.0 - 53.0 %    MCV 91 78 - 100 fl    MCH 31.2 26.5 - 33.0 pg    MCHC 34.5 31.5 - 36.5 g/dL    RDW 15.9 (H) 10.0  - 15.0 %    Platelet Count 61 (L) 150 - 450 10e9/L    Diff Method Automated Method     % Neutrophils 56.5 %    % Lymphocytes 22.1 %    % Monocytes 15.1 %    % Eosinophils 5.2 %    % Basophils 0.7 %    % Immature Granulocytes 0.4 %    Nucleated RBCs 0 0 /100    Absolute Neutrophil 1.5 (L) 1.6 - 8.3 10e9/L    Absolute Lymphocytes 0.6 (L) 0.8 - 5.3 10e9/L    Absolute Monocytes 0.4 0.0 - 1.3 10e9/L    Absolute Eosinophils 0.1 0.0 - 0.7 10e9/L    Absolute Basophils 0.0 0.0 - 0.2 10e9/L    Abs Immature Granulocytes 0.0 0 - 0.4 10e9/L    Absolute Nucleated RBC 0.0    Comprehensive metabolic panel     Status: Abnormal   Result Value Ref Range    Sodium 143 133 - 144 mmol/L    Potassium 3.6 3.4 - 5.3 mmol/L    Chloride 115 (H) 94 - 109 mmol/L    Carbon Dioxide 18 (L) 20 - 32 mmol/L    Anion Gap 11 3 - 14 mmol/L    Glucose 122 (H) 70 - 99 mg/dL    Urea Nitrogen 15 7 - 30 mg/dL    Creatinine 0.93 0.66 - 1.25 mg/dL    GFR Estimate 80 >60 mL/min/[1.73_m2]    GFR Estimate If Black >90 >60 mL/min/[1.73_m2]    Calcium 8.9 8.5 - 10.1 mg/dL    Bilirubin Total 1.3 0.2 - 1.3 mg/dL    Albumin 2.8 (L) 3.4 - 5.0 g/dL    Protein Total 6.5 (L) 6.8 - 8.8 g/dL    Alkaline Phosphatase 119 40 - 150 U/L    ALT 28 0 - 70 U/L    AST 31 0 - 45 U/L   Lipase     Status: None   Result Value Ref Range    Lipase 378 73 - 393 U/L   Ammonia     Status: Abnormal   Result Value Ref Range    Ammonia 161 (HH) 10 - 50 umol/L   INR     Status: Abnormal   Result Value Ref Range    INR 1.53 (H) 0.86 - 1.14   UA with Microscopic reflex to Culture     Status: Abnormal    Specimen: Urine catheter; Catheterized Urine   Result Value Ref Range    Color Urine Yellow     Appearance Urine Clear     Glucose Urine Negative NEG^Negative mg/dL    Bilirubin Urine Negative NEG^Negative    Ketones Urine Negative NEG^Negative mg/dL    Specific Gravity Urine 1.023 1.003 - 1.035    Blood Urine Trace (A) NEG^Negative    pH Urine 5.5 5.0 - 7.0 pH    Protein Albumin Urine 20 (A)  NEG^Negative mg/dL    Urobilinogen mg/dL 4.0 (H) 0.0 - 2.0 mg/dL    Nitrite Urine Negative NEG^Negative    Leukocyte Esterase Urine Negative NEG^Negative    Source Catheterized Urine     WBC Urine 1 0 - 5 /HPF    RBC Urine 13 (H) 0 - 2 /HPF    Squamous Epithelial /HPF Urine 0 0 - 1 /HPF    Mucous Urine Present (A) NEG^Negative /LPF   Troponin I     Status: None   Result Value Ref Range    Troponin I ES <0.015 0.000 - 0.045 ug/L     Medications   lactulose (CHRONULAC) solution 20 g (has no administration in time range)   cefTRIAXone (ROCEPHIN) 2 g vial to attach to  ml bag for ADULTS or NS 50 ml bag for PEDS (has no administration in time range)   azithromycin (ZITHROMAX) 500 mg in sodium chloride 0.9 % 250 mL intermittent infusion (has no administration in time range)        Assessments & Plan (with Medical Decision Making)   Ramirez Leslie is a 76 year old male who has a 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  Upon arrival patient is chronically ill-appearing, afebrile, no distress.  Blood pressure 132/72, heart rate 68 bpm, oxygent 93% on RA, oxygen Patient is confused but unclear of patients baseline given his history of dementia.  Patient is sleepy but arousable to verbal stimuli, is ableto answer some questions and is oriented to his name and place of residence ([place]).  Patient is unable to follow  Commands but has no focal deficit.  On examination patient with ecchymosis noted to right frontal forehead, nares with dried blood, no active bleeding, dry mucous membranes, diffuse  scattered ecchymosis over bilateral upper extremities and abdomen. Abdomen is soft, non-distended, non-tender, no peritoneal signs.  Differential diagnosis includes but is not limited to infectious versus metabolic versus  hepatic encephalopathy versus Sirs versus sepsis versus intracranial hemorrhage versus dehydration among others.  At this time will plan for comprehensive labs including blood cultures, urinalysis, chest x-ray, will obtain CT imaging of his head and cervical spine given AMS, ecchymosis and concern for possible fall.  Patient refusing to keep c-collar on. Likely admission to hospital for further evaluation and treatment of his altered mental status.    I reviewed comprehensive labs which are remarkable for white blood cells count 3.2, hemoglobin 11.2, no acute metabolic or electrolyte abnormality, chloride mildly elevated at 118, no transaminitis, ammonia elevated at 161. Urinalysis with no signs of acute infection, negative troponin, COVID, influenza. I reviewed CXR which demonstrates new hazy ill opacities involving both lung bases greater than right, suspicious for albumin will treat related to pneumonia, new minimal right-sided pleural effusion.  No cardiomegaly.  I reviewed CT scan of the head which is unremarkable with no acute intracranial process, hemorrhage, mass-effect.  I reviewed CT scan of the cervical spine which demonstrates no acute displaced fracture, degenerative changes of the cervical spine.    Patient treated with lactulose for elevated ammonia/hepatic encephalopathy. Will also start IV antibiotics and treat with Ceftriaxone, Azithromycin given patients weakness, AMS, and CXR findings. Blood cultures pending.  At this time plan on admission to the internal medicine service for further evaluation and treatment of his altered mental status, hepatic encephalopathy, weakness.     I have reviewed the nursing notes. I have reviewed the findings, diagnosis, plan and need for follow up with the patient.    New Prescriptions    No medications on file       Final diagnoses:   Altered mental status, unspecified altered mental status type   Hepatic encephalopathy (H)   Weakness   Epistaxis       --  Nikki  MOJGAN Troy MD  [facility] EMERGENCY DEPARTMENT  4/21/2021     Nikki Troy MD  04/21/21 2043

## 2021-04-21 NOTE — LETTER
Transition Communication Referral     Name: Ramirez Leslie  : 1945  MRN #: [id number]  Primary Care Provider: TORSTEN OJEDA  Primary Clinic: [facility] [address] [facility] [place] [address]     Reason for Hospitalization:    Hepatic encephalopathy (H) [K72.90]  Epistaxis [R04.0]  Weakness [R53.1]  Altered mental status, unspecified altered mental status type [R41.82]  Encounter for screening laboratory testing for severe acute respiratory syndrome coronavirus 2 (SARS-CoV-2) [Z20.822]    Admit Date/Time: 2021  5:45 AM  Discharge Date: TOMORROW     Payor Source: Payor: [employer] / Plan: [employer] MEDICARE ADVANTAGE         Reason for Referral:  Home PT & OT have been recommended    Discharge Plan: return home; lives with spouse and adult daughter    Discharge Needs Assessment:  Needs      Most Recent Value   Equipment Currently Used at Home  -- [owns a 4WW]        Follow-up plan:    Future Appointments   Date Time Provider Department Center   2021  6:00 AM Araceli Mahajan, PT [facility] O   2021 11:30 AM Kesha Payne MD [facility]       Any outstanding tests or procedures:        Referrals     Future Labs/Procedures    Home Care OT Referral for Hospital Discharge     Comments:    [facility] Office  Ph: [phone]  Fax: [phone]    OT to eval and treat, home safety eval    Your provider has ordered home care - occupational therapy.   If you have not been contacted within 2 days of your discharge please call    Home Care PT Referral for Hospital Discharge     Comments:    [facility] Office  Ph: [phone]  Fax: [phone]    PT to eval and treat, home safety eval    Your provider has ordered home care - physical therapy.   If you have not been contacted within 2 days of your discharge please call            THANK YOU!!!    Maria Elena Holland RN, BSN, PHN  Care Coordinator  [facility]   [facility]  Direct phone: [phone]       AVS/Discharge Summary is the source of truth; this is a helpful guide for improved communication of patient story

## 2021-04-22 NOTE — CONSULTS
GASTROENTEROLOGY CONSULTATION      Date of Admission:  4/21/2021           Reason for Consultation:   We were asked by Dr. 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      #. Decompensated cirrhosis:  Due to alcohol, follows w/ Dr. Cruz at [facility]    -- HE: alert and oriented but patient has asterixis on exam, on lactulose   -- EV/GV: unclear, unable to see [facility] records, patient denied a hx of GIB  -- Coagulopathy: INR 1.83, Plts 39  -- Anemia: Hgb 9.9  -- Ascites/SBP: Ascites on most recent CT, also has a hepatic hydrothorax requiring intermittent therapeutic thoracenteses, on Lasix 10mg, spironolactone 25mg every day    -- HCC: None per last CT w/ con 3/16  -- MELD-Na: MELD-Na score: 15 at 4/22/2021  6:08 AM  MELD score: 15 at 4/22/2021  6:08 AM  Calculated from:  Serum Creatinine: 0.82 mg/dL (Rounded to 1 mg/dL) at 4/22/2021  6:08 AM  Serum Sodium: 144 mmol/L (Rounded to 137 mmol/L) at 4/22/2021  6:08 AM  Total Bilirubin: 1.8 mg/dL at 4/22/2021  6:08 AM  INR(ratio): 1.83 at 4/22/2021  6:08 AM  Age: 76 years 2 months     Recommendations  - Agree w/ infectious w/u (diagnostic paracentesis, blood cxs, UA)   - Lactulose PO, titrate to 3-4 BMs per day   - Continue diuretics   - Abd U/S with Doppler   - Ensure sodium restriction to 2000 mg per day and high protein diet     Thank you for involving us in this patient's care. Please do not hesitate to contact the GI service with any questions or concerns.     Pt care plan discussed with Dr. Salazar, GI staff physician.    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     The patient reports slipping in his bathroom and falling on 4/20. He denied hitting his head or hurting any other part of his body, he also denied loss of consciousness. He reports having 3-4 nonbloody BM's/day on lactulose. He denied fevers or chills. He follows w/ Dr. Cruz at [facility] and reports he last saw him in clinic ~6mo ago.          Data:   Key relevant imaging:  EXAM: CT HEAD W/O CONTRAST  LOCATION: [facility]  DATE/TIME: 4/21/2021 6:06 AM  IMPRESSION:  1.  No CT evidence for acute intracranial process.  2.  Brain atrophy and presumed chronic microvascular ischemic changes as above.  3.  Chronic right maxillary sinus disease with a polyp or mucous retention cyst. Hyperdense secretions may reflect inspissated secretions or superimposed fungal colonization    EXAM: CT CERVICAL SPINE W/O CONTRAST  LOCATION: [facility]  DATE/TIME: 4/21/2021 6:06 AM  IMPRESSION:  1.  Multilevel degenerative changes of the cervical spine. No evidence of an acute displaced fracture           Previous Endoscopy:   Unable to see [facility] records, reports he had an screening EGD ~8mo ago and reports having a colonoscopy many years ago          Medications:   (Not in a hospital admission)            Physical Exam:   /51   Pulse 70   Temp 98.4  F (36.9  C) (Oral)   Resp 25   Ht 1.727 m (5' 8\")   Wt 85.7 kg (189 lb)   SpO2 95%   BMI 28.74 kg/m    Wt:   Wt Readings from Last 2 Encounters:   04/21/21 85.7 kg (189 lb)   03/08/21 85.9 kg (189 lb 4.8 oz)      Constitutional: NAD, on RA  Eyes: " conjunctiva anicteric  CV: No edema  Respiratory: Unlabored breathing  Abd: nondistended, soft, nontender, no peritoneal signs  Skin: warm, perfused, no jaundice  Neuro: AAO x 3, asterixis           Past Medical History:   Reviewed and edited as appropriate  Past Medical History:   Diagnosis Date     Cirrhosis (H)      HTN (hypertension)      Nephrolithiasis      Subdural hematoma (H)      Type II diabetes mellitus (H)     insulin dependent            Past Surgical History:   Reviewed and edited as appropriate   Past Surgical History:   Procedure Laterality Date     APPENDECTOMY       HC LAP,ORCHIECTOMY Left      LITHOTRIPSY              Social History:   Alcohol use: reports quitting 6yrs ago  Smoking history: denied   Living situation: lives independently with wife and daughter in [place], [place]; ambulates w/ a walker         Family History:   Reviewed and edited as appropriate  Family History   Problem Relation Age of Onset     Heart Disease Father      No known history of colorectal cancer, liver disease, or inflammatory bowel disease.         Allergies:   Reviewed and edited as appropriate   No Known Allergies           Review of Systems:     A complete 10 point review of systems was performed and is negative except as noted in the HPI

## 2021-04-22 NOTE — ED NOTES
[facility]   ED Nurse to Floor Handoff     Ramirez Leslie is a 76 year old male who speaks English and live wife,  in a home  They arrived in the ED by ambulance from home    ED Chief Complaint: Fall and Epistaxis    ED Dx;   Final diagnoses:   Altered mental status, unspecified altered mental status type   Hepatic encephalopathy (H)   Weakness   Epistaxis         Needed?: No    Allergies: No Known Allergies.  Past Medical Hx:   Past Medical History:   Diagnosis Date     Cirrhosis (H)      HTN (hypertension)      Nephrolithiasis      Subdural hematoma (H)      Type II diabetes mellitus (H)     insulin dependent      Baseline Mental status: Pt has been dealing with confusion the last two weeks per wife.  Current Mental Status changes:     Infection present or suspected this encounter: no  Sepsis suspected: No  Isolation type: No active isolations  Patient tested for COVID 19 prior to admission: YES     Activity level - Baseline/Home:  Independent  Activity Level - Current:   Stand with Assist    Bariatric equipment needed?: No    In the ED these meds were given:   Medications   lidocaine 1 % 0.1-1 mL (has no administration in time range)   lidocaine (LMX4) cream (has no administration in time range)   sodium chloride (PF) 0.9% PF flush 3 mL (3 mLs Intracatheter Given 4/22/21 0824)   sodium chloride (PF) 0.9% PF flush 3 mL (has no administration in time range)   sodium chloride (PF) 0.9% PF flush 3 mL (has no administration in time range)   melatonin tablet 1 mg (has no administration in time range)   ondansetron (ZOFRAN-ODT) ODT tab 4 mg (has no administration in time range)     Or   ondansetron (ZOFRAN) injection 4 mg (has no administration in time range)   prochlorperazine (COMPAZINE) injection 5 mg (has no administration in time range)     Or   prochlorperazine (COMPAZINE) tablet 5 mg (has no administration in time range)     Or   prochlorperazine  (COMPAZINE) suppository 12.5 mg (has no administration in time range)   bisacodyl (DULCOLAX) Suppository 10 mg (has no administration in time range)   polyethylene glycol (MIRALAX) Packet 17 g (has no administration in time range)   senna-docusate (SENOKOT-S/PERICOLACE) 8.6-50 MG per tablet 1 tablet (has no administration in time range)     Or   senna-docusate (SENOKOT-S/PERICOLACE) 8.6-50 MG per tablet 2 tablet (has no administration in time range)   senna-docusate (SENOKOT-S/PERICOLACE) 8.6-50 MG per tablet 1 tablet (1 tablet Oral Not Given 4/22/21 0823)     Or   senna-docusate (SENOKOT-S/PERICOLACE) 8.6-50 MG per tablet 2 tablet ( Oral See Alternative 4/22/21 0823)   lactulose (CEPHULAC) Packet 20 g (20 g Oral Given 4/22/21 0815)   Daily 2 GRAM acetaminophen limit, unless fulminent liver failure or transaminases greater than or equal to 300 - 400, then none (has no administration in time range)   lactulose (CHRONULAC) solution 20 g (has no administration in time range)     Or   lactulose (CHRONULAC) solution for enema prep 100 g (has no administration in time range)   cefTRIAXone (ROCEPHIN) 2 g vial to attach to  ml bag for ADULTS or NS 50 ml bag for PEDS (0 g Intravenous Stopped 4/22/21 0900)   glucose gel 15-30 g (has no administration in time range)     Or   dextrose 50 % injection 25-50 mL (has no administration in time range)     Or   glucagon injection 1 mg (has no administration in time range)   insulin aspart (NovoLOG) injection (RAPID ACTING) (0 Units Subcutaneous Not Given 4/22/21 1257)   insulin aspart (NovoLOG) injection (RAPID ACTING) (1 Units Subcutaneous Not Given 4/21/21 2244)   atorvastatin (LIPITOR) tablet 20 mg (20 mg Oral Given 4/22/21 0817)   carvedilol (COREG) tablet 3.125 mg (3.125 mg Oral Given 4/22/21 0817)   omeprazole (priLOSEC) CR capsule 20 mg (20 mg Oral Given 4/22/21 0818)   furosemide (LASIX) tablet 20 mg (20 mg Oral Given 4/22/21 0818)   azithromycin (ZITHROMAX) tablet 250  mg (250 mg Oral Given 4/22/21 0817)   spironolactone (ALDACTONE) tablet 25 mg (has no administration in time range)   lactulose (CHRONULAC) solution 20 g (20 g Oral Given 4/21/21 0759)   cefTRIAXone (ROCEPHIN) 2 g vial to attach to  ml bag for ADULTS or NS 50 ml bag for PEDS (0 g Intravenous Stopped 4/21/21 0845)   azithromycin (ZITHROMAX) 500 mg in sodium chloride 0.9 % 250 mL intermittent infusion (0 mg Intravenous Stopped 4/21/21 0956)   albumin human 25 % injection 50 g (0 g Intravenous Stopped 4/21/21 2244)       Drips running?  No      Home pump  No    Current LDAs  Peripheral IV 04/21/21 Anterior;Right Upper forearm (Active)   Site Assessment [facility] 04/21/21 0547   Line Status Infusing 04/21/21 0547   Dressing Intervention New dressing  04/21/21 0547   Phlebitis Scale 0-->no symptoms 04/21/21 0547   Infiltration Scale 0 04/21/21 0547   If infiltrated, was a Vesicant infusing? No 04/21/21 0547   Number of days: 1       Peripheral IV 04/21/21 Anterior;Left Upper forearm (Active)   Site Assessment [facility] 04/21/21 0547   Dressing Intervention New dressing  04/21/21 0547   Phlebitis Scale 0-->no symptoms 04/21/21 0547   Infiltration Scale 0 04/21/21 0547   Infiltration Site Treatment Method  None 04/21/21 0547   If infiltrated, was a Vesicant infusing? No 04/21/21 0547   Number of days: 1       Peripheral IV 04/21/21 Right Wrist (Active)   Site Assessment [facility] 04/21/21 0643   Line Status Saline locked 04/21/21 0643   Number of days: 1       Wound 08/30/20 Lip Other (comment) bit lip (Active)   Number of days: 235       Wound Left Arm Other (comment) bruise lump  (Active)   Number of days:        Labs results:   Labs Ordered and Resulted from Time of ED Arrival Up to the Time of Departure from the ED   CBC WITH PLATELETS DIFFERENTIAL - Abnormal; Notable for the following components:       Result Value    WBC 2.7 (*)     RBC Count 3.91 (*)     Hemoglobin 12.2 (*)     Hematocrit 35.4 (*)     RDW 15.9 (*)     Platelet  Count 61 (*)     Absolute Neutrophil 1.5 (*)     Absolute Lymphocytes 0.6 (*)     All other components within normal limits   COMPREHENSIVE METABOLIC PANEL - Abnormal; Notable for the following components:    Chloride 115 (*)     Carbon Dioxide 18 (*)     Glucose 122 (*)     Albumin 2.8 (*)     Protein Total 6.5 (*)     All other components within normal limits   AMMONIA - Abnormal; Notable for the following components:    Ammonia 161 (*)     All other components within normal limits   INR - Abnormal; Notable for the following components:    INR 1.53 (*)     All other components within normal limits   ROUTINE UA WITH MICROSCOPIC REFLEX TO CULTURE - Abnormal; Notable for the following components:    Blood Urine Trace (*)     Protein Albumin Urine 20 (*)     Urobilinogen mg/dL 4.0 (*)     RBC Urine 13 (*)     Mucous Urine Present (*)     All other components within normal limits   GLUCOSE BY METER - Abnormal; Notable for the following components:    Glucose 124 (*)     All other components within normal limits   CBC WITH PLATELETS DIFFERENTIAL - Abnormal; Notable for the following components:    WBC 3.2 (*)     RBC Count 3.69 (*)     Hemoglobin 11.2 (*)     Hematocrit 34.2 (*)     RDW 16.2 (*)     Platelet Count 54 (*)     Absolute Lymphocytes 0.4 (*)     All other components within normal limits   COMPREHENSIVE METABOLIC PANEL - Abnormal; Notable for the following components:    Chloride 118 (*)     Carbon Dioxide 17 (*)     Glucose 107 (*)     Bilirubin Total 1.4 (*)     Albumin 2.6 (*)     Protein Total 6.7 (*)     All other components within normal limits   FIBRINOGEN ACTIVITY - Abnormal; Notable for the following components:    Fibrinogen 147 (*)     All other components within normal limits   LACTIC ACID WHOLE BLOOD - Abnormal; Notable for the following components:    Lactic Acid 2.3 (*)     All other components within normal limits   LACTIC ACID WHOLE BLOOD - Abnormal; Notable for the following components:     Lactic Acid 2.1 (*)     All other components within normal limits   GLUCOSE BY METER - Abnormal; Notable for the following components:    Glucose 104 (*)     All other components within normal limits   CBC WITH PLATELETS DIFFERENTIAL - Abnormal; Notable for the following components:    WBC 1.9 (*)     RBC Count 3.20 (*)     Hemoglobin 9.9 (*)     Hematocrit 30.1 (*)     RDW 16.3 (*)     Platelet Count 39 (*)     Absolute Neutrophil 1.1 (*)     Absolute Lymphocytes 0.3 (*)     All other components within normal limits   COMPREHENSIVE METABOLIC PANEL - Abnormal; Notable for the following components:    Chloride 118 (*)     Carbon Dioxide 18 (*)     Bilirubin Total 1.8 (*)     Albumin 3.0 (*)     Protein Total 6.2 (*)     All other components within normal limits   INR - Abnormal; Notable for the following components:    INR 1.83 (*)     All other components within normal limits   GLUCOSE BY METER - Abnormal; Notable for the following components:    Glucose 123 (*)     All other components within normal limits   LIPASE   TROPONIN I   INFLUENZA A/B & SARS-COV2 PCR MULTIPLEX   MAGNESIUM   PHOSPHORUS   GLUCOSE MONITOR NURSING POCT   HEMOGLOBIN A1C   GLUCOSE MONITOR NURSING POCT   GLUCOSE MONITOR NURSING POCT   GLUCOSE MONITOR NURSING POCT   GLUCOSE MONITOR NURSING POCT   MAGNESIUM   PHOSPHORUS   GLUCOSE BY METER   LACTATE FOR SEPSIS PROTOCOL   GLUCOSE MONITOR NURSING POCT   PERIPHERAL IV CATHETER   ASSESS FOR HYPOGLYCEMIA SYMPTOMS   IP ASSIGN PROVIDER TEAM TO TREATMENT TEAM   PERIPHERAL IV CATHETER   VITAL SIGNS   PULSE OXIMETRY NURSING   PAIN ASSESSMENT   STRICT INTAKE AND OUTPUT   NURSE INITIATED LACTULOSE PROTOCOL   ASSESS   TELEMETRY MONITORING MED/SURG   IP CARBOHYDRATE COUNTING BY NURSING   PATIENT EDUCATION   NOTIFY PHYSICIAN   INTAKE AND OUTPUT   INCENTIVE SPIROMETRY NURSING   APPLY PNEUMATIC COMPRESSION DEVICE (PCD)   BEDSIDE ATTENDANT   DAILY WEIGHTS   BLOOD CULTURE   BLOOD CULTURE       Imaging Studies: No  "results found for this or any previous visit (from the past 24 hour(s)).    Recent vital signs:   /54   Pulse 65   Temp 98.4  F (36.9  C) (Oral)   Resp 25   Ht 1.727 m (5' 8\")   Wt 85.7 kg (189 lb)   SpO2 95%   BMI 28.74 kg/m      Ryann Coma Scale Score: 15 (04/22/21 0826)       Cardiac Rhythm:   Pt needs tele? No  Skin/wound Issues: None    Code Status: Full Code    Pain control: pt had none    Nausea control: pt had none    Abnormal labs/tests/findings requiring intervention:     Family present during ED course? YEs. Wife Krystal here yesterday.   Family Comments/Social Situation comments:     Tasks needing completion: None    NAMRATA PONCE RN  ascom -- [phone]      "

## 2021-04-22 NOTE — PROGRESS NOTES
[facility] [place]    Medicine Progress Note - Hospitalist Service, Gold 11       Date of Admission:  4/21/2021  Assessment & Plan      Decompensated ALEXANDER cirrhosis  Hepatic encephalopathy  Hx SBP  Presented with 2-3 day history of confusion and falls per wife and daughter. Encephalopathic on admission with elevated ammonia and has improved now with lactulose. Has NAFLD and follows with [facility]. Has probable pneumonia and started on ceftriaxone and azithromycin. Other infectious w/u negative and no ascites for diagnostic paracentesis but does have hx SBP. Scheduled for TIPS in May with [facility].  -GI consultation appreciated.   -Abd US with dopplers.  -Treat CAP as below.  -Lactulose titrated to bowel movements; new medication as was not on PTA.  -Follow infectious studies.  -Continue diuretics.     Lactic acidosis, resolved  -Likely from infection, encephalopathy, liver disease.    Community acquired pneumonia, likely  -Likely based on imaging.  -Ceftriaxone and azithro x 5d.    Falls  Had 3 falls at home over the past few days in setting of confusion. CT Head and C-spine negative for acute pathology.  -Treatment of hepatic encephalopathy as above.  -PT/OT.     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  -Would recommend ENT follow-up - could be done inpatient vs outpatient pending hospital course.      Chronic pancytopenia  WBC 3.2, Hgb 11.2, Plts 54K. At baseline and likely from liver disease. No signs or symptoms of bleeding.  -Daily CBC.     Hx of recurrent right pleural effusion  Follows with Pulmonology Dr. Payne - last visit 3/8/21 - thought related to cirrhosis/portal HTN. Had thoracentesis 4/9 with 500cc clear fluid removed with cytology negative for malignancy with reactive reactive mesothelial cells and lymphocytes. Pleural effusion thought 2/2 possible hepatic hydrothorax. CXR on admission with minimal R  pleural fluid.   -Continue diuretics and continue to treat decompensated cirrhosis and PNA.     Atrial fibrillation  HTN  HLD  Afib is new from 10/2020. On Carvedilol 3.125mg BID. Not on ASA or anticoagluation. CHADSVASC 4 (age, HTN, DM).   - Telemetry.  - Continue PTA Carvedilol 3.125mg BID.  - Continue Atorvastatin 20mg daily.  - Long term AC risks likely outweigh benefits in his case; he can and should continue discussions with PCP on discharge.     Prolonged QTc  QTc 491. Will continue Azithromycin - however continue to monitor.   - Avoid QTc prolonging medications as able.      Splenic artery aneurysm  Found incidentally on CT Chest from 3/16 - 2.5cm splenic artery aneurysm at splenic hilum.        Diet: Combination Diet 5811-2238 Calories: Moderate Consistent CHO (4-6 CHO units/meal); 2 gm NA Diet    DVT Prophylaxis: Pneumatic Compression Devices  [place] Catheter: not present  Code Status: Full Code           Disposition Plan   Expected discharge: 2 - 3 days, recommended to TCU vs home once improvement in ecephalopathy and decompensated cirrhosis.  Entered: Damian Bowens MD 04/22/2021, 1:13 PM       The patient's care was discussed with the Patient, Patient's Family and GI Consultant.    Damian Bowens MD  Hospitalist Service, [address]  Contact information available via [facility] Paging/Directory  Please see sign in/sign out for up to date coverage information  ______________________________________________________________________    Interval History   Feeling ok this morning. Denies pain. Denies abdominal pain. Having bowel movements on commode. Endorses appetite and asked for apple juice. Denies nausea, vomiting, fevers, chills, shortness of breath.    Data reviewed today: I reviewed all medications, new labs and imaging results over the last 24 hours. I personally reviewed the EKG tracing showing atrial fibrillation and the chest x-ray image(s) showing  infiltrates in both lungs c/w pneumonia.    Physical Exam   Vital Signs: Temp: 98.4  F (36.9  C) Temp src: Oral BP: 127/54 Pulse: 65   Resp: 25 SpO2: 95 % O2 Device: None (Room air)    Weight: 189 lbs 0 oz  Exam  Gen: awake and alert, appears comfortable, appears stated age  HEENT: NCAT, sclerae anicteric  CV: irregularly irregular rhythm, rate normal, extremities warm and well perfused, trace lower extremity edema  Pulm: normal work of breathing, lungs clear to auscultation, no crackles or wheezing  GI: Nontender, nondistended, obese abdomen  Skin: warm, no jaundice, ecchymoses on right hand and scab on right forearm  Neuro: Alert and oriented to person, place, time, speech normal, moves all extremities symmetrically and equally  Psych: mood is good, affect is congruent      Data

## 2021-04-22 NOTE — PROCEDURES
"Brief procedure note. Paracentesis not attempted due to lack of ascites. All 4 quadrants examined with ultrasound and images stored. Minimal < 1cm ascites not safe to tap. Last one was \"weeks\" ago and they got 1/2 liter per patient.    Thank  You for consult.    GLORIA TINAJERO MD, [facility]  Internal Medicine Hospitalist & Staff Physician  [facility]  Pager: [phone]  [email or web address]  April 22, 2021    "

## 2021-04-22 NOTE — PHARMACY-ADMISSION MEDICATION HISTORY
Admission Medication History Completed by Pharmacy    See [facility] Admission Navigator for allergy information, preferred outpatient pharmacy, prior to admission medications and immunization status.     Medication History Sources:     Wife Krystal at [phone]    Rx refill hx    Care Everywhere records    Changes made to PTA medication list (reason):    Added:  Ferrous Sulfate    Deleted: None    Changed:   - Furosemide 10mg daily ---> 20mg daily  - Spironolactone 25mg daily ---> 100mg daily    Additional Information:    None    Prior to Admission medications    Medication Sig Last Dose Taking? Auth Provider   atorvastatin (LIPITOR) 20 MG tablet Take 20 mg by mouth every morning  4/20/2021 Yes Unknown, Entered By History   carvedilol (COREG) 3.125 MG tablet Take 3.125 mg by mouth 2 times daily (with meals) 4/20/2021 Yes Unknown, Entered By History   ferrous sulfate (FE TABS) 325 (65 Fe) MG EC tablet Take 325 mg by mouth 2 times daily 4/20/2021 Yes Unknown, Entered By History   furosemide (LASIX) 40 MG tablet Take 20 mg by mouth daily (1/2 of 40mg tabs) 4/20/2021 Yes Unknown, Entered By History   insulin glargine (LANTUS PEN) 100 UNIT/ML pen Inject 16 Units Subcutaneous every morning   Yes Shankar Fair MD   metFORMIN (GLUCOPHAGE) 500 MG tablet Take 500 mg by mouth 2 times daily (with meals) 4/20/2021 Yes Unknown, Entered By History   omeprazole (PRILOSEC) 20 MG DR capsule Take 20 mg by mouth 2 times daily 4/20/2021 Yes Unknown, Entered By History   spironolactone (ALDACTONE) 100 MG tablet Take 100 mg by mouth daily 4/20/2021 Yes Unknown, Entered By History   dicyclomine (BENTYL) 20 MG tablet Take 20 mg by mouth 2 times daily 4/20/2021  Unknown, Entered By History       Date completed: 04/22/21    Medication history completed by:    Carina Trevino, PharmD  April 22, 2021

## 2021-04-22 NOTE — CONSULTS
Care Management Initial Consult    General Information  Assessment completed with: Spouse or significant other, Krystal  Type of CM/SW Visit: Initial Assessment    Primary Care Provider verified and updated as needed: Yes   Readmission within the last 30 days: no previous admission in last 30 days      Reason for Consult: discharge planning  Advance Care Planning: Advance Care Planning Reviewed: questions answered          Communication Assessment  Patient's communication style: spoken language (English or Bilingual)    Hearing Difficulty or Deaf: no   Wear Glasses or Blind: no    Cognitive  Cognitive/Neuro/Behavioral: WDL  Level of Consciousness: alert  Arousal Level: opens eyes spontaneously  Orientation: disoriented to, situation  Mood/Behavior: cooperative, calm  Best Language: 0 - No aphasia  Speech: clear, spontaneous, logical    Pt is on a 1:1.    Living Environment:   People in home: spouse, child(keith), adult  Krystal (spouse); Korin (daughter)  Current living Arrangements: house      Able to return to prior arrangements: other (see comments)  Living Arrangement Comments: TBD if pt is appropriate to return to detention pending PT eval and cognitive status    Family/Social Support:  Care provided by: self, spouse/significant other, child(keith)  Provides care for: no one, unable/limited ability to care for self  Marital Status:   Wife, Children  Krystal       Description of Support System: Supportive, Involved    Support Assessment: Adequate family and caregiver support, Adequate social supports    Current Resources:   Patient receiving home care services: No     Community Resources: None  Equipment currently used at home: walker, rolling  Supplies currently used at home: None    Employment/Financial:  Employment Status: Not assessed     Financial Concerns: No concerns identified   Referral to Financial Counselor: No       Lifestyle & Psychosocial Needs:        Socioeconomic History     Marital status:      " Spouse name: Not on file     Number of children: Not on file     Years of education: Not on file     Highest education level: Not on file     Tobacco Use     Smoking status: Former Smoker     Packs/day: 2.00     Years: 40.00     Pack years: 80.00     Start date: 1957     Quit date: 2007     Years since quittin.9     Smokeless tobacco: Never Used       Functional Status:  Prior to admission patient needed assistance:   Dependent ADLs:: Ambulation-no assistive device  Dependent IADLs:: Medication Management, Shopping, Transportation, Laundry, Cleaning  Assesssment of Functional Status: Not at baseline with mobility, Not at baseline with ADL Functioning    Mental Health Status:  Not assessed    Chemical Dependency Status:  Not assessed          Values/Beliefs:  Spiritual, Cultural Beliefs, Anabaptism Practices, Values that affect care: no          Values/Beliefs Comment: \"We believe in God.\"    Additional Information:  Chart reviewed. Case discussed with bedside RN and medical provider. Based on pt's current cognitive status, writer will complete the assessment with pt's spouse, Krystal.    Writer spoke with Krystal over the phone (P: [phone]). Writer introduced self, discussed role and went over writer's availability. Pt lives in a single level home with his wife and daughter, Korin. Korin works during the daytime hours but provides assistance in the evening and night. Spouse works a part time job (10 hours a week), but currently is on leave due to patient's health. Spouse reports she is working not out of financial need, but \"to get out of the house and have something to do\". Spouse denies having any financial concerns at this time. Spouse reports pt \"has not been himself for a good month.\" She reports pt has not been eating well and has been needing more assistance with ADLs that he has been typically independent with. Any ADLs pt needs help with, spouse and daughter will provide assistance for pt. "     Discussed with spouse how PT will evaluate pt and they may recommend TCU. Spouse reports she is okay with pt going to a TCU if recommend but is not certain if pt will be agreeable. In an effort not to overwhelm spouse with information, writer did not discuss SNF options or how [facility] SNF in-network list may limit potential SNF options. SW to f/u with spouse and pt to discuss SNF if appropriate and provide education as necessary.    KARLA Londono, [facility] [place]  ED/Observation   [facility]  Phone: [phone]  Pager: [phone]  Fax: [phone]    On-call pager, [phone], 4:00pm to midnight

## 2021-04-23 NOTE — PROGRESS NOTES
[facility]    Medicine Progress Note - Hospitalist Service, Gold 11       Date of Admission:  4/21/2021  Assessment & Plan      Decompensated ALEXANDER cirrhosis  Hepatic encephalopathy  Hx SBP  Presented with 2-3 day history of confusion and falls per wife and daughter. Encephalopathic on admission with elevated ammonia and has improved now with lactulose. Has NAFLD and follows with [facility]. Has probable pneumonia and started on ceftriaxone and azithromycin. Other infectious w/u negative and no ascites for diagnostic paracentesis but does have hx SBP. Scheduled for TIPS in May with [facility].  -GI consultation appreciated.   -Abd US with dopplers does not show portal venous clot.  -Treat CAP as below.  -Lactulose titrated to bowel movements; new medication as was not on PTA.  -Follow infectious studies.  -Continue diuretics.  -Will talk with GI about prophylactic abx on discharge given hx SBP.     Lactic acidosis, resolved  -Likely from infection, encephalopathy, liver disease.    Community acquired pneumonia, likely  -Likely based on imaging.  -Cefdinir x5d, azithro x3d.    Falls  Had 3 falls at home over the past few days in setting of confusion. CT Head and C-spine negative for acute pathology.  -Treatment of hepatic encephalopathy as above.  -PT/OT; will have home PT on discharge.     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  -Would recommend ENT follow-up - could be done inpatient vs outpatient pending hospital course.      Chronic pancytopenia, stable  WBC 3.2, Hgb 11.2, Plts 54K. At baseline and likely from liver disease. No signs or symptoms of bleeding.  -Daily CBC.     Hx of recurrent right pleural effusion  Follows with Pulmonology Dr. Payne - last visit 3/8/21 - thought related to cirrhosis/portal HTN. Had thoracentesis 4/9 with 500cc clear fluid removed with cytology negative for malignancy with  reactive reactive mesothelial cells and lymphocytes. Pleural effusion thought 2/2 possible hepatic hydrothorax. CXR on admission with minimal R pleural fluid.   -Continue diuretics and continue to treat decompensated cirrhosis and PNA.     Atrial fibrillation  HTN  HLD  Afib is new from 10/2020. On Carvedilol 3.125mg BID. Not on ASA or anticoagluation. CHADSVASC 4 (age, HTN, DM).   - Telemetry.  - Continue PTA Carvedilol 3.125mg BID.  - Continue Atorvastatin 20mg daily.  - Long term AC risks likely outweigh benefits in his case; he can and should continue discussions with PCP on discharge.     Prolonged QTc  QTc 491. Will continue Azithromycin - however continue to monitor.   - Avoid QTc prolonging medications as able.      Splenic artery aneurysm  Found incidentally on CT Chest from 3/16 - 2.5cm splenic artery aneurysm at splenic hilum.        Diet: Combination Diet 0018-8116 Calories: Moderate Consistent CHO (4-6 CHO units/meal); 2 gm NA Diet    DVT Prophylaxis: Pneumatic Compression Devices  [place] Catheter: not present  Code Status: Full Code           Disposition Plan   Expected discharge: Tomorrow, recommended to prior living arrangement once improvement in ecephalopathy and decompensated cirrhosis.  Entered: Damian Bowens MD 04/23/2021, 2:03 PM       The patient's care was discussed with the Bedside Nurse, Patient, Patient's Family and GI Consultant.    Damian Bowens MD  Hospitalist Service, [address]  Contact information available via [facility] Paging/Directory  Please see sign in/sign out for up to date coverage information  ______________________________________________________________________    Interval History   Feeling ok this morning. Wants to go home today. Denies abdominal pain, nausea, vomiting. Has been having good stool output with lactulose. Updated patient's wife on phone today.    Data reviewed today: I reviewed all medications, new labs and  imaging results over the last 24 hours. I personally reviewed the EKG tracing showing atrial fibrillation and the chest x-ray image(s) showing infiltrates in both lungs c/w pneumonia.    Physical Exam   Vital Signs: Temp: 97.2  F (36.2  C) Temp src: Oral BP: 116/64 Pulse: 73   Resp: 18 SpO2: 97 % O2 Device: None (Room air)    Weight: 169 lbs 0 oz  Exam  Gen: awake and alert, appears comfortable, appears stated age  HEENT: NCAT, sclerae anicteric  CV: irregularly irregular rhythm, rate normal, extremities warm and well perfused, trace lower extremity edema   Pulm: normal work of breathing, lungs clear to auscultation, no crackles or wheezing  GI: Nontender, nondistended, obese abdomen  Skin: warm, no jaundice, ecchymoses on right hand and scab on right forearm  Neuro: Alert and oriented to person, place, time, speech normal, moves all extremities symmetrically and equally  Psych: mood is good, affect is congruent      Data

## 2021-04-23 NOTE — PLAN OF CARE
Time: 1900-0730    Reason for admission: AMS, fall    A&Ox4, forgetful. Bed alarm on. VSS on RA. Tele reads afib. Pt denies pain. Pt refused PM dose of lactulose. Pt is at goal BM with 4 BMs on 4/22. BG stable. Platelets 39. LA 1.4.     Plan: Hepatology following. PT/OT following. Will continue to monitor.

## 2021-04-23 NOTE — PROGRESS NOTES
Care Management Follow Up and Discharge Plan    Length of Stay (days): 2    Expected Discharge Date: 04/24/21     Concerns to be Addressed:  None, all concerns addressed this encounter     Patient plan of care discussed at interdisciplinary rounds: Yes    Anticipated Discharge Disposition:  Return home (lives with spouse and adult daughter)     Anticipated Discharge Services:  [facility]   Therapy eval rec's: will benefit from assist at home for safey with bath transfers as well as HH PT/OT for safety evaluation in the home setting. May benefit from use of 4WW (pt already owns) for improved stability with ambulation    Anticipated Discharge DME:  None new recommended    Patient/family educated on Medicare website which has current agencies and service quality ratings:  Yes; also discussed that agencies who accept [employer] for in-network benefits are: [facility], [facility] and [facility]. Patient is agreeable PT & OT, does not feel he needs a nurse, wants referral sent to [facility] (completed)    Education Provided on the Discharge Plan:  yes    Patient/Family in Agreement with the Plan: yes    Referrals Placed by CM:  Yes    Private pay costs discussed: Not applicable    Additional Information:    [facility]  [place] Office  Ph: [phone]  Fax: [phone]    PT to eval and treat, home safety eval  OT to eval and treat, home safety eval        Maria Elena Holland RN, BSN, PHN  Care Coordinator  [facility]  Direct phone: [phone]  Pager: [phone]    To contact the on-call Weekend Care Coordination Team please page [phone]

## 2021-04-23 NOTE — PROGRESS NOTES
04/23/21 0931   Quick Adds   Type of Visit Initial PT Evaluation   Living Environment   People in home spouse;child(keith), adult   Current Living Arrangements house   Home Accessibility stairs to enter home   Number of Stairs, Main Entrance 3   Stair Railings, Main Entrance railing on left side (ascending)   Transportation Anticipated car, drives self;family or friend will provide   Living Environment Comments pt lives with his wife and daughter in a single level home. reports his daughter works during the day but assists at night if needed, also reports his spouse is capable of providing assistance during the day. reports fall occured while getting out of the shower and he slipped inside the shower - reports he plans to install a grab bar and change the bath mat.    Self-Care   Usual Activity Tolerance good   Current Activity Tolerance moderate   Regular Exercise Yes   Activity/Exercise Type walking   Exercise Amount/Frequency 3-5 times/wk   Equipment Currently Used at Home   (owns a 4WW)   Activity/Exercise/Self-Care Comment pt reports he typically walks 3x/week in the neighborhood for exercise.    Disability/Function   Hearing Difficulty or Deaf no   Wear Glasses or Blind yes   Vision Management glasses   Concentrating, Remembering or Making Decisions Difficulty yes   Concentration Management pt denies concerns however chart indicates hx of dementia   Difficulty Communicating no   Difficulty Eating/Swallowing no   Walking or Climbing Stairs Difficulty no   Dressing/Bathing Difficulty no   Toileting issues no   Doing Errands Independently Difficulty (such as shopping) yes   Errands Management family assists   Fall history within last six months yes   Number of times patient has fallen within last six months 1  (while getting out of the bathtub)   Change in Functional Status Since Onset of Current Illness/Injury yes   General Information   Onset of Illness/Injury or Date of Surgery 04/21/21   Referring Physician  "Emily Lane PA-C   Patient/Family Therapy Goals Statement (PT) pt states \"I want to feel a little better about walking around, more stable.'    Pertinent History of Current Problem (include personal factors and/or comorbidities that impact the POC) Ramirez Leslie is a 76 year old male with a history 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 Admitted to medicine for further management   Existing Precautions/Restrictions fall   General Observations activity: up with assist   Cognition   Orientation Status (Cognition) oriented x 4   Cognitive Status Comments pt is alert and oriented to person, place, time and situation   Pain Assessment   Patient Currently in Pain No   Integumentary/Edema   Integumentary/Edema no deficits were identifed   Posture    Posture Forward head position;Protracted shoulders   Range of Motion (ROM)   ROM Quick Adds ROM WFL   Strength   Manual Muscle Testing Quick Adds Strength WFL   Bed Mobility   Comment (Bed Mobility) IND   Transfers   Transfer Safety Comments SBA sit <> stand without device, steady on feet   Gait/Stairs (Locomotion)   Comment (Gait/Stairs) amb x 20' without device and SBA, initially with minor LOB during turning however improves with distance. discussed use of 4WW at home for stability.    Balance   Balance Comments SBA without a device, intermittently reaching for UE support with dynamic tasks.    Sensory Examination   Sensory Perception Comments denies deficits   Clinical Impression   Criteria for Skilled Therapeutic Intervention yes, treatment indicated   PT Diagnosis (PT) impaired functional mobility   Influenced by the following impairments balance, weakness, fatigue   Functional limitations due to impairments below baseline performance and " tolerance of functional mobility   Clinical Presentation Stable/Uncomplicated   Clinical Presentation Rationale pt presentation, clinical reasoning   Clinical Decision Making (Complexity) low complexity   Therapy Frequency (PT) 5x/week   Predicted Duration of Therapy Intervention (days/wks) 1 week   Planned Therapy Interventions (PT) balance training;bed mobility training;gait training;home exercise program;neuromuscular re-education;stair training;transfer training;patient/family education   Anticipated Equipment Needs at Discharge (PT)   (grab bar, shower mat)   Risk & Benefits of therapy have been explained evaluation/treatment results reviewed;care plan/treatment goals reviewed;risks/benefits reviewed;participants included;patient   Clinical Impression Comments pt presents with mild balance impairments and general deconditioning however mobilizing well with SBA. will benefit from skilled PT to address deficits   PT Discharge Planning    PT Discharge Recommendation (DC Rec) home with assist;home with home care physical therapy   PT Rationale for DC Rec pt will benefit from assist at home for safey with bath transfers as well as  PT/OT for safety evaluation in the home setting. May benefit from use of 4WW (pt already owns) for improved stability with ambulation   PT Brief overview of current status  SBA with gait belt   Total Evaluation Time   Total Evaluation Time (Minutes) 8

## 2021-04-23 NOTE — PROGRESS NOTES
04/23/21 1530   Quick Adds   Type of Visit Initial Occupational Therapy Evaluation   Living Environment   People in home spouse;child(keith), adult   Current Living Arrangements house   Home Accessibility stairs to enter home   Number of Stairs, Main Entrance 3   Stair Railings, Main Entrance railing on left side (ascending)   Transportation Anticipated car, drives self;family or friend will provide   Living Environment Comments Pt lives in a house w/ his wife and adult daughter. Pt has a    Self-Care   Usual Activity Tolerance good   Current Activity Tolerance moderate   Regular Exercise Yes   Activity/Exercise Type walking  (gardening/yardwork)   Exercise Amount/Frequency 3-5 times/wk   Equipment Currently Used at Home walker, rolling;grab bar, tub/shower   Activity/Exercise/Self-Care Comment Pt reporting he is IND w/ ADLs at baseline, has one grab bar in his tub and son-in-law is installing a second bar for entering/exiting. Pt report he uses his 4WW for long distances only. Pt enjoys flower gardening.    Disability/Function   Hearing Difficulty or Deaf no   Wear Glasses or Blind yes   Vision Management glasses   Concentrating, Remembering or Making Decisions Difficulty yes   Concentration Management Pt mildy tangential during evaluation. Per chart hx dementia   Difficulty Communicating no   Difficulty Eating/Swallowing no   Walking or Climbing Stairs Difficulty no   Dressing/Bathing Difficulty no   Toileting issues no   Doing Errands Independently Difficulty (such as shopping) yes   Errands Management family provide assist   Fall history within last six months yes   Number of times patient has fallen within last six months 1   Change in Functional Status Since Onset of Current Illness/Injury yes   General Information   Onset of Illness/Injury or Date of Surgery 04/21/21   Referring Physician Emily Lane PA-C   Patient/Family Therapy Goal Statement (OT) To go home   Additional Occupational Profile  "Info/Pertinent History of Current Problem \"Ramirez Leslie is a 76 year old male with a history 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 Admitted to medicine for further management.\"   Existing Precautions/Restrictions fall   Left Upper Extremity (Weight-bearing Status) full weight-bearing (FWB)   Right Upper Extremity (Weight-bearing Status) full weight-bearing (FWB)   Left Lower Extremity (Weight-bearing Status) full weight-bearing (FWB)   Right Lower Extremity (Weight-bearing Status) full weight-bearing (FWB)   General Observations and Info Pt encountered sleeping, woke readily, pleasant and agreeable to therapy.    Cognitive Status Examination   Orientation Status orientation to person, place and time  (oriented to year and month)   Follows Commands follows two-step commands;75-90% accuracy;verbal cues/prompting required   Safety Deficit minimal deficit;awareness of need for assistance   Memory Deficit   (Will further assess)   Attention Deficit minimal deficit;distractible in noisy environment;alternating attention   Executive Function Deficit minimal deficit;insight/awareness of deficits;judgment   Cognitive Status Comments Pt was generally oriented and following multi-step cues with Min repetition. Pt mildly tangential and seeming goofy- unsure if this is pt's sense of humor or 2/2 cognition.   Visual Perception   Visual Impairment/Limitations WFL   Impact of Vision Impairment on Function (Vision) Pt reports no concerns. Vision appears WFL during ADLs and ambulation.   Sensory   Sensory Quick Adds No deficits were identified   Pain Assessment   Patient Currently in Pain No   Integumentary/Edema   Integumentary/Edema no deficits were identifed   Range of Motion Comprehensive   General Range of " "Motion no range of motion deficits identified   Strength Comprehensive (MMT)   General Manual Muscle Testing (MMT) Assessment no strength deficits identified   Coordination   Upper Extremity Coordination No deficits were identified   Bed Mobility   Bed Mobility supine-sit;sit-supine   Supine-Sit [place] (Bed Mobility) modified independence   Sit-Supine [place] (Bed Mobility) set up  (Assist to manage bedding)   Assistive Device (Bed Mobility) bed rails   Transfers   Transfers sit-stand transfer   Sit-Stand Transfer   Sit-Stand [place] (Transfers) supervision   Balance   Balance Comments Pt initially unsteady during ambulation, progressing to SBA w/o AD when ambulating x280 ft in knott.   Activities of Daily Living   BADL Assessment/Intervention grooming;feeding;toileting   Grooming Assessment/Training   [place] Level (Grooming) supervision;minimum assist (75% patient effort)   Comment (Grooming) SBA for G/H while standing at sink. Pt demonstrating acurately sequencing and task initiation/completion. Brief Min A provided to untangle hair at back of head.   Eating/Self Feeding   [place] Level (Feeding) independent   Comment (Feeding) While standing, pt IND opened straw and used to take sips from cup.   Toileting   [place] Level (Toileting) supervision   Comment (Toileting) For urinating while standing. Pt demonstrating good balance.   Instrumental Activities of Daily Living (IADL)   Previous Responsibilities yardwork;medication management;meal prep   IADL Comments Pt reports he \"reheats\" instead of cooks. Pt likes to garden and complete yardwork. Pt initially reports he manages his medications IND using pillbox, then says his youngest daughter supervises/double checks his set up.   Clinical Impression   Criteria for Skilled Therapeutic Interventions Met (OT) yes;meets criteria   OT Diagnosis Impaired ADL performance   OT Problem List-Impairments impacting ADL activity tolerance " impaired;cognition   Assessment of Occupational Performance 1-3 Performance Deficits   Identified Performance Deficits medication management, meal prep, yardwork   Planned Therapy Interventions (OT) ADL retraining;IADL retraining;strengthening;stretching;home program guidelines;progressive activity/exercise   Clinical Decision Making Complexity (OT) low complexity   Therapy Frequency (OT) 5x/week   Predicted Duration of Therapy x2 days   Anticipated Equipment Needs Upon Discharge (OT)   (TBD)   Risk & Benefits of therapy have been explained evaluation/treatment results reviewed;care plan/treatment goals reviewed;risks/benefits reviewed;participants voiced agreement with care plan;participants included;patient   Comment-Clinical Impression Pt presents today near ADL baseline. Pt will benefit from continued IP to further assess pt's functional cognition and to promote strength and endurance for ADL/IADL performance.    OT Discharge Planning    OT Discharge Recommendation (DC Rec) Home with assist;home with home care occupational therapy   OT Rationale for DC Rec Pt is near ADL baseline, has good assist from family. Pt will benefit from [facility] OT for home safety assessment and to promote ADL/IADL independence and safety in the home environment.   OT Brief overview of current status  SBA for ADLs and ambulation   Total Evaluation Time (Minutes)   Total Evaluation Time (Minutes) 5

## 2021-04-23 NOTE — PLAN OF CARE
Time: 3649-2573     Reason for admission: hepatic encelopathy, elevated ammonia  Vitals: VSS on RA  Activity: SBA in room  Pain: denies pain   Neuro: A&Ox4, forgetful. Bed alarm on for safety/fall history.    Cardiac: on tele, NSR  Respiratory: WDL  GI/: voiding spontaneously  Diet: regular diet, good appetite  Lines: 2 PIV SL  Skin: scattered bruising, intact  Labs: lytes WDL, WBC 2.1, platelet count 45, BG 97, 161 insulin given per MAR  New this shift: pt frustrated at start of shift wanting to go home. Declined AM lactulose and IV abx.  MD decreased Lactulose dose and frequency, pt amenable to taking.  Switched to PO abx.  Plan: home with home PT/OT likely 4/24.  Will continue to monitor and follow POC

## 2021-04-24 NOTE — DISCHARGE SUMMARY
[facility]  Hospitalist Discharge Summary      Date of Admission:  4/21/2021  Date of Discharge:  4/24/2021  Discharging Provider: Damian Bowens MD  Discharge Team: Hospitalist Service, Gold 11    Discharge Diagnoses   Decompensated ALEXANDER cirrhosis  Hepatic encephalopathy  Hx SBP  Lactic acidosis, resolved  Community acquired pneumonia, likely  Falls  Chronic right maxillary sinus disease with polyp and mucous retention cyst  Hx of recurrent right pleural effusion  Chronic pancytopenia, stable  Atrial fibrillation  HTN  HLD  Prolonged QTc  Splenic artery aneurysm    Follow-ups Needed After Discharge   Follow-up Appointments     Follow Up and recommended labs and tests      1. Follow up with your liver doctors at [facility] 1-2 weeks after discharge.               Unresulted Labs Ordered in the Past 30 Days of this Admission     Date and Time Order Name Status Description    4/21/2021 0710 Blood culture Preliminary     4/21/2021 0710 Blood culture Preliminary       These results will be followed up by myself, PCP    Discharge Disposition   Discharged to home  Condition at discharge: Stable    Hospital Course   This is a 76 year old man with ALEXANDER cirrhosis admitted with decompensated cirrhosis, hepatic encephalopathy and pneumonia. The etiology of his decompensation is likely the pneumonia. He was started on antibiotics and lactulose with improvement in the encephalopathy. He was seen by our hepatology team and was continued on diuretic therapy and discharged on lactulose which is a new medication for discharge. He will complete 5 days of antibiotics for pneumonia. Abdominal US did not show much ascites or portal clot. There was no other obvious source of infection.     He was seen by PT/OT who recommended home therapies.     He was instructed to follow up with [facility] as scheduled.       Consultations This Hospital Stay   PHYSICAL THERAPY ADULT IP CONSULT  OCCUPATIONAL THERAPY ADULT  IP CONSULT  INTERNAL MEDICINE PROCEDURE TEAM ADULT IP CONSULT [place] - PARACENTESIS  GI HEPATOLOGY ADULT IP CONSULT  PHARMACY IP CONSULT  CARE MANAGEMENT / SOCIAL WORK IP CONSULT    Code Status   Full Code    Time Spent on this Encounter   IDamian MD, personally saw the patient today and spent greater than 30 minutes discharging this patient.       Damian Bowens MD  [facility] [address] [place]  [address] [place] [address]  Phone: [phone]  ______________________________________________________________________    Physical Exam   Vital Signs: Temp: 97.2  F (36.2  C) Temp src: Axillary BP: 124/64 Pulse: 72   Resp: 18 SpO2: 94 % O2 Device: None (Room air)    Weight: 169 lbs 0 oz  Exam  Gen: awake and alert, appears comfortable, appears stated age  HEENT: NCAT, sclerae anicteric  CV: RRR, S1/S2, extremities warm and well perfused, no lower extremity edema  Pulm: normal work of breathing, lungs clear to auscultation, no crackles or wheezing  GI: Nontender, nondistended  Skin: warm, no jaundice  Neuro: Aox3, speech normal, moves all extremities symmetrically and equally  Psych: mood is good, affect is congruent         Primary Care Physician   TORSTEN OJEDA    Discharge Orders      Home Care PT Referral for Hospital Discharge      Home Care OT Referral for Hospital Discharge      MD face to face encounter    Documentation of Face to Face and Certification for Home Health Services    I certify that patient: Ramirez Leslie is under my care and that I, or a nurse practitioner or physician's assistant working with me, had a face-to-face encounter that meets the physician face-to-face encounter requirements with this patient on: 4/23/2021.    This encounter with the patient was in whole, or in part, for the following medical condition, which is the primary reason for home health care: 77 y/o with h/o non alcoholic cirrhosis and h/o SBP and recurrent pleural effusion admitted with AMS and  falls. Elevated ammonia and significant asterixis consistent with hepatic encephalopathy.     I certify that, based on my findings, the following services are medically necessary home health services: Occupational Therapy and Physical Therapy.    My clinical findings support the need for the above services because: Occupational Therapy Services are needed to assess and treat ADL safety due to fall at home resulting in hospitalization, home safety eval and recommendations are needed. Physical and Occupational Therapy Services would benefit patient at home for safey with bath transfers as well as HH PT/OT for safety evaluation in the home setting.    Further, I certify that my clinical findings support that this patient is homebound (i.e. absences from home require considerable and taxing effort and are for medical reasons or [employer] services or infrequently or of short duration when for other reasons) because: Leaving home is medically contraindicated for the following reason(s): Infection risk / immunocompromised state where it is safer for them to receive services in the home.    Based on the above findings. I certify that this patient is confined to the home and needs intermittent skilled nursing care, physical therapy and/or speech therapy.  The patient is under my care, and I have initiated the establishment of the plan of care.  This patient will be followed by a physician who will periodically review the plan of care.  Physician/Provider to provide follow up care: Richi Kay    Attending hospital physician (the Medicare certified [place] provider): Damian Bowens MD  Physician Signature: See electronic signature associated with these discharge orders.  Date: 4/23/2021     Reason for your hospital stay    You were in the hospital for confusion that was related to hepatic encephalopathy (elevated toxin levels like ammonia that can happen in patients with liver disease). You also were treated for a  pneumonia with some antibiotics. We had our liver team see you while you were in the hospital. We started lactulose which is a laxative medicine that is used to help your liver clear ammonia and toxins. You should continue this medicine after discharge.     Follow Up and recommended labs and tests    1. Follow up with your liver doctors at [facility] 1-2 weeks after discharge.     Activity    Your activity upon discharge: activity as tolerated     Full Code     Diet    Follow this diet upon discharge: Orders Placed This Encounter      Combination Diet 7099-2987 Calories: Moderate Consistent CHO (4-6 CHO units/meal); 2 gm NA Diet       Significant Results and Procedures   Most Recent 3 CBC's:  Recent Labs   Lab Test 04/24/21  0811 04/23/21  0718 04/22/21  0608   WBC 1.9* 2.1* 1.9*   HGB 10.1* 10.8* 9.9*   MCV 93 94 94   PLT 44* 45* 39*     Most Recent 3 BMP's:  Recent Labs   Lab Test 04/24/21  0811 04/23/21  0718 04/22/21  0608    142 144   POTASSIUM 4.0 3.7 3.5   CHLORIDE 111* 116* 118*   CO2 20 18* 18*   BUN 17 16 16   CR 0.85 0.88 0.82   ANIONGAP 10 8 8   ADIS 8.2* 8.6 8.6   GLC 85 97 90     Most Recent 2 LFT's:  Recent Labs   Lab Test 04/24/21  0811 04/23/21  0718   AST 38 38   ALT 25 26   ALKPHOS 99 104   BILITOTAL 1.5* 1.4*     Most Recent 3 INR's:  Recent Labs   Lab Test 04/22/21  0608 04/21/21  0601 02/09/21  0914   INR 1.83* 1.53* 1.49*       Discharge Medications   Discharge Medication List as of 4/24/2021  8:09 AM      START taking these medications    Details   cefdinir (OMNICEF) 300 MG capsule Take 1 capsule (300 mg) by mouth every 12 hours, Disp-3 capsule, R-0, E-Prescribe      lactulose (CEPHULAC) 10 GM packet Take 1 packet (10 g) by mouth 2 times daily, Disp-60 packet, R-1, E-Prescribe         CONTINUE these medications which have NOT CHANGED    Details   atorvastatin (LIPITOR) 20 MG tablet Take 20 mg by mouth every morning , Historical      carvedilol (COREG) 3.125 MG tablet Take 3.125 mg by mouth 2  times daily (with meals), Historical      ferrous sulfate (FE TABS) 325 (65 Fe) MG EC tablet Take 325 mg by mouth 2 times daily, Historical      furosemide (LASIX) 40 MG tablet Take 20 mg by mouth daily (1/2 of 40mg tabs), Historical      insulin glargine (LANTUS PEN) 100 UNIT/ML pen Inject 16 Units Subcutaneous every morning , HistoricalIf Lantus is not covered by insurance, may substitute Basaglar at same dose and frequency.        metFORMIN (GLUCOPHAGE) 500 MG tablet Take 500 mg by mouth 2 times daily (with meals), Historical      omeprazole (PRILOSEC) 20 MG DR capsule Take 20 mg by mouth 2 times daily, Historical      spironolactone (ALDACTONE) 100 MG tablet Take 100 mg by mouth daily, Historical      dicyclomine (BENTYL) 20 MG tablet Take 20 mg by mouth 2 times daily, Historical           Allergies   No Known Allergies

## 2021-04-24 NOTE — PLAN OF CARE
Physical Therapy Discharge Summary    Reason for therapy discharge:    Discharged to home with home therapy.    Progress towards therapy goal(s). See goals on Care Plan in [facility] electronic health record for goal details.  Goals not met.  Barriers to achieving goals:   discharge from facility.    Therapy recommendation(s):    Continued therapy is recommended.  Rationale/Recommendations:  home PT advised per evaluating PT to maximize status and safety.

## 2021-04-24 NOTE — PLAN OF CARE
Pt is alert and oriented. Up with SBA due to fall history. Denies pain or dyspnea. Had 3 more BM's overnight. Bed alarm is on but pt has called appropriately all night. VSS. Tele shows A-fib. Slept reasonably well.

## 2021-04-24 NOTE — PLAN OF CARE
Time: 6704-1544     Reason for admission: hepatic encelopathy, elevated ammonia  Vitals: VSS on RA  Activity: Ind in room  Pain: denies pain   Neuro: A&Ox4, forgetful. Bed alarm on for safety/fall history.    Cardiac: tele removed  Respiratory: WDL  GI/: voiding spontaneously  Diet: regular diet, good appetite  Lines: PIV removed  Skin: scattered bruising, intact  Labs: lytes WDL, WBC 1.9, platelet count 44, BG 85,  New this shift: pt discharge to home via ride from daughter.  PIVs removed. Discharge education provided.  Medications picked up from  pharmacy.

## 2021-04-24 NOTE — PLAN OF CARE
A&O (forgetful) VSS on RA. PIV's SL. Up to bathroom SBA (BM) goal met. Bed alarm on. Reg diet, good appetite. Calls to make needs known.

## 2021-04-24 NOTE — PLAN OF CARE
Occupational Therapy Discharge Summary    Reason for therapy discharge:    Discharged to home with home therapy.    Progress towards therapy goal(s). See goals on Care Plan in [facility] electronic health record for goal details.  Goals partially met.  Barriers to achieving goals:   discharge from facility.    Therapy recommendation(s):    Continued therapy is recommended.  Rationale/Recommendations:  Pt would benefit from further therapy to progress functional independence with all mobility and ADLs within home environment.

## 2021-04-26 NOTE — PROGRESS NOTES
[facility]: Post-Discharge Note  SITUATION                                                      Admission:    Admission Date: 04/21/21   Reason for Admission: Decompensated ALEXANDER cirrhosis  Discharge:   Discharge Date: 04/24/21  Discharge Diagnosis: Decompensated ALEXANDER cirrhosis    BACKGROUND                                                      This is a 76 year old man with ALEXANDER cirrhosis admitted with decompensated cirrhosis, hepatic encephalopathy and pneumonia. The etiology of his decompensation is likely the pneumonia. He was started on antibiotics and lactulose with improvement in the encephalopathy. He was seen by our hepatology team and was continued on diuretic therapy and discharged on lactulose which is a new medication for discharge. He will complete 5 days of antibiotics for pneumonia. Abdominal US did not show much ascites or portal clot. There was no other obvious source of infection.     ASSESSMENT      Discharge Assessment  Patient reports symptoms are: Improved  Does the patient have all of their medications?: Yes  Does patient know what their new medications are for?: Yes  Does patient have a follow-up appointment scheduled?: No(wife calling today)  Does patient have any other questions or concerns?: No    Post-op  Did the patient have surgery or a procedure: No  Fever: No  Chills: No  PO Intake: regular diet  Bowel Function: normal  Urinary Status: voiding without complaint/concerns        PLAN                                                      Outpatient Plan:  Follow-up Appointments     Follow Up and recommended labs and tests      1. Follow up with your liver doctors at [facility] 1-2 weeks after discharge.    Future Appointments   Date Time Provider Department Center   6/21/2021 11:30 AM Kesha Payne MD [facility] G[facility] [place] CMA

## 2021-04-26 NOTE — TELEPHONE ENCOUNTER
[facility] Call Center    Phone Message    May a detailed message be left on voicemail: no     Reason for Call: Order(s): Other:   Reason for requested: new pt labs  Date needed: 5/3/21 appointment  Provider name: Andreea Garcia PA-C    Other: Krystal calling to schedule appointment for Nash. Writer scheduled HFU and lab for 5/3/21. However, Nash's insurance coverage is [employer]. Writer is not sure if this is the coverage that is in network. Krystal would like a call back today to discuss if this coverage is in or out of network. Please call Krystal at your earliest convenience to discuss.     Action Taken: Message routed to:  [facility] ([facility]):  HEPATOLOGY    Travel Screening: Not Applicable

## 2021-04-26 NOTE — TELEPHONE ENCOUNTER
Additional Information    Negative: [1] Caller is not with the adult (patient) AND [2] reporting urgent symptoms    Negative: Lab result questions    Negative: Medication questions    Negative: Caller can't be reached by phone    Negative: Caller has already spoken to PCP or another triager    Negative: RN needs further essential information from caller in order to complete triage    General information question, no triage required and triager able to answer question    Protocols used: INFORMATION ONLY CALL-A-AH

## 2021-04-26 NOTE — TELEPHONE ENCOUNTER
RECORDS RECEIVED FROM: Internal   Appt Date: 05.03.2021    NOTES STATUS DETAILS   OFFICE NOTE from referring provider N/A    OFFICE NOTES from other specialists Internal 03.08.2021 Kesha Payne MD   DISCHARGE SUMMARY from hospital Internal 04.21.2021 Damian Bowens MD    08.29.2020 Marcelino Brown MD   MEDICATION LIST Internal / CE    LIVER BIOSPY (IF APPLICABLE)      PATHOLOGY REPORTS  N/A    IMAGING     ENDOSCOPY (IF AVAILABLE) N/A    COLONOSCOPY (IF AVAILABLE) N/A    ULTRASOUND LIVER Internal 04.22.2021 US ABDOMEN LIMITED WITH DOPPLER     08.31.2020 ULTRASOUND ABDOMEN LIMITED      CT OF ABDOMEN N/A    MRI OF LIVER N/A    FIBROSCAN, US ELASTOGRAPHY, FIBROSIS SCAN, MR ELASTOGRAPHY N/A    LABS     HEPATIC PANEL (LIVER PANEL) N/A    BASIC METABOLIC PANEL Internal 02.25.2021   COMPLETE METABOLIC PANEL Internal 04.24.2021   COMPLETE BLOOD COUNT (CBC) Internal 04.24.2021   INTERNATIONAL NORMALIZED RATIO (INR) Internal 04.22.2021   HEPATITIS C ANTIBODY N/A    HEPATITIS C VIRAL LOAD/PCR N/A    HEPATITIS C GENOTYPE N/A    HEPATITIS B SURFACE ANTIGEN N/A    HEPATITIS B SURFACE ANTIBODY N/A    HEPATITIS B DNA QUANT LEVEL N/A    HEPATITIS B CORE ANTIBODY N/A

## 2021-04-26 NOTE — TELEPHONE ENCOUNTER
"Wife calling.  March 5 he went to  and was given mediction that was 40mg.  Then antoehr medication was given but was only 20mg.    I asked her to contact her current phrmacbrenden [facility] and talk directly to the pharmacist about her concern and how to get a hold of prescriber Dr. smith and what is the current dose??? He just got out of the hospital so it may be hospitaliost at [facility]??  We just have historical med notes in chart and not current information on the drug.  I updated her on what medication is for since she did not know why he takes it and it is very important that he takes it since he was just discharged from the hospital and got \"fluid drained off his lungs\" she said.  Very important he gets this medication to help him get fluids off him in chest and in legs.  She verbalized understanding and was crying but will call pharmacy.    Lesli Block RN  [facility] Nurse Advisor        "

## 2021-04-26 NOTE — CONFIDENTIAL NOTE
Connected with pt's wife, Krystal. Advised that Krystal could check with the business office, number given. If they are unable to answer if [employer] is taken here advised she reach out to [employer]. Pt wife voiced understanding of plan.    Janie DAI LPN  Hepatology Clinic

## 2021-05-17 NOTE — ED PROVIDER NOTES
"  History   Chief Complaint:  Altered Mental Status       The history is provided by the patient.      Ramirez Leslie is a 76 year old male S/p hip procedure with history of Alcoholic Cirrhosis of liver with ascites , hepatic encephalopathy, Altered mental status, hypertension, and Diabetes Mellitus Type 2 who presents with Altered mental status. Per the triage note, he was found by his daughter standing in the bathroom naked . Notes past similar episode where he was admitted for encephalopathy.     The patient endorses dizziness.Denies fever, cough, vomiting, diarrhea, and appetite change. He states he has been taking his lactulose mediations 3-4 times a day. Denies sick contacts.Also, he has not been vaccinated.     Per his daughter Korin, the patient was supposed to go the laboratory on the 10th for workup, but he refused to have blood drawn because he \"did not want to get poked.\" For that same reason, he also refused to get his COVID-19 vaccination shot. Over the last couple of days she notes increased confusion more and more each day as his days and nights are flipped. He said \"good-morning\" while she was getting him ready for bed. This morning, he was standing naked in the shower for hours, but stating he was going to the bathroom.This tends to happened when his ammonia gets high. She also notes decreased appetite. Denies falls, vomiting, diarrhea, fever,  sick contacts and travels. His wife is COVID-19 vaccinated, but his daughter is not.  His daughter gives him his medications, but he will not finish his lactulose. He drinks more than half, but will not finish it all.       Review of Systems   Constitutional: Negative for appetite change and fever.   Respiratory: Negative for cough.    Gastrointestinal: Negative for diarrhea and vomiting.   Neurological: Positive for dizziness.        Altered mental status.    All other systems reviewed and are negative.        Allergies:  No Known " Allergies    Medications:  atorvastatin   carvedilol   cefdinir   dicyclomine   ferrous sulfate  furosemide   lactulose   metformin   omeprazole   spironolactone     Past Medical History:    Alcoholic Cirrhosis of liver with ascites   Diabetes Mellitus Type 2   Subdural hematoma   Hypertension    hepatic encephalopathy  Altered mental status   Epistaxis   New onset of Atrial fibrillation   Recurrent pleural effusion   Spontaneous bacterial peritonitis   Acute hypoxemic respiratory failure     Past Surgical History:    Appendectomy   Lithotripsy   HC Laparoscopic, orchiectomy     Family History:    Father: heart disease     Social History:  Arrival via EMS.   PCP:Richi Kay    Physical Exam     Patient Vitals for the past 24 hrs:   BP Temp Pulse Resp SpO2   05/17/21 1255 139/71 -- 58 16 --   05/17/21 1130 (!) 150/69 -- 75 26 98 %   05/17/21 1100 (!) 165/81 -- 80 20 96 %   05/17/21 1030 137/63 -- 71 13 96 %   05/17/21 1000 (!) 145/77 -- 68 18 96 %   05/17/21 0933 134/68 -- 64 13 96 %   05/17/21 0930 134/68 -- 69 21 97 %   05/17/21 0900 (!) 145/65 -- 67 14 92 %   05/17/21 0840 (!) 145/69 -- 79 23 94 %   05/17/21 0830 (!) 145/69 -- 62 17 92 %   05/17/21 0800 133/59 -- 68 20 97 %   05/17/21 0745 126/64 -- 64 16 94 %   05/17/21 0744 126/64 -- 61 16 --   05/17/21 0715 -- -- 96 20 --   05/17/21 0645 -- -- 76 17 95 %   05/17/21 0630 -- -- -- -- 98 %   05/17/21 0615 -- -- -- -- 98 %   05/17/21 0606 (!) 155/70 97.8  F (36.6  C) 68 16 100 %       Physical Exam  Constitutional:       Appearance: He is well-developed.   HENT:      Head: Atraumatic.      Right Ear: External ear normal.      Left Ear: External ear normal.      Mouth/Throat:      Mouth: Mucous membranes are moist.      Pharynx: Oropharynx is clear. No oropharyngeal exudate.   Eyes:      General: No scleral icterus.     Extraocular Movements: Extraocular movements intact.      Conjunctiva/sclera: Conjunctivae normal.      Pupils: Pupils are equal, round, and  reactive to light.   Neck:      Musculoskeletal: Normal range of motion and neck supple.   Cardiovascular:      Rate and Rhythm: Normal rate and regular rhythm.      Heart sounds: Normal heart sounds. No murmur. No friction rub. No gallop.    Pulmonary:      Effort: Pulmonary effort is normal. No respiratory distress.      Breath sounds: Normal breath sounds. No wheezing or rales.   Abdominal:      General: Bowel sounds are normal. There is no distension.      Palpations: Abdomen is soft. There is no mass.      Tenderness: There is no abdominal tenderness.   Musculoskeletal: Normal range of motion.   Skin:     General: Skin is warm and dry.      Capillary Refill: Capillary refill takes less than 2 seconds.      Findings: No rash.   Neurological:      General: No focal deficit present.      Mental Status: He is alert.      Comments: Oriented to person and place only. HAILE x 4 spontaneously.           Emergency Department Course     ECG:  ECG taken at 0628  Atrial fibrillation   Low voltage QRS   Prolonged QT  Abnormal ECG   Rate 72 bpm. MT interval * ms. QRS duration 108 ms. QT/QTc 440/481 ms. P-R-T axes * 64 41.     Imaging:  XR Chest 2 Views  IMPRESSION: Stable right basilar patchy airspace opacity either  atelectasis, edema or infection. Stable small right pleural effusion.  No left pleural effusion. No pneumothorax. Stable mild cardiomegaly.  Tortuous aorta with atherosclerotic calcifications.  Reading per radiology    CT Head w/o Contrast  IMPRESSION:  1. Chronic intracranial changes. No evidence for intracranial  hemorrhage or any acute intracranial process.  2. Persistent right maxillary sinus disease incompletely visualized in  this study.  Reading per radiology    Laboratory:  CBC: WBC 2.6 (L) , HGB 9.7 (L) , PLT 53 (L)    CMP: chloride 115 (H) Glucose 105 (H) Calcium 8.1 (L) Bilirubin 2.3 (H) Albumin 2.4 (L) Protein total 5.9 (L), alkphos 373 (H), ALT 75 (H) AST 65 (H) Creatinine 0.87 o/w WNL  Magnesium: 1.6    Lactic acid (result time 0849) 1.6   Asymptomatic COVID19 Virus PCR by nasopharyngeal swab Negative   UA with microscopic: ketone trace, blood moderate, Protein Albumin 20, urobilinogen >12.0 (H), RBC/ (H), mucous present  o/w WNL  blood gas venous: pH: 7.52 (H) , PCO2: 27 (L) , PO2: 74 (H) , Bicarbonate: 22, FIO2 RMA, base excess 0.3   TSH: 3.18  Ammonia:  153 (HH)   Troponin: (Collected 0837) <0.015  INR: 1.49 (H)   ABO RH Type and Screen: O+, antibody Negative    Emergency Department Course:    Reviewed:  I reviewed nursing notes, vitals, past medical history and care everywhere    Assessments:  0607 I obtained history and examined the patient as noted above.   0630 I called and spoke to his daughter, Korin.     Consults:   0930 : I spoke with Casi Cohen PA-C accepting for Dr. Marion of the Hospitalist service from [facility] regarding patient's presentation, findings, and plan of care.    Interventions:  0644 NS, 1 L, IV  0914 lactulose 20g Oral    Disposition:  The patient was admitted to the hospital under the care of Dr. Marion.     Impression & Plan       Medical Decision Making:  Ramirez Leslie is a 76 year old male who presents with confusion. Per family, he is not able to give significant history due to confusion. I spoke to the wife and daughter and they indicated that this happens when his ammonia gets high. It sounds like maybe this has been going on for a few days where he is taking less lactulose . No other causes of confusion were found at this point. I did discuss with the family the need for admission and further workup and they were comfortable with the plan. Patient does require sitter here, has been stable and is admitted to Dr. Marion in stable condition. ED diagnosis hepatic encephalopathy.     Covid-19  Ramirez Leslie was evaluated during a global COVID-19 pandemic, which necessitated consideration that the patient might be at risk for  infection with the SARS-CoV-2 virus that causes COVID-19.   Applicable protocols for evaluation were followed during the patient's care.   COVID-19 was considered as part of the patient's evaluation. The plan for testing is:  a test was obtained during this visit.    Diagnosis:    ICD-10-CM    1. Hepatic encephalopathy (H)  K72.90 UA with Microscopic reflex to Culture     TSH     ABO/Rh type and screen       Scribe Disclosure:  Nataly BALTAZAR, am serving as a scribe at 6:05 AM on 5/17/2021 to document services personally performed by Meg Hancock MD based on my observations and the provider's statements to me.          Meg Hancock MD  05/17/21 1278

## 2021-05-17 NOTE — ED NOTES
DATE:  5/17/2021   TIME OF RECEIPT FROM LAB:  0906  LAB TEST:  ammonia  LAB VALUE:  153  RESULTS GIVEN WITH READ-BACK TO (PROVIDER):  Dr Hancock  TIME LAB VALUE REPORTED TO PROVIDER:   0907

## 2021-05-17 NOTE — ED TRIAGE NOTES
Hx/o liver failure with hepatic encephalopathy.  Takes lactulose daily.  Had TIPS procedure 5/5.  Found confused, standing in bathroom naked by family.

## 2021-05-17 NOTE — ED NOTES
Tried calling daughter, Korin, x2, no answer, unable to leave message as no identification on VM. Will try to contact again soon

## 2021-05-17 NOTE — PLAN OF CARE
Presentation/Diagnosis: Pt admitted  for worsening confusion. Thought to be acute encephalopathy   History: End-stage lver disease, alcoholic cirrhosis, prostate cancer, DM2  Labs/Protocols: B  Vitals: VSS, bp's elevated. No pain  Respiratory: WDL. RA.   Neuro: Oriented only to self. Pleasant.   GI/: External catheter   Skin: Generalized bruising on arms, abdomen, legs. Abrasion noted on L calf, scrape noted on right hand.   LDA's: RPIV SL   Diet: Low fat/Na   Activity: A1 w/gb   Plan: Plan is to discharge in 2-3 days once ammonia levels are normal. Continue POC.

## 2021-05-17 NOTE — PHARMACY-ADMISSION MEDICATION HISTORY
Admission medication history interview status for this patient is complete. See [facility] admission navigator for allergy information, prior to admission medications and immunization status.     Medication history interview done, indicate source(s): Patient's daughter Korin manages meds  Medication history resources (including written lists, pill bottles, clinic record): [employer] | Anywhere dispense records  Pharmacy: [facility]    Changes made to PTA medication list:  Added: none  Deleted: cefdinir  Changed: none    Actions taken by pharmacist (provider contacted, etc):None     Additional medication history information:    **Daughter thought pt might only be taking carvedilol once daily, prescription filled at [facility] is for 3.125mg twice daily.         Medication reconciliation/reorder completed by provider prior to medication history?  N   (Y/N)     For patients on insulin therapy:   Spoke w/ daughter, did not assess       Prior to Admission medications    Medication Sig Last Dose Taking? Auth Provider   atorvastatin (LIPITOR) 20 MG tablet Take 20 mg by mouth every morning  5/16/2021 at Unknown time Yes Unknown, Entered By History   carvedilol (COREG) 3.125 MG tablet Take 3.125 mg by mouth 2 times daily (with meals) 5/16/2021 at Unknown time Yes Unknown, Entered By History   dicyclomine (BENTYL) 20 MG tablet Take 20 mg by mouth 2 times daily 5/16/2021 at Unknown time Yes Unknown, Entered By History   ferrous sulfate (FE TABS) 325 (65 Fe) MG EC tablet Take 325 mg by mouth 2 times daily 5/16/2021 at Unknown time Yes Unknown, Entered By History   furosemide (LASIX) 20 MG tablet Take 20 mg by mouth daily 5/16/2021 at Unknown time Yes Unknown, Entered By History   insulin glargine (LANTUS PEN) 100 UNIT/ML pen Inject 16 Units Subcutaneous every morning  5/16/2021 at Unknown time Yes Shankar Fair MD   lactulose (CEPHULAC) 10 GM packet Take 1 packet (10 g) by mouth 2 times daily 5/16/2021 at Unknown time Yes Damian Bowens MD    metFORMIN (GLUCOPHAGE) 500 MG tablet Take 500 mg by mouth 2 times daily (with meals) 5/16/2021 at Unknown time Yes Unknown, Entered By History   omeprazole (PRILOSEC) 20 MG DR capsule Take 20 mg by mouth 2 times daily 5/16/2021 at Unknown time Yes Unknown, Entered By History   spironolactone (ALDACTONE) 100 MG tablet Take 100 mg by mouth daily 5/16/2021 at Unknown time Yes Unknown, Entered By History

## 2021-05-17 NOTE — ED NOTES
Talked with, Korin, pts daughter. Updated her on the plan of care for pt. Daughter in agreement with care plan.

## 2021-05-17 NOTE — CONSULTS
Gastroenterology Consultation      Ramirez Leslie MRN# [id number]   YOB: 1945 Age: 76 year old   Date of Admission: 5/17/2021     Reason for consult: I was asked by Casi Cohen to evaluate this patient for encephalopathy.               History of Present Illness:   This patient is a 76 year old male who presents with altered mentation.  He has a history of cirrhosis related to fatty liver and had a TIPS May 5 because of recurrent pleural fluid accumulation.  He was seen in GI clinic last week and there were no concerns.  According to the admission note there have been changes in behavior and thinking for a week, with a big change the past two days.  He relates that he was admitted with weakness.  He was obviously encephalopathic in the ER.  He denies pain or a fever.  His appetite has not been great for a while.  It is not clear to me that he was on lactulose as an outpatient.  He was on it in April after an episode of encephalopathy related to an infection.  He reports regular bowel movements.  He does not like the taste of lactulose.                Past Medical History:     Past Medical History:   Diagnosis Date     Cirrhosis (H)      HTN (hypertension)      Nephrolithiasis      Subdural hematoma (H)      Type II diabetes mellitus (H)     insulin dependent             Past Surgical History:     Past Surgical History:   Procedure Laterality Date     APPENDECTOMY       HC LAP,ORCHIECTOMY Left      LITHOTRIPSY                 Social History:     Social History     Socioeconomic History     Marital status:      Spouse name: Not on file     Number of children: Not on file     Years of education: Not on file     Highest education level: Not on file   Occupational History     Not on file   Social Needs     Financial resource strain: Not on file     Food insecurity     Worry: Not on file     Inability: Not on file     Transportation needs     Medical: Not on file     Non-medical: Not on file    Tobacco Use     Smoking status: Former Smoker     Packs/day: 2.00     Years: 40.00     Pack years: 80.00     Start date: 1957     Quit date: 2007     Years since quittin.9     Smokeless tobacco: Never Used   Substance and Sexual Activity     Alcohol use: Not on file     Drug use: Not on file     Sexual activity: Not on file   Lifestyle     Physical activity     Days per week: Not on file     Minutes per session: Not on file     Stress: Not on file   Relationships     Social connections     Talks on phone: Not on file     Gets together: Not on file     Attends [employer] service: Not on file     Active member of club or organization: Not on file     Attends meetings of clubs or organizations: Not on file     Relationship status: Not on file     Intimate partner violence     Fear of current or ex partner: Not on file     Emotionally abused: Not on file     Physically abused: Not on file     Forced sexual activity: Not on file   Other Topics Concern     Not on file   Social History Narrative    He used to work in glass installation             Family History:     Family History   Problem Relation Age of Onset     Heart Disease Father              Allergies:    No Known Allergies          Medications:     No current outpatient medications on file.             Review of Systems:   10-point review of systems negative except as noted in HPI.            Physical Exam:   Vitals were reviewed  BP (!) 156/64 (BP Location: Left arm)   Pulse 67   Temp 96.3  F (35.7  C) (Oral)   Resp 18   SpO2 96%   Constitutional:   No distress, stated president is Yaima, knew month is May, did not know the date and stated he was at [employer]     Heent:   NC/AT, sclera anicteric     Neck:   No adenopathy, thyroid not palpable   Respiratory:   CTA anteriorly     Cardiovascular:   Irregular rate with systolic murmur     Gastrointestinal:   Active BS, soft, NT/ND     Extremities:   No clubbing, cyanosis or edema   Neuro:   +  asterixis     Skin:   No rashes or ecchymoses   Psychiatry:        No evidence of anxiety or depression         Data:     ROUTINE IP LABS  BMP  Last Comprehensive Metabolic Panel:  Sodium   Date Value Ref Range Status   05/17/2021 142 133 - 144 mmol/L Final     Potassium   Date Value Ref Range Status   05/17/2021 3.9 3.4 - 5.3 mmol/L Final     Chloride   Date Value Ref Range Status   05/17/2021 115 (H) 94 - 109 mmol/L Final     Carbon Dioxide   Date Value Ref Range Status   05/17/2021 21 20 - 32 mmol/L Final     Anion Gap   Date Value Ref Range Status   05/17/2021 6 3 - 14 mmol/L Final     Glucose   Date Value Ref Range Status   05/17/2021 105 (H) 70 - 99 mg/dL Final     Urea Nitrogen   Date Value Ref Range Status   05/17/2021 19 7 - 30 mg/dL Final     Creatinine   Date Value Ref Range Status   05/17/2021 0.87 0.66 - 1.25 mg/dL Final     GFR Estimate   Date Value Ref Range Status   05/17/2021 84 >60 mL/min/[1.73_m2] Final     Comment:     Non  GFR Calc  Starting 12/18/2018, serum creatinine based estimated GFR (eGFR) will be   calculated using the Chronic Kidney Disease Epidemiology Collaboration   (CKD-EPI) equation.       Calcium   Date Value Ref Range Status   05/17/2021 8.1 (L) 8.5 - 10.1 mg/dL Final       CBC  Lab Results   Component Value Date    WBC 2.6 05/17/2021     Lab Results   Component Value Date    RBC 3.13 05/17/2021     Lab Results   Component Value Date    HGB 9.7 05/17/2021     Lab Results   Component Value Date    HCT 30.1 05/17/2021     No components found for: MCT  Lab Results   Component Value Date    MCV 96 05/17/2021     Lab Results   Component Value Date    MCH 31.0 05/17/2021     Lab Results   Component Value Date    MCHC 32.2 05/17/2021     Lab Results   Component Value Date    RDW 17.6 05/17/2021     Lab Results   Component Value Date    PLT 53 05/17/2021       INR  Lab Results   Component Value Date    INR 1.49 05/17/2021       PANCREASNo lab results found in last 7  days.  LFT  Recent Labs   Lab 05/17/21  0837   PROTTOTAL 5.9*   ALBUMIN 2.4*   BILITOTAL 2.3*   ALKPHOS 373*   AST 65*   ALT 75*                Assessment and Plan:   Hepatic encephalopathy is commonly worsened by a TIPS.  There is no other likely reason for his mental status change and his ammonia is elevated.  He likely is going to need chronic medication for the encephalopathy.  One option is higher doses of lactulose, with titration for 2-3 stools daily, or rifaximin - alone or with less lactulose.  I suspect he will be much improved by tomorrow.      Saroj Kuhn MD  [facility]  Office:  [phone]

## 2021-05-17 NOTE — ED NOTES
[facility]  ED Nurse Handoff Report    Ramirez Leslie is a 76 year old male   ED Chief complaint: Altered Mental Status  . ED Diagnosis:   Final diagnoses:   None     Allergies: No Known Allergies    Code Status: Full Code  Activity level - Baseline/Home:  Independent. Activity Level - Current:   Assist X 1. Lift room needed: No. Bariatric: No   Needed: No   Isolation: No. Infection: Not Applicable.     Vital Signs:   Vitals:    05/17/21 0800 05/17/21 0830 05/17/21 0840 05/17/21 0900   BP: 133/59 (!) 145/69 (!) 145/69 (!) 145/65   Pulse: 68 62 79 67   Resp: 20 17 23 14   Temp:       SpO2: 97% 92% 94% 92%       Cardiac Rhythm:  ,      Pain level:    Patient confused: Yes. Patient Falls Risk: Yes.   Elimination Status: cathed for urine sample, pt confused   Patient Report - Initial Complaint:Hx/o liver failure with hepatic encephalopathy.  Takes lactulose daily.  Had TIPS procedure 5/5.  Found confused, standing in bathroom naked by family . Focused Assessment:    07:24 Cardiac Cardiac - Cardiac WDL: WDL  TF      07:23 Respiratory Respiratory - Respiratory WDL: WDL  TF     07:22 Neurological Cognitive - Cognitive/Neuro/Behavioral WDL: .WDL except; orientation  Level of Consciousness: confused  Arousal Level: opens eyes spontaneously  Orientation: disoriented x 4  Follows Commands: inconsistent  Speech: clear; spontaneous  Best Language: 0 - No aphasia  Mood/Behavior: hyperactive (agitated, impulsive)   Pool Coma Scale - Best Eye Response: 4-->(E4) spontaneous  Best Motor Response: 6-->(M6) obeys commands  Best Verbal Response: 4-->(V4) confused  Ryann Coma Scale Score: 14   Motor Strength - Left Upper: 5 - active movement against gravity and full resistance  Right Upper: 5 - active movement against gravity and full resistance  Left Lower: 5 - active movement against gravity and full resistance  Right Lower: 5 - active movement against gravity and full resistance          Tests  Performed: labs, imaging. Abnormal Results:   Labs Ordered and Resulted from Time of ED Arrival Up to the Time of Departure from the ED   ROUTINE UA WITH MICROSCOPIC REFLEX TO CULTURE - Abnormal; Notable for the following components:       Result Value    Ketones Urine Trace (*)     Blood Urine Moderate (*)     Protein Albumin Urine 20 (*)     Urobilinogen mg/dL >12.0 (*)     RBC Urine 111 (*)     Mucous Urine Present (*)     All other components within normal limits   AMMONIA - Abnormal; Notable for the following components:    Ammonia 153 (*)     All other components within normal limits   BLOOD GAS VENOUS - Abnormal; Notable for the following components:    Ph Venous 7.52 (*)     PCO2 Venous 27 (*)     PO2 Venous 74 (*)     All other components within normal limits   CBC WITH PLATELETS DIFFERENTIAL - Abnormal; Notable for the following components:    WBC 2.6 (*)     RBC Count 3.13 (*)     Hemoglobin 9.7 (*)     Hematocrit 30.1 (*)     RDW 17.6 (*)     Platelet Count 53 (*)     Absolute Lymphocytes 0.4 (*)     All other components within normal limits   COMPREHENSIVE METABOLIC PANEL - Abnormal; Notable for the following components:    Chloride 115 (*)     Glucose 105 (*)     Calcium 8.1 (*)     Bilirubin Total 2.3 (*)     Albumin 2.4 (*)     Protein Total 5.9 (*)     Alkaline Phosphatase 373 (*)     ALT 75 (*)     AST 65 (*)     All other components within normal limits   INR - Abnormal; Notable for the following components:    INR 1.49 (*)     All other components within normal limits   SARS-COV-2 (COVID-19) VIRUS RT-PCR   LACTIC ACID WHOLE BLOOD   MAGNESIUM   TROPONIN I   TSH   GLUCOSE MONITOR NURSING POCT   PULSE OXIMETRY NURSING   CARDIAC CONTINUOUS MONITORING   PERIPHERAL IV CATHETER   CATHETERIZE FOR RESIDUAL   ABO/RH TYPE AND SCREEN     XR Chest 2 Views   Final Result   IMPRESSION: Stable right basilar patchy airspace opacity either   atelectasis, edema or infection. Stable small right pleural effusion.    No left pleural effusion. No pneumothorax. Stable mild cardiomegaly.   Tortuous aorta with atherosclerotic calcifications.      SOILA PITT MD      CT Head w/o Contrast   Final Result   IMPRESSION:   1. Chronic intracranial changes. No evidence for intracranial   hemorrhage or any acute intracranial process.   2. Persistent right maxillary sinus disease incompletely visualized in   this study.      THERESA JOHN MD           Treatments provided: see mar  Family Comments: family stated they would not be coming into hospital  OBS brochure/video discussed/provided to patient:  Yes  ED Medications:   Medications   lidocaine 1 % 0.1-1 mL (has no administration in time range)   lidocaine (LMX4) cream (has no administration in time range)   sodium chloride (PF) 0.9% PF flush 3 mL (has no administration in time range)   sodium chloride (PF) 0.9% PF flush 3 mL (has no administration in time range)   lidocaine (XYLOCAINE) 2 % external gel (has no administration in time range)   0.9% sodium chloride BOLUS (1,000 mLs Intravenous New Bag 5/17/21 0644)     Drips infusing:  No  For the majority of the shift, the patient's behavior Green. Interventions performed were n/a, sitter needed d/t confusion.    Sepsis treatment initiated: No     Patient tested for COVID 19 prior to admission: YES    ED Nurse Name/Phone Number: Nuvia Serna RN,   7:58 AM    RECEIVING UNIT ED HANDOFF REVIEW    Above ED Nurse Handoff Report was reviewed: Yes  Reviewed by: Leyla Ruiz RN on May 17, 2021 at 12:49 PM

## 2021-05-18 NOTE — PHARMACY-RX INSURANCE COVERAGE
Xifaxan coverage check.  Patient has Medicare D through [employer] sponsored by an employer.    Since this plan is sponsored by an employer, I can't access the benefits.  I can only run a test claim.    Xifaxan: $697.    An income-based patient assistance program is offered by the  of Xifaxan.  Patient would qualify if his income is less than $38,640 (single) or $52,260 (). Application can be found at [email or web address]       -OANH Miller Pharmacy Technician/Liaison, Discharge Pharmacy [phone]

## 2021-05-18 NOTE — PROGRESS NOTES
SPIRITUAL HEALTH SERVICES Progress Note  [facility]    Spiritual Health Services consult order for assistance with healthcare directive.  Provided informational resources and education related to directive.     Plan: No further needs expressed at this time. Pt is discharging.    Varun Johnson MA  Staff   Pager: [phone]  Phone: [phone]

## 2021-05-18 NOTE — PLAN OF CARE
A/O x 4. VSS on RA. Denied pain. BG 79, 221.  Denied CP. LS clear, denied SOB. No PIV access, pt informed refusal for new placement. Up ad haydee, steady gait, denies dizziness, sitter pulled around 1000, calls appropriately. Adequate for discharge today, home w/ wife. Will continue POC.

## 2021-05-18 NOTE — DISCHARGE SUMMARY
[facility]    Discharge Summary  Hospitalist    Date of Admission:  5/17/2021  Date of Discharge:  05/18/21  Discharging Provider: Patrice Morgan MD  Date of Service (when I saw the patient): 05/18/21    Discharge Diagnoses   Hepatic encephalopathy.  Recent TIPS procedure.  End-stage liver disease.  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  Alcoholic liver disease.  History of [place]enn[place].  Prostate cancer.  Type 2 diabetes.  Subdural hematoma in 2017.  Atrial fibrillation.  Not on anticoagulation due to thrombocytopenia.  Hypertension.  Pancytopenia.      History of Present Illness   Ramirez Leslie is a 76 year old male with PMhx of end-stage liver disease, alcoholic cirrhosis with ascites, prior suspected hepatic hydrothorax requiring intermittent therapeutic thoracentesis, hepatic encephalopathy, prior SBP, prostate cancer, nephrolithiasis, SDH in 2017, type 2 diabetes mellitus, prostate cancer, who presented with decompensated hepatic encephalopathy.    Patient's ammonia level was found to be 153 at admission.  Patient was started on higher dose of lactulose and rifaximin.  Admitted to internal medicine service with GI consultation.    Hospital Course     Hepatic encephalopathy.  Patient was admitted to medicine service.  Started on lactulose 3 times a day.  Rifaximin was also added.  Patient was seen by GI team.  They agreed with the plan.  No infectious trigger identified.  Per GI team, likely due to recent TIPS procedure.    Today, patient is fully alert and oriented.  He is walking in the hallways independently.  On a regular diet.  Ammonia level is down to 65.    He is eager to go home today.    I had a discussion with the patient regarding changes in the medications: Lactulose has been increased to 3 times a day (from twice a day).  Rifaximin has also been added.    Otherwise his other home medications to continue as before without any change.  Follow-up will be  with primary care physician and with his hematology team.      I personally evaluated and examined the patient on the day of discharge.    Patrice Morgan MD      Pending Results   These results will be followed up by PCP  Unresulted Labs Ordered in the Past 30 Days of this Admission     No orders found for last 31 day(s).               Primary Care Physician   TORSTEN OJEDA        Discharge Disposition   Discharged to home  Condition at discharge: Stable    Consultations This Hospital Stay   CARE MANAGEMENT / SOCIAL WORK IP CONSULT  GASTROENTEROLOGY IP CONSULT  SOCIAL WORK IP CONSULT  PALLIATIVE CARE ADULT IP CONSULT  ADVANCE DIRECTIVE IP CONSULT    Time Spent on this Encounter   Discharge time: greater than 30 minutes.    Discharge Orders      Reason for your hospital stay    Hepatic encephalopathy.  Secondary to recent TIPS procedure.     Follow-up and recommended labs and tests     Follow up with primary care provider, TORSTEN OJEDA, within 7 days for hospital follow- up.  The following labs/tests are recommended: Ammonia level in 1 week.  Follow-up with hepatology team in 1 to 2 weeks.     Activity    Your activity upon discharge: activity as tolerated     Discharge Instructions    Admitted for confusion which was due to high ammonia level (hepatic encephalopathy).  Medication changes done:  Lactulose was increased, from twice a day to 3 times a day.  It may cause diarrhea.  If diarrhea happens then decreased dose back to twice a day and also call your hepatology team.  Rifaximin was also started which is also to decrease the ammonia level.     Diet    Follow this diet upon discharge: Orders Placed This Encounter      Low Saturated Fat Na <2400 mg     Discharge Medications   Current Discharge Medication List      START taking these medications    Details   rifaximin (XIFAXAN) 550 MG TABS tablet Take 1 tablet (550 mg) by mouth 2 times daily  Qty: 60 tablet, Refills: 3    Associated Diagnoses: Hepatic  encephalopathy (H)         CONTINUE these medications which have CHANGED    Details   lactulose (CEPHULAC) 10 GM packet Take 1 packet (10 g) by mouth 3 times daily  Qty: 60 packet, Refills: 3    Associated Diagnoses: Hepatic encephalopathy (H)         CONTINUE these medications which have NOT CHANGED    Details   atorvastatin (LIPITOR) 20 MG tablet Take 20 mg by mouth every morning       carvedilol (COREG) 3.125 MG tablet Take 3.125 mg by mouth 2 times daily (with meals)      dicyclomine (BENTYL) 20 MG tablet Take 20 mg by mouth 2 times daily      ferrous sulfate (FE TABS) 325 (65 Fe) MG EC tablet Take 325 mg by mouth 2 times daily      furosemide (LASIX) 20 MG tablet Take 20 mg by mouth daily      insulin glargine (LANTUS PEN) 100 UNIT/ML pen Inject 16 Units Subcutaneous every morning     Comments: If Lantus is not covered by insurance, may substitute Basaglar at same dose and frequency.        metFORMIN (GLUCOPHAGE) 500 MG tablet Take 500 mg by mouth 2 times daily (with meals)      omeprazole (PRILOSEC) 20 MG DR capsule Take 20 mg by mouth 2 times daily      spironolactone (ALDACTONE) 100 MG tablet Take 100 mg by mouth daily           Allergies   No Known Allergies  Data   Most Recent 3 CBC's:  Recent Labs   Lab Test 05/18/21 0627 05/17/21  0837 04/24/21  0811   WBC 2.5* 2.6* 1.9*   HGB 10.0* 9.7* 10.1*   MCV 95 96 93   PLT 54* 53* 44*      Most Recent 3 BMP's:  Recent Labs   Lab Test 05/18/21 0627 05/17/21  0837 04/24/21  0811   * 142 140   POTASSIUM 3.7 3.9 4.0   CHLORIDE 117* 115* 111*   CO2 21 21 20   BUN 14 19 17   CR 0.88 0.87 0.85   ANIONGAP 7 6 10   ADIS 8.1* 8.1* 8.2*   GLC 84 105* 85     Most Recent 2 LFT's:  Recent Labs   Lab Test 05/18/21 0627 05/17/21  0837   AST 52* 65*   ALT 62 75*   ALKPHOS 341* 373*   BILITOTAL 2.7* 2.3*     Most Recent INR's and Anticoagulation Dosing History:  Anticoagulation Dose History     Recent Dosing and Labs Latest Ref Rng & Units 10/29/2020 10/30/2020 1/5/2021  2/9/2021 4/21/2021 4/22/2021 5/17/2021    INR 0.86 - 1.14 1.51(H) 1.33(H) 1.47(H) 1.49(H) 1.53(H) 1.83(H) 1.49(H)        Most Recent 3 Troponin's:  Recent Labs   Lab Test 05/17/21  0837 04/21/21  0601 02/09/21  0914   TROPI <0.015 <0.015 <0.015     Most Recent Cholesterol Panel:No lab results found.  Most Recent 6 Bacteria Isolates From Any Culture (See EPIC Reports for Culture Details):  Recent Labs   Lab Test 04/21/21  0756 04/21/21  0755 08/31/20  1150 08/29/20  2343 08/29/20  2309   CULT No growth No growth No growth <10,000 colonies/mL  mixed urogenital rehana   No growth     Most Recent TSH, T4 and A1c Labs:  Recent Labs   Lab Test 05/17/21  0837 04/21/21  1759   TSH 3.18  --    A1C  --  5.6

## 2021-05-18 NOTE — PLAN OF CARE
Alert, disorientated to time and place. VSS on RA. Denies pain. Frequent small loose stools; received lactulose. Pt did not have IV access at shift change. Was approached for new IV placement; pt became upset and stated he did not want an IV placed. Hospitalist notified. Impulsive at times. SBA to the bathroom. B/100. Trace edema to BLE. Will continue with POC.

## 2021-05-18 NOTE — PROGRESS NOTES
GASTROENTEROLOGY PROGRESS NOTE        SUBJECTIVE:  Patient had not been using lactulose as he did not like the taste.  Frequent bowel movements yesterday, now slowing down.     OBJECTIVE:    /64 (BP Location: Right arm)   Pulse 74   Temp 96.3  F (35.7  C) (Oral)   Resp 20   SpO2 95%   Temp (24hrs), Av.5  F (35.8  C), Min:96.3  F (35.7  C), Max:96.9  F (36.1  C)    No data found.    Intake/Output Summary (Last 24 hours) at 2021 0950  Last data filed at 2021 0730  Gross per 24 hour   Intake 480 ml   Output --   Net 480 ml        PHYSICAL EXAM     Constitutional: No distress, alert and oriented.  Abdomen: Soft, nontender.  NEURO: CN 2-12 grossly intact, no focal deficits, no asterixis           Additional Comments:  ROS, FH, SH: See initial GI consult for details.     I have reviewed the patient's new clinical lab results:     Recent Labs   Lab Test 21  0837 21  0811 21  0608 21  0608 21  0601 21  0601   WBC 2.5* 2.6* 1.9*   < > 1.9*   < > 2.7*   HGB 10.0* 9.7* 10.1*   < > 9.9*   < > 12.2*   MCV 95 96 93   < > 94   < > 91   PLT 54* 53* 44*   < > 39*   < > 61*   INR  --  1.49*  --   --  1.83*  --  1.53*    < > = values in this interval not displayed.     Recent Labs   Lab Test 21  0837 21  0811   POTASSIUM 3.7 3.9 4.0   CHLORIDE 117* 115* 111*   CO2    BUN 14 19 17   ANIONGAP 7 6 10     Recent Labs   Lab Test 21  0621  0837 21  0826 21  0811 21  0614 21  0614 21  0601 20  2343 20  2343   ALBUMIN 2.4* 2.4*  --  2.7*   < >  --  2.8*   < >  --    BILITOTAL 2.7* 2.3*  --  1.5*   < >  --  1.3   < >  --    ALT 62 75*  --  25   < >  --  28   < >  --    AST 52* 65*  --  38   < >  --  31   < >  --    PROTEIN  --   --  20*  --   --  20*  --   --  30*   LIPASE  --   --   --   --   --   --  378  --   --     < > = values in this interval not displayed.         ASSESSMENT/ PLAN  Ramirez Leslie is a 75 yo male with history of cirrhosis related to fatty liver and had a TIPS May 5 because of recurrent pleural fluid accumulation who presented with altered mental status.    1.  Hepatic encephalopathy: Hepatic encephalopathy is commonly worsened by TIPS.  He has improved significantly with lactulose and rifaximin.  He is alert and oriented.  He has no abdominal pain.  He passed a significant amount of loose stool yesterday.  Would titrate lactulose to 2-3 bowel movements daily.  No evidence of infection or GI bleeding.  He will continue on his diuretics spironolactone and furosemide.  -- Continue to titrate lactulose  -- Patient to continue regular follow-up with hepatology.  -- Stable from a GI standpoint.    Discussed with Dr. Bam Neff PA-C  [facility] ( [facility])

## 2021-05-18 NOTE — DISCHARGE SUMMARY
AVS reviewed with pt. All questions answered. Pt denies any further questions or concerns. All belongings returned. Pt escorted to front door via WC by [place] staff, home w/ wife.

## 2021-05-18 NOTE — CONSULTS
[facility]  Palliative Care Consultation   Text Page    Assessment & Plan   Ramirez Leslie is a 76 year old male who was admitted on 5/17/2021.   Consulted by Casi Cohen  to assist with goals of care , POLST, and development of plan of care.    Recommendations:  1. Goals of Care- No CPR- Do NOT Intubate-restorative cares  Hospitalization goals discussed Discussed code status and completed POLST. Discussed rehospitalization and he reports that he would want to be rehospitalized if he were to get sick. He would not want a feeding tube, or CPR or Intubation.   Decisional Capacity- Intact. Patient does not have an advance directive. Per  informed consent policy next of kin should be involved if patient becomes unable.  POLST filled out today and emailed to honoring choices    2. Generalized weakness  -Appreciate the input of therapies for discharge recommendations    3. Spiritual Care  Oriented to Spiritual Health as part of Palliative Care team. Spiritual Health Services declined at this time.    4. Care Planning  Appreciate Care of [facility] for discharge planning as able.  None at this time    Medical Decision Making and Goals of Care:  Discussed on May 18, 2021 with Deepa MOODY, CNP: met with patient in his room. He is alert and oriented x 3. Discussed goals of care, and code status. Patient stated he does not want CPR or Intubation or feeding tube. Discussed further treatments and hospitalizations and he stated he would want that. He does not want IV here as he feels he is better and does not need it. He stated he is hoping to be able to go home today. Discussed medications at discharge to prevent further hospitalizations. Discussed his liver disease and he was able to verbalize understanding of disease and what to expect. Questions asked and answered. Phone call placed to wife, left message.    Thank you for involving us in the patient's care.     Deepa PEREZ  Mustapha MOODY CNP  Pain Management and Palliative Care  [facility]  Pgr: [phone]    Time Spent on this Encounter   Total unit/floor time 59 minutes, time consisted of the following, examination of the patient, reviewing the record and completing documentation. >50% of time spent in counseling and coordination of care, Bedside Nurse Miriam and Hospitalist Dr Morgan.  Time spend counseling with patient consisted of the following topics, goals of care, advance care planning and education about diagnosis.    Understanding of disease process:   This has been discussed with patient .    History of Present Illness   History is obtained from the patient  Electronic medical record    Ramirez Leslie is a 76 year old male with a past medical history of end stage liver disease, alcoholic cirrhosis with ascites s/p TIPs, prior suspected hydrothorax requiring intermittent therapeutic thoracentesis, hepatic encephalopathy, history of SBP, prostate cancer, nephrolithiasis, SDH in 2017, DM2, who presents with hepatic encephalopathy.    Past Medical History   I have reviewed this patient's medical history and updated it with pertinent information if needed.   Past Medical History:   Diagnosis Date     Cirrhosis (H)      HTN (hypertension)      Nephrolithiasis      Subdural hematoma (H)      Type II diabetes mellitus (H)     insulin dependent       Past Surgical History   I have reviewed this patient's surgical history and updated it with pertinent information if needed.  Past Surgical History:   Procedure Laterality Date     APPENDECTOMY       HC LAP,ORCHIECTOMY Left      LITHOTRIPSY         Prior to Admission Medications   Prior to Admission Medications   Prescriptions Last Dose Informant Patient Reported? Taking?   atorvastatin (LIPITOR) 20 MG tablet 5/16/2021 at Unknown time  Yes Yes   Sig: Take 20 mg by mouth every morning    carvedilol (COREG) 3.125 MG tablet 5/16/2021 at Unknown time  Spouse/Significant Other Yes Yes   Sig: Take 3.125 mg by mouth 2 times daily (with meals)   dicyclomine (BENTYL) 20 MG tablet 5/16/2021 at Unknown time  Yes Yes   Sig: Take 20 mg by mouth 2 times daily   ferrous sulfate (FE TABS) 325 (65 Fe) MG EC tablet 5/16/2021 at Unknown time  Yes Yes   Sig: Take 325 mg by mouth 2 times daily   furosemide (LASIX) 20 MG tablet 5/16/2021 at Unknown time  Yes Yes   Sig: Take 20 mg by mouth daily   insulin glargine (LANTUS PEN) 100 UNIT/ML pen 5/16/2021 at Unknown time  Yes Yes   Sig: Inject 16 Units Subcutaneous every morning    lactulose (CEPHULAC) 10 GM packet 5/16/2021 at Unknown time  No Yes   Sig: Take 1 packet (10 g) by mouth 2 times daily   metFORMIN (GLUCOPHAGE) 500 MG tablet 5/16/2021 at Unknown time  Yes Yes   Sig: Take 500 mg by mouth 2 times daily (with meals)   omeprazole (PRILOSEC) 20 MG DR capsule 5/16/2021 at Unknown time  Yes Yes   Sig: Take 20 mg by mouth 2 times daily   spironolactone (ALDACTONE) 100 MG tablet 5/16/2021 at Unknown time  Yes Yes   Sig: Take 100 mg by mouth daily      Facility-Administered Medications: None     Allergies   No Known Allergies    Social History   I have updated and reviewed the following Social History Narrative:   Social History     Social History Narrative    He used to work in glass installation       Family History   I have reviewed this patient's family history and updated it with pertinent information if needed.   Family History   Problem Relation Age of Onset     Heart Disease Father        Review of Systems   The 10 point Review of Systems is negative other than noted in the HPI or here.    Physical Exam   Temp:  [96.3  F (35.7  C)-96.9  F (36.1  C)] 96.3  F (35.7  C)  Pulse:  [58-89] 73  Resp:  [13-26] 20  BP: (107-165)/(44-81) 128/56  SpO2:  [92 %-98 %] 95 %  0 lbs 0 oz  Exam:  GENERAL APPEARANCE:  Alert, in no distress, cooperative  ENT:  Mouth and posterior oropharynx normal, moist mucous membranes, normal hearing  acuity  EYES:  EOM, conjunctivae, lids, pupils and irises normal  RESP:  respiratory effort and palpation of chest normal, no respiratory distress  CV:  Palpation and auscultation of heart done , regular rate and rhythm, no murmur, rub, or gallop, peripheral edema 1+ in bilateral lower extremities  ABDOMEN:  normal bowel sounds, soft, nontender, no hepatosplenomegaly or other masses  SKIN:  multiple areas of bruising  PSYCH:  oriented X 3, affect and mood normal    Delirium Screen/CAM:  Delirium = (#1 and #2 = YES) + (#3 and/or #4)   1) Acute onset and fluctuating course:   No   (acute change in mental status from baseline over last 24 hours)  2) Inattention:   No   (difficulty focusing, distractible, can't follow conversation)  3) Disorganized thinking:   No   (score only if #1 and #2 are YES)  (rambling/irrelevant conversation, unclear/illogical thoughts, inconsistency)  4) Altered level of consciousness:   No   (score only if #1 and #2 are YES)  (other than alert, calm, cooperative)    Delirium/CAM score: 0/4  Interpretation:  1)  Delirium:  Absent  Data   Results for orders placed or performed during the hospital encounter of 05/17/21 (from the past 24 hour(s))   UA with Microscopic reflex to Culture    Specimen: Catheterized Urine   Result Value Ref Range    Color Urine Yellow     Appearance Urine Clear     Glucose Urine Negative NEG^Negative mg/dL    Bilirubin Urine Negative NEG^Negative    Ketones Urine Trace (A) NEG^Negative mg/dL    Specific Gravity Urine 1.018 1.003 - 1.035    Blood Urine Moderate (A) NEG^Negative    pH Urine 7.0 5.0 - 7.0 pH    Protein Albumin Urine 20 (A) NEG^Negative mg/dL    Urobilinogen mg/dL >12.0 (H) 0.0 - 2.0 mg/dL    Nitrite Urine Negative NEG^Negative    Leukocyte Esterase Urine Negative NEG^Negative    Source Catheterized Urine     WBC Urine 3 0 - 5 /HPF    RBC Urine 111 (H) 0 - 2 /HPF    Mucous Urine Present (A) NEG^Negative /LPF    Hyaline Casts 1 0 - 2 /LPF   Ammonia   Result  Value Ref Range    Ammonia 153 (HH) 10 - 50 umol/L   Blood gas venous   Result Value Ref Range    Ph Venous 7.52 (H) 7.32 - 7.43 pH    PCO2 Venous 27 (L) 40 - 50 mm Hg    PO2 Venous 74 (H) 25 - 47 mm Hg    Bicarbonate Venous 22 21 - 28 mmol/L    Base Deficit Venous 0.3 mmol/L    FIO2 RMA    CBC with platelets differential   Result Value Ref Range    WBC 2.6 (L) 4.0 - 11.0 10e9/L    RBC Count 3.13 (L) 4.4 - 5.9 10e12/L    Hemoglobin 9.7 (L) 13.3 - 17.7 g/dL    Hematocrit 30.1 (L) 40.0 - 53.0 %    MCV 96 78 - 100 fl    MCH 31.0 26.5 - 33.0 pg    MCHC 32.2 31.5 - 36.5 g/dL    RDW 17.6 (H) 10.0 - 15.0 %    Platelet Count 53 (L) 150 - 450 10e9/L    Diff Method Automated Method     % Neutrophils 68.7 %    % Lymphocytes 14.1 %    % Monocytes 14.1 %    % Eosinophils 2.3 %    % Basophils 0.4 %    % Immature Granulocytes 0.4 %    Nucleated RBCs 0 0 /100    Absolute Neutrophil 1.8 1.6 - 8.3 10e9/L    Absolute Lymphocytes 0.4 (L) 0.8 - 5.3 10e9/L    Absolute Monocytes 0.4 0.0 - 1.3 10e9/L    Absolute Eosinophils 0.1 0.0 - 0.7 10e9/L    Absolute Basophils 0.0 0.0 - 0.2 10e9/L    Abs Immature Granulocytes 0.0 0 - 0.4 10e9/L    Absolute Nucleated RBC 0.0    Comprehensive metabolic panel   Result Value Ref Range    Sodium 142 133 - 144 mmol/L    Potassium 3.9 3.4 - 5.3 mmol/L    Chloride 115 (H) 94 - 109 mmol/L    Carbon Dioxide 21 20 - 32 mmol/L    Anion Gap 6 3 - 14 mmol/L    Glucose 105 (H) 70 - 99 mg/dL    Urea Nitrogen 19 7 - 30 mg/dL    Creatinine 0.87 0.66 - 1.25 mg/dL    GFR Estimate 84 >60 mL/min/[1.73_m2]    GFR Estimate If Black >90 >60 mL/min/[1.73_m2]    Calcium 8.1 (L) 8.5 - 10.1 mg/dL    Bilirubin Total 2.3 (H) 0.2 - 1.3 mg/dL    Albumin 2.4 (L) 3.4 - 5.0 g/dL    Protein Total 5.9 (L) 6.8 - 8.8 g/dL    Alkaline Phosphatase 373 (H) 40 - 150 U/L    ALT 75 (H) 0 - 70 U/L    AST 65 (H) 0 - 45 U/L   INR   Result Value Ref Range    INR 1.49 (H) 0.86 - 1.14   Lactic acid whole blood   Result Value Ref Range    Lactic Acid  1.6 0.7 - 2.0 mmol/L   Magnesium   Result Value Ref Range    Magnesium 1.6 1.6 - 2.3 mg/dL   Troponin I   Result Value Ref Range    Troponin I ES <0.015 0.000 - 0.045 ug/L   TSH   Result Value Ref Range    TSH 3.18 0.40 - 4.00 mU/L   ABO/Rh type and screen   Result Value Ref Range    ABO O     RH(D) Pos     Antibody Screen Neg     Test Valid Only At [facility]        Specimen Expires 05/20/2021    Gastroenterology IP Consult: HE, recent TIPS; Consultant may enter orders: Yes; Patient to be seen: Routine - within 24 hours; Requested Clinic/Group: [facility]; Requesting provider? Hospitalist (if different from attending physicia...    Narrative    Dominic Kuhn MD     5/17/2021  3:43 PM  Gastroenterology Consultation      Ramirez Leslie MRN# [id number]   YOB: 1945 Age: 76 year old   Date of Admission: 5/17/2021     Reason for consult: I was asked by Casi Cohen to evaluate   this patient for encephalopathy.               History of Present Illness:   This patient is a 76 year old male who presents with altered   mentation.  He has a history of cirrhosis related to fatty liver   and had a TIPS May 5 because of recurrent pleural fluid   accumulation.  He was seen in GI clinic last week and there were   no concerns.  According to the admission note there have been   changes in behavior and thinking for a week, with a big change   the past two days.  He relates that he was admitted with   weakness.  He was obviously encephalopathic in the ER.  He denies   pain or a fever.  His appetite has not been great for a while.    It is not clear to me that he was on lactulose as an outpatient.    He was on it in April after an episode of encephalopathy related   to an infection.  He reports regular bowel movements.  He does   not like the taste of lactulose.                Past Medical History:     Past Medical History:   Diagnosis Date     Cirrhosis (H)      HTN  (hypertension)      Nephrolithiasis      Subdural hematoma (H)      Type II diabetes mellitus (H)     insulin dependent             Past Surgical History:     Past Surgical History:   Procedure Laterality Date     APPENDECTOMY       HC LAP,ORCHIECTOMY Left      LITHOTRIPSY                 Social History:     Social History     Socioeconomic History     Marital status:      Spouse name: Not on file     Number of children: Not on file     Years of education: Not on file     Highest education level: Not on file   Occupational History     Not on file   Social Needs     Financial resource strain: Not on file     Food insecurity     Worry: Not on file     Inability: Not on file     Transportation needs     Medical: Not on file     Non-medical: Not on file   Tobacco Use     Smoking status: Former Smoker     Packs/day: 2.00     Years: 40.00     Pack years: 80.00     Start date: 1957     Quit date: 2007     Years since quittin.9     Smokeless tobacco: Never Used   Substance and Sexual Activity     Alcohol use: Not on file     Drug use: Not on file     Sexual activity: Not on file   Lifestyle     Physical activity     Days per week: Not on file     Minutes per session: Not on file     Stress: Not on file   Relationships     Social connections     Talks on phone: Not on file     Gets together: Not on file     Attends [employer] service: Not on file     Active member of club or organization: Not on file     Attends meetings of clubs or organizations: Not on file     Relationship status: Not on file     Intimate partner violence     Fear of current or ex partner: Not on file     Emotionally abused: Not on file     Physically abused: Not on file     Forced sexual activity: Not on file   Other Topics Concern     Not on file   Social History Narrative    He used to work in glass installation             Family History:     Family History   Problem Relation Age of Onset     Heart Disease Father               Allergies:    No Known Allergies          Medications:     No current outpatient medications on file.             Review of Systems:   10-point review of systems negative except as noted in HPI.            Physical Exam:   Vitals were reviewed  BP (!) 156/64 (BP Location: Left arm)   Pulse 67   Temp 96.3  F   (35.7  C) (Oral)   Resp 18   SpO2 96%   Constitutional:   No distress, stated president is Yaima, knew month is May, did   not know the date and stated he was at [employer]     Heent:   NC/AT, sclera anicteric     Neck:   No adenopathy, thyroid not palpable   Respiratory:   CTA anteriorly     Cardiovascular:   Irregular rate with systolic murmur     Gastrointestinal:   Active BS, soft, NT/ND     Extremities:   No clubbing, cyanosis or edema   Neuro:   + asterixis     Skin:   No rashes or ecchymoses   Psychiatry:        No evidence of anxiety or depression         Data:     ROUTINE IP LABS  BMP  Last Comprehensive Metabolic Panel:  Sodium   Date Value Ref Range Status   05/17/2021 142 133 - 144 mmol/L Final     Potassium   Date Value Ref Range Status   05/17/2021 3.9 3.4 - 5.3 mmol/L Final     Chloride   Date Value Ref Range Status   05/17/2021 115 (H) 94 - 109 mmol/L Final     Carbon Dioxide   Date Value Ref Range Status   05/17/2021 21 20 - 32 mmol/L Final     Anion Gap   Date Value Ref Range Status   05/17/2021 6 3 - 14 mmol/L Final     Glucose   Date Value Ref Range Status   05/17/2021 105 (H) 70 - 99 mg/dL Final     Urea Nitrogen   Date Value Ref Range Status   05/17/2021 19 7 - 30 mg/dL Final     Creatinine   Date Value Ref Range Status   05/17/2021 0.87 0.66 - 1.25 mg/dL Final     GFR Estimate   Date Value Ref Range Status   05/17/2021 84 >60 mL/min/[1.73_m2] Final     Comment:     Non  GFR Calc  Starting 12/18/2018, serum creatinine based estimated GFR (eGFR)   will be   calculated using the Chronic Kidney Disease Epidemiology   Collaboration   (CKD-EPI) equation.       Calcium   Date  Value Ref Range Status   05/17/2021 8.1 (L) 8.5 - 10.1 mg/dL Final       CBC  Lab Results   Component Value Date    WBC 2.6 05/17/2021     Lab Results   Component Value Date    RBC 3.13 05/17/2021     Lab Results   Component Value Date    HGB 9.7 05/17/2021     Lab Results   Component Value Date    HCT 30.1 05/17/2021     No components found for: MCT  Lab Results   Component Value Date    MCV 96 05/17/2021     Lab Results   Component Value Date    MCH 31.0 05/17/2021     Lab Results   Component Value Date    MCHC 32.2 05/17/2021     Lab Results   Component Value Date    RDW 17.6 05/17/2021     Lab Results   Component Value Date    PLT 53 05/17/2021       INR  Lab Results   Component Value Date    INR 1.49 05/17/2021       PANCREASNo lab results found in last 7 days.  LFT  Recent Labs   Lab 05/17/21  0837   PROTTOTAL 5.9*   ALBUMIN 2.4*   BILITOTAL 2.3*   ALKPHOS 373*   AST 65*   ALT 75*                Assessment and Plan:   Hepatic encephalopathy is commonly worsened by a TIPS.  There is   no other likely reason for his mental status change and his   ammonia is elevated.  He likely is going to need chronic   medication for the encephalopathy.  One option is higher doses of   lactulose, with titration for 2-3 stools daily, or rifaximin -   alone or with less lactulose.  I suspect he will be much improved   by tomorrow.      Saroj Kuhn MD  [facility]  Office:  [phone]   Glucose by meter   Result Value Ref Range    Glucose 110 (H) 70 - 99 mg/dL   Glucose by meter   Result Value Ref Range    Glucose 108 (H) 70 - 99 mg/dL   Glucose by meter   Result Value Ref Range    Glucose 123 (H) 70 - 99 mg/dL   Glucose by meter   Result Value Ref Range    Glucose 100 (H) 70 - 99 mg/dL   Comprehensive metabolic panel   Result Value Ref Range    Sodium 145 (H) 133 - 144 mmol/L    Potassium 3.7 3.4 - 5.3 mmol/L    Chloride 117 (H) 94 - 109 mmol/L    Carbon Dioxide 21 20 - 32 mmol/L    Anion Gap 7 3 - 14 mmol/L     Glucose 84 70 - 99 mg/dL    Urea Nitrogen 14 7 - 30 mg/dL    Creatinine 0.88 0.66 - 1.25 mg/dL    GFR Estimate 84 >60 mL/min/[1.73_m2]    GFR Estimate If Black >90 >60 mL/min/[1.73_m2]    Calcium 8.1 (L) 8.5 - 10.1 mg/dL    Bilirubin Total 2.7 (H) 0.2 - 1.3 mg/dL    Albumin 2.4 (L) 3.4 - 5.0 g/dL    Protein Total 5.9 (L) 6.8 - 8.8 g/dL    Alkaline Phosphatase 341 (H) 40 - 150 U/L    ALT 62 0 - 70 U/L    AST 52 (H) 0 - 45 U/L   CBC with platelets   Result Value Ref Range    WBC 2.5 (L) 4.0 - 11.0 10e9/L    RBC Count 3.21 (L) 4.4 - 5.9 10e12/L    Hemoglobin 10.0 (L) 13.3 - 17.7 g/dL    Hematocrit 30.4 (L) 40.0 - 53.0 %    MCV 95 78 - 100 fl    MCH 31.2 26.5 - 33.0 pg    MCHC 32.9 31.5 - 36.5 g/dL    RDW 17.7 (H) 10.0 - 15.0 %    Platelet Count 54 (L) 150 - 450 10e9/L   Glucose by meter   Result Value Ref Range    Glucose 79 70 - 99 mg/dL

## 2021-05-18 NOTE — CONSULTS
Care Management Initial Consult    General Information  Assessment completed with: Patient,    Type of CM/SW Visit: Initial Assessment    Primary Care Provider verified and updated as needed: Yes   Readmission within the last 30 days: no previous admission in last 30 days      Reason for Consult: discharge planning  Advance Care Planning:            Communication Assessment  Patient's communication style: spoken language (English or Bilingual)    Hearing Difficulty or Deaf: no   Wear Glasses or Blind: yes    Cognitive  Cognitive/Neuro/Behavioral: WDL  Level of Consciousness: alert  Arousal Level: opens eyes spontaneously  Orientation: oriented x 4  Mood/Behavior: calm, cooperative  Best Language: 0 - No aphasia  Speech: clear, rambling    Living Environment:   People in home: child(keith), adult, spouse     Current living Arrangements: house      Able to return to prior arrangements: yes       Family/Social Support:  Care provided by: self  Provides care for: no one  Marital Status:   Wife, Children          Description of Support System: Supportive, Involved    Support Assessment: Adequate family and caregiver support    Current Resources:   Patient receiving home care services:  no     Community Resources:  none  Equipment currently used at home: none              Lifestyle & Psychosocial Needs:        Socioeconomic History     Marital status:      Spouse name: Not on file     Number of children: Not on file     Years of education: Not on file     Highest education level: Not on file     Tobacco Use     Smoking status: Former Smoker     Packs/day: 2.00     Years: 40.00     Pack years: 80.00     Start date: 1957     Quit date: 2007     Years since quittin.9     Smokeless tobacco: Never Used     Care Management Discharge Note    Discharge Date: 21       Discharge Disposition: Home    Discharge Services: None    Discharge DME: None    Discharge Transportation: family or friend will  provide      Additional Information:  CM consulted for discharge planning. Pt noted to have discharge orders to home in place. Per MD, pt has no needs for home. Met with pt at bedside to discuss plan of care. Pt is alert and oriented. He verifies that he lives at home with his wife and daughter. He does not have any home services. He is independent with all activity and cares. His daughter manages his medications at home. He states he will be back on his lactulose at home and will be starting a new medication called rifaximin. Discussed importance of pt taking his meds as recommended. He states his daughter is in charge and she will make sure he is taking his meds. Pt states he has no needs at home and has no concerns about discharging back home with his daughter and wife. He will make his own follow up appts. His wife will be picking him up for transportation home today.     No needs identified.       Jeniffer Gomes RN BSN CM  Inpatient Care Coordination  [facility]  [phone]

## 2021-06-30 NOTE — ED NOTES
"[facility]  ED Nurse Handoff Report    Ramirez Leslie is a 76 year old male   ED Chief complaint: Altered Mental Status  . ED Diagnosis:   Final diagnoses:   Hepatic encephalopathy (H)     Allergies: No Known Allergies    Code Status: Last update on 5/17/21 has both Full code and DNI listed   Activity level - Baseline/Home:  Independent uses walker at home. Activity Level - Current:   Assist X 2. Lift room needed: No. Bariatric: No   Needed: No   Isolation: No. Infection: Not Applicable.     Vital Signs:   Vitals:    06/30/21 1002 06/30/21 1015 06/30/21 1045 06/30/21 1100   BP: (!) 148/73 129/62 123/66 (!) 156/64   Pulse: 79 70 74 72   Resp: 14 17 20 19   Temp: 97.9  F (36.6  C)      TempSrc: Oral      SpO2: 100% 99%  99%   Weight: 80.3 kg (177 lb)          Cardiac Rhythm:  ,      Pain level:    Patient confused: Yes. Patient Falls Risk: Yes.   Elimination Status: Unable to void yet    Patient Report - Initial Complaint: Altered Mental Status. Focused Assessment: HPI per MD note: \"Ramirez Leslie is a 76 year old male with a history of alcohol abuse, cirrhosis with ascites, hepatic encephalopathy, Hepatitis A, diabetes, hypertension, Atrial fibulation, CHF and prostate cancer who presents with altered mental status. EMS reports that the patient was found in the bathroom this morning by family confused and acting strange. They state that was holding the shower curtain and trying to button it like a shirt. EMS reports that there was dried flood on the floor outside the bathroom and smeared stool on the toilet and patient's hands. The patient states that he has not been taking his lactulose. The patient was recently seen at his primary care physician yesterday for fatigue over the last month.\" Pt alert in ED, oriented to self and place. Pt denies any complaints. Family reports pt has been taking his medications up until today.   Tests Performed: labs, CT. Abnormal Results: .  Labs Ordered " and Resulted from Time of ED Arrival Up to the Time of Departure from the ED   CBC WITH PLATELETS DIFFERENTIAL - Abnormal; Notable for the following components:       Result Value    Absolute Lymphocytes 0.5 (*)     All other components within normal limits   INR - Abnormal; Notable for the following components:    INR 1.46 (*)     All other components within normal limits   COMPREHENSIVE METABOLIC PANEL - Abnormal; Notable for the following components:    Chloride 112 (*)     Glucose 106 (*)     Bilirubin Total 1.7 (*)     Albumin 2.7 (*)     Protein Total 6.5 (*)     All other components within normal limits   AMMONIA - Abnormal; Notable for the following components:    Ammonia 144 (*)     All other components within normal limits   CBC WITH PLATELETS DIFFERENTIAL - Abnormal; Notable for the following components:    WBC 2.7 (*)     RBC Count 3.47 (*)     Hemoglobin 11.4 (*)     Hematocrit 32.5 (*)     RDW 16.2 (*)     Platelet Count 46 (*)     Absolute Lymphocytes 0.5 (*)     All other components within normal limits   LACTIC ACID WHOLE BLOOD   TROPONIN I   ALCOHOL ETHYL   SARS-COV-2 (COVID-19) VIRUS RT-PCR   GLUCOSE MONITOR NURSING POCT   ROUTINE UA WITH MICROSCOPIC REFLEX TO CULTURE   PERIPHERAL IV CATHETER   CARDIAC CONTINUOUS MONITORING   ABO/RH TYPE AND SCREEN   .  CT Head w/o Contrast   Preliminary Result   IMPRESSION:      1. No evidence of acute intracranial hemorrhage, mass, or herniation.   2. Diffuse parenchymal volume loss and white matter changes likely due   to chronic microvascular ischemic disease.      .   Treatments provided: IV fluids  Family Comments: Family contacted by phone, pt alone in ED. Daughter would like regular updates while pt is hospitalized, she will update pt's wife.   OBS brochure/video discussed/provided to patient:  No  ED Medications:   Medications   lidocaine 1 % 0.1-1 mL (has no administration in time range)   lidocaine (LMX4) cream (has no administration in time range)  "  sodium chloride (PF) 0.9% PF flush 3 mL (has no administration in time range)   sodium chloride (PF) 0.9% PF flush 3 mL (has no administration in time range)   lactulose (CHRONULAC) solution 20 g (has no administration in time range)   0.9% sodium chloride BOLUS (1,000 mLs Intravenous New Bag 6/30/21 1016)     Drips infusing:  No  For the majority of the shift, the patient's behavior Green. Interventions performed were n/a.    Sepsis treatment initiated: No     Patient tested for COVID 19 prior to admission: YES    ED Nurse Name/Phone Number: Polly \"Sonia\" DONTRELL Robertson,   11:04     RECEIVING UNIT ED HANDOFF REVIEW    Above ED Nurse Handoff Report was reviewed: Yes  Reviewed by: Aileen Krishna RN on June 30, 2021 at 12:50 PM     "

## 2021-06-30 NOTE — LETTER
Transition Communication Hand-off for Care Transitions to Next Level of Care Provider    Name: Ramirez Leslie  : 1945  MRN #: [id number]  Primary Care Provider: TORSTEN OJEDA     Primary Clinic: [facility] [address]  [facility] [address]     Reason for Hospitalization:  Hepatic encephalopathy (H) [K72.90]  Admit Date/Time: 2021  9:52 AM  Discharge Date: 21  Payor Source: Payor: HU[employer] / Plan: HU[employer] MEDICARE ADVANTAGE / Product Type: Medicare /     Readmission Assessment Measure (ANUPAMA) Risk Score/category: 27%           Reason for Communication Hand-off Referral: Non-adherence to plan of care related to:  Other question for non compliance with medications    Discharge Plan:home       Concern for non-adherence with plan of care:   Yes  Discharge Needs Assessment:  Needs      Most Recent Value   Equipment Currently Used at Home  none          Any outstanding tests or procedures:        Referrals     Future Labs/Procedures    Medication Therapy Management Referral     Process Instructions:        This referral will be filtered to a centralized scheduling office at [facility] Therapy Management and the patient will receive a call to schedule an appointment at a [place] location most convenient for them.    Comments:    MTM referral reason            Patient has Lactulose or Rifaxamin as a PTA Med or a Discharge medication         This service is designed to help you get the most from your medications.  A specially trained pharmacist will work closely with you and your doctors  to solve any problems related to your medications and to help you get the   best results from taking them.      The Medication Therapy Management staff will call you to schedule an appointment.                  Key Recommendations: Pt admitted with hepatic encephalopathy, noted to have unplanned readmission risk of 25%.  Pt was recently admitted 21 for the same reason. Pt lives at  home with his wife and daughter. From chart review pts trina is in charge and makes sure he takes his medications, up until yesterday she states he has been taking his medications. MD states there are some suspicion or concerns regarding noncompliance with medications at home.  Please follow up with pt and family with medication compliance.     Chayito Valadez RN    AVS/Discharge Summary is the source of truth; this is a helpful guide for improved communication of patient story

## 2021-06-30 NOTE — ED NOTES
Daughter (Korin) updated by phone. Daughter states this morning was the first time pt refused to take his meds, has been cooperative up until now. Pt had some confusion yesterday with asking repeated questions but it was not bad until this morning.

## 2021-06-30 NOTE — ED TRIAGE NOTES
Patient bought to the ER by EMS for evaluation of confusion.    Patient lives at home with his wife, he has a history liver failure with elevated ammonia in the past.  Family reports increased confusion of the past week especially worse of the past three days.  This morning his wife found him in the shower holding the shower curtain and seeming like he was trying to button it like a shirt.    Paramedics noted stool on the outside of the toilet bowel and some dried blood on the floor outside the bathroom.  Patient has a skin at the base of his right middle finger.  Patient is oriented to self, not date or full situation.  He is talking, moves all extremities and follows commands.    ABCs intact.  Patient is alert and oriented x3.

## 2021-06-30 NOTE — H&P
[facility]  Hospitalist Admission Note  Name: Ramirez Leslie    MRN: [id number]  YOB: 1945    Age: 76 year old  Date of admission: 6/30/2021  Primary care provider: Richi Kay            Assessment and Plan:   Ramirez Leslie is a 76 year old male history of end-stage liver disease, alcohol liver cirrhosis, prior ascites, previous hepatic hydrothorax, previous hospitalizations for hepatic encephalopathy, prostate cancer, nephrolithiasis, SDH in 2017, insulin requiring diabetes mellitus, prior TI PS, who lives at home with the family and presented here by emergency personnel due to alteration in mental state with confusion, disorientation, noted by family at home that has been worsening at least in the past 3 days duration.    1.  Hepatic encephalopathy  2.  History of end-stage liver disease  3.  Prior TIPS 5/5/2021  4.  Atrial fibrillation, currently NSR, not on chronic anticoagulation with history of thrombocytopenia  5.  Stable pancytopenia secondary to alcohol liver cirrhosis  6.  Hypertension stable  7.  Insulin requiring diabetes mellitus, controlled with last A1c back in May at 5.6    Admit as inpatient.  Risk for clinical deterioration.  Resume lactulose 3 times a day with goals of stooling of 3 times per day, resume rifaximin 550 mg twice daily dosing  Check UA.  No clear evidence of any signs and symptomatology of ongoing infectious process  -There are some suspicion or concerns regarding noncompliance with medications at home.  -Patient's family through his daughter reiterated that Nash has not been drinking at least for the past several years  -Fall precautions  -May have regular diet  -Recheck ammonia level  -Neurochecks  -Resume home regimen for hypertension, diabetes mellitus, insulin sliding scale and glucose monitoring  -Avoid narcotics, opioids if able as this can further precipitate and worsen his encephalopathy      Code status: DNR/DNI consistent with his  prior directives as seen on most recent POLST  Admit to inpatient  Prophylaxis: Mechanical prophylaxis  Disposition: To be determined, hopeful for home discharge but likely will be needing at least 2 inpatient hospitalization days          Chief Complaint:   Mental status changes, confusion, disorientation       Source of Information:   Patient with poor reliability  Family with conversation over the phone  Discussion with ED physician  Review of E chart records         History of Present Illness:   Ramirez Leslie is a 76 year old male history of end-stage liver disease, alcohol liver cirrhosis, prior ascites, previous hepatic hydrothorax, previous hospitalizations for hepatic encephalopathy, prostate cancer, nephrolithiasis, SDH in 2017, insulin requiring diabetes mellitus, prior TI PS, who lives at home with the family and presented here by emergency personnel due to alteration in mental state with confusion, disorientation, noted by family at home that has been worsening at least in the past 3 days duration.  Family mentioned that there is no reports of EtOH intake, cannot recall if Nash is having at least 3 stooling's per day, hard to a certain really regarding compliance with his medications.  However there was no report of any recent travel, sick contacts, fevers, chills, vomiting, bleeding tendencies such as coffee-ground emesis, bright red blood per rectum, melanotic stools, no reported urinary symptoms, no back pain, sore throat, focal weakness seen by family.   Upon presentation in the emergency room he was found with stable hemodynamics, afebrile, not requiring any oxygen supplementation.  Further investigation revealed hyperammonemia at 144, stable electrolytes, stable pancytopenia, CT of the head did not show any acute pathology.  He was provided with lactulose, IV fluid support and pending urine analysis.  His case was referred to us for further evaluation and care hence this hospitalization.          Past Medical History:     Past Medical History:   Diagnosis Date     Cirrhosis (H)      HTN (hypertension)      Nephrolithiasis      Subdural hematoma (H)      Type II diabetes mellitus (H)     insulin dependent             Past Surgical History:     Past Surgical History:   Procedure Laterality Date     APPENDECTOMY       HC LAP,ORCHIECTOMY Left      LITHOTRIPSY               Social History:     Social History     Tobacco Use     Smoking status: Former Smoker     Packs/day: 2.00     Years: 40.00     Pack years: 80.00     Start date: 1957     Quit date: 2007     Years since quittin.0     Smokeless tobacco: Never Used   Substance Use Topics     Alcohol use: Not on file             Family History:   Family history was fully reviewed and non-contributory in this case.         Allergies:   No Known Allergies          Medications:     Prior to Admission medications    Medication Sig Last Dose Taking? Auth Provider   atorvastatin (LIPITOR) 20 MG tablet Take 20 mg by mouth every evening  2021 at Unknown time Yes Unknown, Entered By History   carvedilol (COREG) 3.125 MG tablet Take 3.125 mg by mouth 2 times daily (with meals) 2021 at Unknown time Yes Unknown, Entered By History   dicyclomine (BENTYL) 20 MG tablet Take 20 mg by mouth 2 times daily 2021 at Unknown time Yes Unknown, Entered By History   ferrous sulfate (FE TABS) 325 (65 Fe) MG EC tablet Take 325 mg by mouth 2 times daily 2021 at Unknown time Yes Unknown, Entered By History   lactulose (CEPHULAC) 10 GM packet Take 1 packet (10 g) by mouth 3 times daily 2021 at Unknown time Yes Patrice Morgan MD   metFORMIN (GLUCOPHAGE) 500 MG tablet Take 500 mg by mouth 2 times daily (with meals) 2021 at Unknown time Yes Unknown, Entered By History   omeprazole (PRILOSEC) 20 MG DR capsule Take 20 mg by mouth 2 times daily 2021 at Unknown time Yes Unknown, Entered By History   XIFAXAN 550 MG TABS tablet Take 550 mg by mouth 2 times  daily 6/29/2021 at Unknown time Yes Unknown, Entered By History   furosemide (LASIX) 20 MG tablet Take 20 mg by mouth daily   Unknown, Entered By History   insulin glargine (LANTUS PEN) 100 UNIT/ML pen Inject 16 Units Subcutaneous every morning    Shankar Fair MD   spironolactone (ALDACTONE) 100 MG tablet Take 100 mg by mouth daily   Unknown, Entered By History             Review of Systems:   A Comprehensive greater than 10 system review of systems was carried out.  Pertinent positives and negatives are noted above.  Otherwise negative for contributory information.           Physical Exam:   Blood pressure (!) 148/63, pulse 64, temperature 97.9  F (36.6  C), temperature source Oral, resp. rate 21, weight 80.3 kg (177 lb), SpO2 100 %.  Wt Readings from Last 1 Encounters:   06/30/21 80.3 kg (177 lb)     Exam:  GENERAL: No apparent distress. Awake, alert, not in distress, disoriented to time place and person  HEENT: Normocephalic, atraumatic. Extraocular movements intact.  CARDIOVASCULAR: Regular rate and rhythm without murmurs or rubs. No JVD, bilateral pitting edema +1 in both ankles  PULMONARY: Clear to auscultation, no wheezes, crackles  ABDOMINAL: Soft, non-tender, non-distended. Bowel sounds normoactive. No hepatosplenomegaly.  EXTREMITIES: No cyanosis or clubbing.  NEUROLOGICAL: CN 2-12 grossly intact, awake and alert x3, spontaneous and coherent speech. no focal neurological deficits.  DERMATOLOGICAL: Several hematomas, ecchymosis seen on extremities  Psych: not agitation, not combative, pleasant mood           Data:   EKG: Normal sinus rhythm at 69 bpm    Imaging:  Recent Results (from the past 48 hour(s))   CT Head w/o Contrast    Narrative    CT SCAN OF THE HEAD WITHOUT CONTRAST   6/30/2021 10:44 AM     HISTORY: Mental status change, unknown cause.    TECHNIQUE:  Axial images of the head and coronal reformations without  IV contrast material. Radiation dose for this scan was reduced using  automated  exposure control, adjustment of the mA and/or kV according  to patient size, or iterative reconstruction technique.    COMPARISON: 5/17/2021.    FINDINGS: There is no evidence of intracranial hemorrhage, mass, acute  infarct or anomaly. The ventricles are normal in size, shape and  configuration. Mild diffuse parenchymal volume loss. Mild-to-moderate  scattered patchy cerebral white matter hypodensities which are  nonspecific, but likely related to chronic microvascular ischemic  disease. Scattered intracranial atherosclerotic calcifications.    Mild scattered mucosal thickening in the visualized aspects of the  paranasal sinuses. The mastoid and middle ear cavities are clear. The  bony calvarium and bones of the skull base appear intact. Partially  visualized degenerative change at the median atlantoaxial joint.      Impression    IMPRESSION:     1. No evidence of acute intracranial hemorrhage, mass, or herniation.  2. Diffuse parenchymal volume loss and white matter changes likely due  to chronic microvascular ischemic disease.    YESI WEINBERG MD          SYSTEM ID:  [id number]       Labs:  No results for input(s): CULT in the last 168 hours.  Recent Labs   Lab 06/30/21  1005   WBC 2.7*  Canceled, Test credited, specimen discarded   HGB 11.4*  Canceled, Test credited, specimen discarded   HCT 32.5*  Canceled, Test credited, specimen discarded   MCV 94  Canceled, Test credited, specimen discarded   PLT 46*  Canceled, Test credited, specimen discarded     Recent Labs   Lab 06/30/21  1005      POTASSIUM 4.3   CHLORIDE 112*   CO2 22   ANIONGAP 6   *   BUN 19   CR 1.03   GFRESTIMATED 71   GFRESTBLACK 82   ADIS 8.7   PROTTOTAL 6.5*   ALBUMIN 2.7*   BILITOTAL 1.7*   ALKPHOS 125   AST 37   ALT 28     No results for input(s): SED, CRP in the last 168 hours.  Recent Labs   Lab 06/30/21  1005   *     Recent Labs   Lab 06/30/21  1005   INR 1.46*     No results for input(s): LIPASE in the last 168  hours.  Recent Labs   Lab 06/30/21  1005   TROPI <0.015     No results for input(s): COLOR, APPEARANCE, URINEGLC, URINEBILI, URINEKETONE, SG, UBLD, URINEPH, PROTEIN, UROBILINOGEN, NITRITE, LEUKEST, RBCU, WBCU in the last 168 hours.

## 2021-06-30 NOTE — PLAN OF CARE
Pt arrived to room from ED at 1615, initiallly calm and cooperative, however pt became increasingly agitated and impulsive/resistant to help while trying to void. PSC was initiated and pt has been redirectable since.  Oriented to self only,Up A1 with gb and walker.  IV locked.  Meds taken whole, lactulose given.  Voiding adequate amounts.  Denies pain. Pt hopeful to discharge home soon.

## 2021-06-30 NOTE — ED PROVIDER NOTES
History     Chief Complaint:  Altered Mental Status    The history is provided by the EMS personnel. The history is limited by the condition of the patient.      Ramirez Leslie is a 76 year old male with a history of alcohol abuse, cirrhosis with ascites, hepatic encephalopathy, Hepatitis A, diabetes, hypertension, Atrial fibulation, CHF and prostate cancer who presents with altered mental status. EMS reports that the patient was found in the bathroom this morning by family confused and acting strange. They state that was holding the shower curtain and trying to button it like a shirt. EMS reports that there was dried flood on the floor outside the bathroom and smeared stool on the toilet and patient's hands. The patient states that he has not been taking his lactulose. The patient was recently seen at his primary care physician yesterday for fatigue over the last month. Labs were drawn at this time, see below.     BASIC METABOLIC PANEL (06/29/2021 11:48 AM CDT)  BASIC METABOLIC PANEL (06/29/2021 11:48 AM CDT)   Component Value Ref Range Performed At [facility] Signature   SODIUM 139 135 - 145 mmol/L [facility]     POTASSIUM 5.0 3.5 - 5.0 mmol/L [facility]     CHLORIDE 111 (H) 98 - 110 mmol/L [facility]     CO2,TOTAL 18 (L) 21 - 31 mmol/L [facility]     ANION GAP 10 5 - 18  [facility]     GLUCOSE 148 (H) 65 - 100 mg/dL [facility]     CALCIUM 9.1 8.5 - 10.5 mg/dL [facility]     BUN 19 8 - 25 mg/dL [facility]     CREATININE 1.00 0.72 - 1.25 mg/dL [facility]     BUN/CREAT RATIO  19 10 - 20  [facility]     GFR if African American >60 >60 ml/min/1.73m2 [facility]  LABORATORY     GFR if not African American >60 >60 ml/min/1.73m2 [facility]      Component Value Ref Range Performed At Pathologist Signature   WHITE BLOOD COUNT  3.3 (L) 4.5 - 11.0 thou/cu mm [facility]     RED BLOOD COUNT  3.42 (L) 4.30 - 5.90 mil/cu mm [facility]     HEMOGLOBIN  10.9 (L) 13.5 - 17.5 g/dL [facility]     HEMATOCRIT  32.6 (L) 37.0 - 53.0 % [facility]     MCV  95 80 - 100 fL [facility]     MCH  31.9 26.0 - 34.0 pg [facility]     MCHC  33.4 32.0 - 36.0 g/dL [facility]     RDW  16.8 (H) 11.5 - 15.5 % [facility]     PLATELET COUNT  48 (L) 140 - 440 thou/cu mm [facility]     MPV  10.2 6.5 - 11.0 fL [facility]        Review of Systems   Unable to perform ROS: Mental status change   Constitutional: Positive for fatigue.   Psychiatric/Behavioral: Positive for behavioral problems and confusion.         Allergies:  The patient has no known allergies.    Medications:    Lipitor   Coreg   Bentyl   Lasix   Insulin   Lactulose   Metformin   Prilosec   Spironolactone     Past Medical History:    Cirrhosis with ascites   Hypertension   Subdural hematoma   Diabetes   Hepatic encephalopathy   Atrial fibulation   Alcohol abuse   CHF   Prostate cancer   Hepatitis A  Kidney stones      Past Surgical History:    Appendectomy   Lithotripsy X2   Orchiectomy left   Knee arthroscopy  Prostatectomy     Cystoscopy     Family History:    CAD- father   Cerebral Hemorrhage- mother     Social History:  Former smoker   Alcohol abuse     Physical Exam     Patient Vitals for the past 24 hrs:   BP Temp Temp src Pulse Resp SpO2 Weight   06/30/21 1215 -- -- -- 64 21 100 % --   06/30/21 1200 (!)  148/63 -- -- 73 11 100 % --   06/30/21 1145 (!) 150/68 -- -- 72 21 99 % --   06/30/21 1115 (!) 157/77 -- -- 75 16 99 % --   06/30/21 1100 (!) 156/64 -- -- 72 19 99 % --   06/30/21 1045 123/66 -- -- 74 20 -- --   06/30/21 1015 129/62 -- -- 70 17 99 % --   06/30/21 1002 (!) 148/73 97.9  F (36.6  C) Oral 79 14 100 % 80.3 kg (177 lb)       Physical Exam  Constitutional:       Appearance: He is well-developed.   HENT:      Right Ear: External ear normal.      Left Ear: External ear normal.      Mouth/Throat:      Mouth: Mucous membranes are moist.      Pharynx: Oropharynx is clear. No oropharyngeal exudate.   Eyes:      General: No scleral icterus.     Conjunctiva/sclera: Conjunctivae normal.      Pupils: Pupils are equal, round, and reactive to light.   Neck:      Musculoskeletal: Normal range of motion and neck supple.      Vascular: No JVD.   Cardiovascular:      Rate and Rhythm: Normal rate and regular rhythm.      Heart sounds: Normal heart sounds. No murmur. No friction rub. No gallop.    Pulmonary:      Effort: Pulmonary effort is normal. No respiratory distress.      Breath sounds: Normal breath sounds. No wheezing or rales.   Abdominal:      General: Bowel sounds are normal. There is no distension.      Palpations: Abdomen is soft. There is no mass.      Tenderness: There is no abdominal tenderness.   Musculoskeletal: Normal range of motion.   Skin:     General: Skin is warm and dry.      Capillary Refill: Capillary refill takes less than 2 seconds.      Findings: No rash.   Neurological:      Mental Status: He is alert.      Comments: Unable to answer orientation questions. HAILE x 4 normally           Emergency Department Course   ECG:  ECG taken at 1012, ECG read at 1012  Normal sinus rhythm    Normal ECG  Rate 69 bpm. SD interval 174 ms. QRS duration 100 ms. QT/QTc 432/462 ms. P-R-T axes -11 51 29.     Imaging:    CT head without contrast:  1. No evidence of acute intracranial hemorrhage, mass, or herniation.    2. Diffuse parenchymal volume loss and white matter changes likely due   to chronic microvascular ischemic disease.  Reading per radiology    Laboratory:    ABO RH Type and Screen: O, antibody negative     CMP: Cl 112 (H) glucose 106 (H) bilirubin 1.7(H) albumin 2.7 (L) protein total 6.5 (L)  o/w WNL (Creatinine 1.03)     Lactic acid (result time 1026) 1.9     Troponin (Collected 1002): <0.015    Ammonia: 144 (HH)     Alcohol ethyl: <0.01     CBC: WBC 2.7(L), HGB 11.4(L), PLT 46(LL)     Asymptomatic COVID19 Virus PCR by nasopharyngeal swab: negative     Procedures:    Emergency Department Course:    Reviewed:  I reviewed nursing notes, vitals, past history and care everywhere    Assessments:  0955 I obtained history and examined the patient as noted above.     1247 I returned to check on patient.      Consults:   1201 I spoke with Dr. Garcia of the Hospitalist service from [facility] regarding patient's presentation, findings, and plan of care.    Interventions:  1016 NS, 1 L, IV     1150 Lactulose 20 mg oral     Disposition:  The patient was admitted to the hospital under the care of Dr. Garcia.    Impression & Plan      Medical Decision Making:  Ramirez Leslie is a 76 year old male who presents with confusion and is unable to give me orientation problem. There is no evidence of serious injury but his ammonia is quite elevated consistent with the suspicion that he likely has hepatic encephalopathy. It sounds like patient has not been taking his meds at home. He was given lacto ulose here. Stable for admission to Dr. Garcia on med-surg.     Covid-19  Ramirez Leslie was evaluated during a global COVID-19 pandemic, which necessitated consideration that the patient might be at risk for infection with the SARS-CoV-2 virus that causes COVID-19.   Applicable protocols for evaluation were followed during the patient's care.   COVID-19 was considered as part of the patient's evaluation. The plan for  testing is:  a test was obtained during this visit.    Diagnosis:    ICD-10-CM    1. Hepatic encephalopathy (H)  K72.90 CBC with platelets differential     INR     ABO/Rh type and screen     Comprehensive metabolic panel     Lactic acid whole blood     Troponin I     Ammonia     Alcohol ethyl     Alcohol ethyl     CBC with platelets differential     CBC with platelets differential     Asymptomatic SARS-CoV-2 COVID-19 Virus (Coronavirus) by PCR     CANCELED: Alcohol ethyl       Scribe Disclosure:  Amaya BALTAZAR, am serving as a scribe at 9:56 AM on 6/30/2021 to document services personally performed by Meg Hancock MD based on my observations and the provider's statements to me.      Meg Hancock MD  06/30/21 2931

## 2021-07-01 NOTE — PLAN OF CARE
"[id number] RN- Alert, oriented x4, mild forgetful. Pleasant mood. BM this evening. 1 assist with walking to the bathroom. Tolerated well. No pain. Abd rounded. \"I just want to take the lactulose so I can go home tomorrow.\" blood sugar checks. Declined supper \"not hungry.\" bed alarm on for safety. Fall precautions.   "

## 2021-07-01 NOTE — CONSULTS
Pt admitted with hepatic encephalopathy, noted to have unplanned readmission risk of 25%.  Pt was recently admitted 5/17/21 for the same reason. Pt lives at home with his wife and daughter. From chart review pts trina is in charge and makes sure he takes his medications, up until yesterday she states he has been taking his medications. MD states there are some suspicion or concerns regarding noncompliance with medications at home.  Handoff will be given to PCP clinic at discharge.     Will continue to follow patient for any additional discharge needs.     Chayito Valadez RN, BSN CTS  Care Coordinator  [facility]   [phone]

## 2021-07-01 NOTE — PLAN OF CARE
Pt A&O, mildly forgetful this shift.  Up SBA, PSC present in room day shift, discontinued at 1500.  Vitals monitored.  Initially refused lantus and lactulose, took afternoon dose of lactulose as pt wishes to return home.  BG monitored, ins pen not available for lunch, will recheck and dose for dinner.  Voiding adequate amounts, stooling x 1 today.  Denies pain.  Spouse updated on POC.

## 2021-07-01 NOTE — PHARMACY-ADMISSION MEDICATION HISTORY
Admission medication history interview status for this patient is complete. See [facility] admission navigator for allergy information, prior to admission medications and immunization status.     Medication history interview done, indicate source(s): Patient and patient's daughter Korin   Medication history resources pill bottles,  Pharmacy: [facility]     Changes made to PTA medication list:  Added: none   Deleted: none   Changed: none     Actions taken by pharmacist (provider contacted, etc):  Left reminder for provider to review med history     Additional medication history information:Patient's daughter confirmed that he takes Lasix 20 mg daily and Spironolactone 100 mg daily. Patient confirmed that he takes Lantus 16 units daily - last dose 6/29 am     Medication reconciliation/reorder completed by provider prior to medication history?  No             Prior to Admission medications    Medication Sig Last Dose Taking? Auth Provider   atorvastatin (LIPITOR) 20 MG tablet Take 20 mg by mouth every evening  6/29/2021 at Unknown time Yes Unknown, Entered By History   carvedilol (COREG) 3.125 MG tablet Take 3.125 mg by mouth 2 times daily (with meals) 6/29/2021 at Unknown time Yes Unknown, Entered By History   dicyclomine (BENTYL) 20 MG tablet Take 20 mg by mouth 2 times daily 6/29/2021 at Unknown time Yes Unknown, Entered By History   ferrous sulfate (FE TABS) 325 (65 Fe) MG EC tablet Take 325 mg by mouth 2 times daily 6/29/2021 at Unknown time Yes Unknown, Entered By History   furosemide (LASIX) 20 MG tablet Take 20 mg by mouth daily 6/29/2021 at Unknown time Yes Unknown, Entered By History   insulin glargine (LANTUS PEN) 100 UNIT/ML pen Inject 16 Units Subcutaneous every morning  6/29/2021 at Unknown time Yes Shankar Fair MD   lactulose (CEPHULAC) 10 GM packet Take 1 packet (10 g) by mouth 3 times daily 6/29/2021 at Unknown time Yes Patrice Morgan MD   metFORMIN (GLUCOPHAGE) 500 MG tablet Take 500 mg by mouth 2 times  daily (with meals) 6/29/2021 at Unknown time Yes Unknown, Entered By History   omeprazole (PRILOSEC) 20 MG DR capsule Take 20 mg by mouth 2 times daily 6/29/2021 at Unknown time Yes Unknown, Entered By History   spironolactone (ALDACTONE) 100 MG tablet Take 100 mg by mouth daily 6/29/2021 at Unknown time Yes Unknown, Entered By History   XIFAXAN 550 MG TABS tablet Take 550 mg by mouth 2 times daily 6/29/2021 at Unknown time Yes Unknown, Entered By History

## 2021-07-01 NOTE — PROGRESS NOTES
[facility]  Hospitalist Progress Note  Beni Garcia MD, MD 07/01/2021  (Text Page)  Reason for Stay (Diagnosis): Mental status changes, hepatic encephalopathy         Assessment and Plan:      Summary of Stay: Ramirez Leslie is a 76 year old male history of end-stage liver disease, alcohol liver cirrhosis, prior ascites, previous hepatic hydrothorax, previous hospitalizations for hepatic encephalopathy, prostate cancer, nephrolithiasis, SDH in 2017, insulin requiring diabetes mellitus, prior TI PS, who lives at home with the family and presented here by emergency personnel due to alteration in mental state with confusion, disorientation, noted by family at home that has been worsening at least in the past 3 days duration.     1.  Hepatic encephalopathy  2.  History of end-stage liver disease  3.  Prior TIPS 5/5/2021  4.  Atrial fibrillation, currently NSR, not on chronic anticoagulation with history of thrombocytopenia  5.  Stable pancytopenia secondary to alcohol liver cirrhosis  6.  Hypertension stable  7.  Insulin requiring diabetes mellitus, controlled with last A1c back in May at 5.6    Nash is improving  -He is more interactive, pleasant and reportedly tolerating oral diet  -Continue current lactulose.  Hopefully he does not keep refusing this as goals of 3 stooling per day to prevent further issues of recurrent hepatic encephalopathy  Continuing rifaximin  Still no clear evidence or signs of symptoms of underlying infectious process  -Has bedside  as patient remains still impulsive  -Avoid narcotics, benzodiazepines, opioids if able  Home regimen for diabetes resume with insulin sliding scale  -UA showed no significant pyuria or bacteriuria      DVT Prophylaxis: Pneumatic Compression Devices  Code Status: DNR / DNI  Discharge Dispo: home  Estimated Disch Date / # of Days until Disch: 1-2 days        Interval History (Subjective):      Continuing care today.  Seen and  examined.  Chart reviewed.  Case discussed with nursing service.  Left a voicemail with patient's daughter  Nash is significantly improved today as compared to yesterday.  As he is more talkative, interactive and participatory.  He was able to demonstrate tolerance with physical activity with nursing service earlier.  No reported chest pain, nausea, vomiting.  Afebrile.  Not requiring any oxygen support.  However earlier it was mentioned that he was refusing his lactulose.     # Pain Assessment:  Current Pain Score 6/30/2021   Patient currently in pain? denies   Pain score (0-10) -   Ramirez avendano pain level was assessed and he currently denies pain.                  Physical Exam:      Last Vital Signs:  /45 (BP Location: Right arm)   Pulse 77   Temp 96.9  F (36.1  C) (Temporal)   Resp 18   Wt 80.3 kg (177 lb)   SpO2 95%   BMI 28.57 kg/m      I/O last 3 completed shifts:  In: 1000 [IV Piggyback:1000]  Out: 550 [Urine:550]  Wt Readings from Last 1 Encounters:   06/30/21 80.3 kg (177 lb)     Vitals:    06/30/21 1002   Weight: 80.3 kg (177 lb)       Constitutional: Awake, alert, cooperative, no apparent distress   Respiratory: Clear to auscultation bilaterally, no crackles or wheezing   Cardiovascular: Regular rate and rhythm, normal S1 and S2, and bilateral nonpitting edema lower extremities   Abdomen: Normal bowel sounds, soft, non-distended, non-tender   Skin: No rashes, no cyanosis, dry to touch   Neuro: Alert and oriented x2, no weakness, spontaneous and coherent speech   Extremities: No edema, normal range of motion   Other(s): Euthymic mood, not agitated       All other systems: Negative          Medications:      All current medications were reviewed with changes reflected in problem list.         Data:      All new lab and imaging data was reviewed.   Labs:  No results for input(s): CULT in the last 168 hours.  Recent Labs   Lab 07/01/21  0637 06/30/21  1005   WBC 2.5* 2.7*  Canceled, Test credited,  specimen discarded   HGB 10.4* 11.4*  Canceled, Test credited, specimen discarded   HCT 30.2* 32.5*  Canceled, Test credited, specimen discarded   MCV 94 94  Canceled, Test credited, specimen discarded   PLT 41* 46*  Canceled, Test credited, specimen discarded     Recent Labs   Lab 07/01/21  0637 06/30/21  2326 06/30/21  1005     --  140   POTASSIUM 3.8  --  4.3   CHLORIDE 115*  --  112*   CO2 22  --  22   ANIONGAP 5  --  6   GLC 81  --  106*   BUN 17  --  19   CR 0.95  --  1.03   GFRESTIMATED 78  --  71   GFRESTBLACK >90  --  82   ADIS 8.2*  --  8.7   MAG  --  1.9  --    PROTTOTAL 5.9*  --  6.5*   ALBUMIN 2.4*  --  2.7*   BILITOTAL 2.1*  --  1.7*   ALKPHOS 112  --  125   AST 32  --  37   ALT 24  --  28     No results for input(s): SED, CRP in the last 168 hours.  Recent Labs   Lab 07/01/21  1200 07/01/21  0838 07/01/21  0637 07/01/21  0203 06/30/21  2204 06/30/21  1742 06/30/21  1005   GLC  --   --  81  --   --   --  106*   * 81  --  110* 166* 80  --      Recent Labs   Lab 06/30/21  1005   INR 1.46*     Recent Labs   Lab 06/30/21  1005   TROPI <0.015     No results for input(s): COLOR, APPEARANCE, URINEGLC, URINEBILI, URINEKETONE, SG, UBLD, URINEPH, PROTEIN, UROBILINOGEN, NITRITE, LEUKEST, RBCU, WBCU in the last 168 hours.   Imaging:   Results for orders placed or performed during the hospital encounter of 06/30/21   CT Head w/o Contrast    Narrative    CT SCAN OF THE HEAD WITHOUT CONTRAST   6/30/2021 10:44 AM     HISTORY: Mental status change, unknown cause.    TECHNIQUE:  Axial images of the head and coronal reformations without  IV contrast material. Radiation dose for this scan was reduced using  automated exposure control, adjustment of the mA and/or kV according  to patient size, or iterative reconstruction technique.    COMPARISON: 5/17/2021.    FINDINGS: There is no evidence of intracranial hemorrhage, mass, acute  infarct or anomaly. The ventricles are normal in size, shape  and  configuration. Mild diffuse parenchymal volume loss. Mild-to-moderate  scattered patchy cerebral white matter hypodensities which are  nonspecific, but likely related to chronic microvascular ischemic  disease. Scattered intracranial atherosclerotic calcifications.    Mild scattered mucosal thickening in the visualized aspects of the  paranasal sinuses. The mastoid and middle ear cavities are clear. The  bony calvarium and bones of the skull base appear intact. Partially  visualized degenerative change at the median atlantoaxial joint.      Impression    IMPRESSION:     1. No evidence of acute intracranial hemorrhage, mass, or herniation.  2. Diffuse parenchymal volume loss and white matter changes likely due  to chronic microvascular ischemic disease.    YESI WEINBERG MD          SYSTEM ID:  [id number]

## 2021-07-01 NOTE — PLAN OF CARE
Pt is A/Ox4. Pt has a sitter due to impulse. Vital signs monitored. Pt denies pain. CMS intact. Pt up with Ax1. Voiding. Two loose stools this shift. Plan is to go home at discharge.

## 2021-07-02 NOTE — PLAN OF CARE
Pt up assist of 1, walker, and gait belt. LS clear, BS present, passing flatus. LBM 7/1/21. Neuro's intact but forgetful at times. Lactolose given last evening. Pt hoping to discharge to home today.

## 2021-07-02 NOTE — PROGRESS NOTES
Would you like pt to have full 16 units of lantus this AM, did not receive yesterday and blood sugars and been in the 80s-low 100s overnight? blood sugar 91 this AM Please advise. Thanks

## 2021-07-02 NOTE — PLAN OF CARE
A/O x 4, forgetful.  VSS, afebrile.  Denies pain.  Up with A1 and gait belt ambulating in room and knott.  Voiding adequately.  Pt taking lactulose as scheduled, but no BMs this shift.  Tolerating regular diet, blood glucose monitoring.  Possible discharge to home tomorrow pending ammonia level.  Will continue to monitor.

## 2021-07-02 NOTE — PROGRESS NOTES
[facility]  Hospitalist Progress Note  Beni Garcia MD, MD 07/02/2021  (Text Page)  Reason for Stay (Diagnosis): Mental status changes, hepatic encephalopathy         Assessment and Plan:      Summary of Stay: Ramirez Leslie is a 76 year old male history of end-stage liver disease, alcohol liver cirrhosis, prior ascites, previous hepatic hydrothorax, previous hospitalizations for hepatic encephalopathy, prostate cancer, nephrolithiasis, SDH in 2017, insulin requiring diabetes mellitus, prior TI PS, who lives at home with the family and presented here by emergency personnel due to alteration in mental state with confusion, disorientation, noted by family at home that has been worsening at least in the past 3 days duration.     1.  Hepatic encephalopathy  2.  History of end-stage liver disease  3.  Prior TIPS 5/5/2021  4.  Atrial fibrillation, currently NSR, not on chronic anticoagulation with history of thrombocytopenia  5.  Stable pancytopenia secondary to alcohol liver cirrhosis  6.  Hypertension stable  7.  Insulin requiring diabetes mellitus, controlled with last A1c back in May at 5.6    Nash is improving  -Ammonia level though remained almost same level as yesterday but clinically appears to be improving as he is more interactive, pleasant and tolerating more of oral diet.  -I discussed with Nash and his wife at bedside importance of compliance with his medications with goals of at least 2-3 stools per day given his end-stage liver disease and hyper disposition for hyperammonemia and hepatic encephalopathy  -Continue current lactulose.  Hopefully he does not keep refusing this as goals of 3 stooling per day to prevent further issues of recurrent hepatic encephalopathy  Continuing rifaximin  Still no clear evidence or signs of symptoms of underlying infectious process  -Has bedside  as patient remains still impulsive  -Avoid narcotics, benzodiazepines, opioids if able  Home  regimen for diabetes resume with insulin sliding scale  -UA showed no significant pyuria or bacteriuria  -I decrease his basal Lantus given not much of oral intake.  We will continue with sliding scale    DVT Prophylaxis: Pneumatic Compression Devices  Code Status: DNR / DNI  Discharge Dispo: home  Estimated Disch Date / # of Days until Disch: 1-2 days        Interval History (Subjective):      Continuing care today.  Seen and examined.  Chart reviewed.  Case discussed with nursing service.    Ramirez wife is present at bedside and provided with updates   He is more interactive, but still very pleasant.  And now agreeing to take his lactulose here in the hospital   Still has low appetite but was able to finish his breakfast tray this morning.    No reported agitation or combativeness.  Stable hemodynamics.    Afebrile.  Not requiring any oxygen support.  .     # Pain Assessment:  Current Pain Score 7/2/2021   Patient currently in pain? denies   Pain score (0-10) -   Ramirez s pain level was assessed and he currently denies pain.                  Physical Exam:      Last Vital Signs:  /54 (BP Location: Left arm)   Pulse 65   Temp 97.1  F (36.2  C) (Temporal)   Resp 18   Wt 78.4 kg (172 lb 14.4 oz)   SpO2 96%   BMI 27.91 kg/m      I/O last 3 completed shifts:  In: 640 [P.O.:640]  Out: -   Wt Readings from Last 1 Encounters:   07/02/21 78.4 kg (172 lb 14.4 oz)     Vitals:    06/30/21 1002 07/02/21 0617   Weight: 80.3 kg (177 lb) 78.4 kg (172 lb 14.4 oz)       Constitutional: Awake, alert, cooperative, no apparent distress   Respiratory: Clear to auscultation bilaterally, no crackles or wheezing   Cardiovascular: Regular rate and rhythm, normal S1 and S2, and bilateral nonpitting edema lower extremities   Abdomen: Normal bowel sounds, soft, non-distended, non-tender   Skin: No rashes, no cyanosis, dry to touch   Neuro: Alert and oriented x2, no weakness, spontaneous and coherent speech   Extremities: No edema,  normal range of motion   Other(s): Euthymic mood, not agitated       All other systems: Negative          Medications:      All current medications were reviewed with changes reflected in problem list.         Data:      All new lab and imaging data was reviewed.   Labs:  No results for input(s): CULT in the last 168 hours.  Recent Labs   Lab 07/01/21  0637 06/30/21  1005   WBC 2.5* 2.7*  Canceled, Test credited, specimen discarded   HGB 10.4* 11.4*  Canceled, Test credited, specimen discarded   HCT 30.2* 32.5*  Canceled, Test credited, specimen discarded   MCV 94 94  Canceled, Test credited, specimen discarded   PLT 41* 46*  Canceled, Test credited, specimen discarded     Recent Labs   Lab 07/02/21  0645 07/01/21 0637 06/30/21  2326 06/30/21  1005   NA  --  142  --  140   POTASSIUM 3.7 3.8  --  4.3   CHLORIDE  --  115*  --  112*   CO2  --  22  --  22   ANIONGAP  --  5  --  6   GLC  --  81  --  106*   BUN  --  17  --  19   CR  --  0.95  --  1.03   GFRESTIMATED  --  78  --  71   GFRESTBLACK  --  >90  --  82   ADIS  --  8.2*  --  8.7   MAG 1.7  --  1.9  --    PROTTOTAL  --  5.9*  --  6.5*   ALBUMIN  --  2.4*  --  2.7*   BILITOTAL  --  2.1*  --  1.7*   ALKPHOS  --  112  --  125   AST  --  32  --  37   ALT  --  24  --  28     No results for input(s): SED, CRP in the last 168 hours.  Recent Labs   Lab 07/02/21  1137 07/02/21  0755 07/02/21  0156 07/01/21  2105 07/01/21  1758 07/01/21  0637 07/01/21 0637 06/30/21  1005 06/30/21  1005   GLC  --   --   --   --   --   --  81  --  106*   * 91 89 114* 85   < >  --    < >  --     < > = values in this interval not displayed.     Recent Labs   Lab 06/30/21  1005   INR 1.46*     Recent Labs   Lab 06/30/21  1005   TROPI <0.015     No results for input(s): COLOR, APPEARANCE, URINEGLC, URINEBILI, URINEKETONE, SG, UBLD, URINEPH, PROTEIN, UROBILINOGEN, NITRITE, LEUKEST, RBCU, WBCU in the last 168 hours.   Imaging:   Results for orders placed or performed during the  hospital encounter of 06/30/21   CT Head w/o Contrast    Narrative    CT SCAN OF THE HEAD WITHOUT CONTRAST   6/30/2021 10:44 AM     HISTORY: Mental status change, unknown cause.    TECHNIQUE:  Axial images of the head and coronal reformations without  IV contrast material. Radiation dose for this scan was reduced using  automated exposure control, adjustment of the mA and/or kV according  to patient size, or iterative reconstruction technique.    COMPARISON: 5/17/2021.    FINDINGS: There is no evidence of intracranial hemorrhage, mass, acute  infarct or anomaly. The ventricles are normal in size, shape and  configuration. Mild diffuse parenchymal volume loss. Mild-to-moderate  scattered patchy cerebral white matter hypodensities which are  nonspecific, but likely related to chronic microvascular ischemic  disease. Scattered intracranial atherosclerotic calcifications.    Mild scattered mucosal thickening in the visualized aspects of the  paranasal sinuses. The mastoid and middle ear cavities are clear. The  bony calvarium and bones of the skull base appear intact. Partially  visualized degenerative change at the median atlantoaxial joint.      Impression    IMPRESSION:     1. No evidence of acute intracranial hemorrhage, mass, or herniation.  2. Diffuse parenchymal volume loss and white matter changes likely due  to chronic microvascular ischemic disease.    YESI WEINBERG MD          SYSTEM ID:  [id number]

## 2021-07-03 NOTE — PLAN OF CARE
Pt up assist of stand by assist. Denies pain. LS clear, BS present, passing flatus. LBM 7/1/21 - No bowel movement noted on 7/2/21. Taking Lactulose as scheduled. Neuro's intact but forgetful at times.  Pt hoping to discharge to home today if meets criteria.

## 2021-07-03 NOTE — PROVIDER NOTIFICATION
DATE:  7/3/2021   TIME OF RECEIPT FROM LAB:  0958  LAB TEST: Ammonia  LAB VALUE:  150 umol/L  RESULTS GIVEN WITH READ-BACK TO (PROVIDER):  Beni Garcia MD  TIME LAB VALUE REPORTED TO PROVIDER:   1000

## 2021-07-03 NOTE — PLAN OF CARE
A/O x 4, forgetful.  VSS, afebrile.  Denies pain.  Up with A1 and gait belt ambulating in room and knott.  Voiding adequately.  Pt taking lactulose as scheduled, but no BMs this shift.  PRN stool softeners given as well.  Tolerating regular diet, blood glucose monitoring.  Wife and daughter at bedside. Possible discharge to home tomorrow pending ammonia level.  Will continue to monitor.

## 2021-07-03 NOTE — PROGRESS NOTES
[facility]  Hospitalist Progress Note  Beni Garcia MD, MD 07/03/2021  (Text Page)  Reason for Stay (Diagnosis): Mental status changes, hepatic encephalopathy         Assessment and Plan:      Summary of Stay: Ramirez Leslie is a 76 year old male history of end-stage liver disease, alcohol liver cirrhosis, prior ascites, previous hepatic hydrothorax, previous hospitalizations for hepatic encephalopathy, prostate cancer, nephrolithiasis, SDH in 2017, insulin requiring diabetes mellitus, prior TI PS, who lives at home with the family and presented here by emergency personnel due to alteration in mental state with confusion, disorientation, noted by family at home that has been worsening at least in the past 3 days duration.     1.  Hepatic encephalopathy  2.  History of end-stage liver disease  3.  Prior TIPS 5/5/2021  4.  Atrial fibrillation, currently NSR, not on chronic anticoagulation with history of thrombocytopenia  5.  Stable pancytopenia secondary to alcohol liver cirrhosis  6.  Hypertension stable  7.  Insulin requiring diabetes mellitus, controlled with last A1c back in May at 5.6    Nash mental state is at baseline however he demonstrated no bowel movement yesterday now his ammonia level is critically high again at more than 150  Low threshold for abdominal x-ray if with abdominal pain, nausea or vomiting  - low suspicion for bowel obstruction acute abscess abdominal pain, nausea, vomiting.  Abdomen is soft.  -Might need lactulose enema if still with no bowel movement and worsening hyperammonemia  -Still tolerating oral diet.  Recommended for ambulation as much as he can tolerate  -Today increase his lactulose dosing  -Continued with rifaximin  -Receiving some stool softeners  Repeat ammonia level in the morning    Still no clear evidence or signs of symptoms of underlying infectious process  -Bedside  has been discontinued  -Avoid narcotics, benzodiazepines, opioids  if able  Home regimen for diabetes resume with insulin sliding scale  -UA showed no significant pyuria or bacteriuria  -I decrease his basal Lantus given not much of oral intake.  We will continue with sliding scale    DVT Prophylaxis: Pneumatic Compression Devices  Code Status: DNR / DNI  Discharge Dispo: home  Estimated Disch Date / # of Days until Disch: 1-2 days        Interval History (Subjective):      Continuing care today.  Seen and examined.  Chart reviewed.  Case discussed with nursing service.    Ramirez daughter is present at bedside and provided with updates   Earlier he is eating a [employer] breakfast and no complaints or issues with alteration in mental state.  However he had no bowel movement in the past 24 hours  Fortunately he is denying any nausea, vomiting, abdominal pain.  Mental state appears to be at baseline.  Afebrile.  Stable hemodynamics.  No cardiorespiratory symptomatology.    Not requiring any oxygen support.  .     # Pain Assessment:  Current Pain Score 7/3/2021   Patient currently in pain? denies   Pain score (0-10) -   Ramirez avendano pain level was assessed and he currently denies pain.                  Physical Exam:      Last Vital Signs:  /43 (BP Location: Left arm)   Pulse 73   Temp 97  F (36.1  C) (Temporal)   Resp 18   Wt 78.2 kg (172 lb 4.8 oz)   SpO2 96%   BMI 27.81 kg/m      I/O last 3 completed shifts:  In: 723 [P.O.:720; I.V.:3]  Out: -   Wt Readings from Last 1 Encounters:   07/03/21 78.2 kg (172 lb 4.8 oz)     Vitals:    06/30/21 1002 07/02/21 0617 07/03/21 0554   Weight: 80.3 kg (177 lb) 78.4 kg (172 lb 14.4 oz) 78.2 kg (172 lb 4.8 oz)       Constitutional: Awake, alert, cooperative, no apparent distress   Respiratory: Clear to auscultation bilaterally, no crackles or wheezing   Cardiovascular: Regular rate and rhythm, normal S1 and S2, and bilateral nonpitting edema lower extremities   Abdomen: Normal bowel sounds, soft, non-distended, non-tender   Skin: No  rashes, no cyanosis, dry to touch   Neuro: Alert and oriented x3, no weakness, spontaneous and coherent speech   Extremities: No edema, normal range of motion   Other(s): Euthymic mood, not agitated       All other systems: Negative          Medications:      All current medications were reviewed with changes reflected in problem list.         Data:      All new lab and imaging data was reviewed.   Labs:  No results for input(s): CULT in the last 168 hours.  Recent Labs   Lab 07/01/21  0637 06/30/21  1005   WBC 2.5* 2.7*  Canceled, Test credited, specimen discarded   HGB 10.4* 11.4*  Canceled, Test credited, specimen discarded   HCT 30.2* 32.5*  Canceled, Test credited, specimen discarded   MCV 94 94  Canceled, Test credited, specimen discarded   PLT 41* 46*  Canceled, Test credited, specimen discarded     Recent Labs   Lab 07/03/21  0612 07/02/21  0645 07/01/21  0637 06/30/21  2326 06/30/21  1005   NA  --   --  142  --  140   POTASSIUM 4.2 3.7 3.8  --  4.3   CHLORIDE  --   --  115*  --  112*   CO2  --   --  22  --  22   ANIONGAP  --   --  5  --  6   GLC  --   --  81  --  106*   BUN  --   --  17  --  19   CR  --   --  0.95  --  1.03   GFRESTIMATED  --   --  78  --  71   GFRESTBLACK  --   --  >90  --  82   ADIS  --   --  8.2*  --  8.7   MAG 1.8 1.7  --  1.9  --    PROTTOTAL  --   --  5.9*  --  6.5*   ALBUMIN  --   --  2.4*  --  2.7*   BILITOTAL  --   --  2.1*  --  1.7*   ALKPHOS  --   --  112  --  125   AST  --   --  32  --  37   ALT  --   --  24  --  28     No results for input(s): SED, CRP in the last 168 hours.  Recent Labs   Lab 07/03/21  0722 07/03/21  0157 07/02/21  2129 07/02/21  1704 07/02/21  1137 07/01/21  0637 07/01/21  0637 06/30/21  1005 06/30/21  1005   GLC  --   --   --   --   --   --  81  --  106*   BGM 85 111* 148* 136* 159*   < >  --    < >  --     < > = values in this interval not displayed.     Recent Labs   Lab 06/30/21  1005   INR 1.46*     Recent Labs   Lab 06/30/21  1005   TROPI <0.015      No results for input(s): COLOR, APPEARANCE, URINEGLC, URINEBILI, URINEKETONE, SG, UBLD, URINEPH, PROTEIN, UROBILINOGEN, NITRITE, LEUKEST, RBCU, WBCU in the last 168 hours.   Imaging:   Results for orders placed or performed during the hospital encounter of 06/30/21   CT Head w/o Contrast    Narrative    CT SCAN OF THE HEAD WITHOUT CONTRAST   6/30/2021 10:44 AM     HISTORY: Mental status change, unknown cause.    TECHNIQUE:  Axial images of the head and coronal reformations without  IV contrast material. Radiation dose for this scan was reduced using  automated exposure control, adjustment of the mA and/or kV according  to patient size, or iterative reconstruction technique.    COMPARISON: 5/17/2021.    FINDINGS: There is no evidence of intracranial hemorrhage, mass, acute  infarct or anomaly. The ventricles are normal in size, shape and  configuration. Mild diffuse parenchymal volume loss. Mild-to-moderate  scattered patchy cerebral white matter hypodensities which are  nonspecific, but likely related to chronic microvascular ischemic  disease. Scattered intracranial atherosclerotic calcifications.    Mild scattered mucosal thickening in the visualized aspects of the  paranasal sinuses. The mastoid and middle ear cavities are clear. The  bony calvarium and bones of the skull base appear intact. Partially  visualized degenerative change at the median atlantoaxial joint.      Impression    IMPRESSION:     1. No evidence of acute intracranial hemorrhage, mass, or herniation.  2. Diffuse parenchymal volume loss and white matter changes likely due  to chronic microvascular ischemic disease.    YESI WEINBERG MD          SYSTEM ID:  [id number]

## 2021-07-04 NOTE — PLAN OF CARE
Pt up assist of 1 with gait belt. Denies pain. LS clear, BS present, passing flatus. LBM 7/3/21. Taking Lactulose as scheduled. Neuro's intact except for pt being disoriented to date and was more forgetful compared to previous nights.  Pt hoping to discharge to home today if meets criteria.

## 2021-07-04 NOTE — PLAN OF CARE
RN from [id number]:  Pt forgetful. Up with SBA. Voiding. Large BM this evening. Regular diet, monitoring BG's. Denies pain. Regular diet. Plans to go home hopefully tomorrow.

## 2021-07-04 NOTE — PROGRESS NOTES
[facility]  Hospitalist Progress Note  Veto Mcclain MD, MD 07/04/2021  Reason for Stay (Diagnosis): Mental status changes, hepatic encephalopathy         Assessment and Plan:      Summary of Stay: Ramirez Leslie is a 76 year old male history of end-stage liver disease, alcohol liver cirrhosis, prior ascites, previous hepatic hydrothorax, previous hospitalizations for hepatic encephalopathy, prostate cancer, nephrolithiasis, SDH in 2017, insulin requiring diabetes mellitus, prior TI PS, who lives at home with the family and presented here by emergency personnel due to alteration in mental state with confusion, disorientation, noted by family at home that has been worsening at least in the past 3 days duration.     1.  Hepatic encephalopathy  -Mental status improving.  States that he had bowel movement today.  Will continue lactulose and rifaximin.  -Ammonia level down from 150 to 124 today.  Will recheck ammonia level tomorrow    2.  History of end-stage liver disease, Prior TIPS 5/5/2021  -Continue lactulose and rifaximin as above for hepatic encephalopathy.    -We will continue Lasix 20 mg p.o. daily, spironolactone 100 mg p.o. daily.  Will monitor electrolytes    4.  Atrial fibrillation, currently NSR, not on chronic anticoagulation with history of thrombocytopenia  -We will continue Coreg 3.125 mg p.o. twice daily    5.  Stable pancytopenia secondary to alcohol liver cirrhosis  -We will monitor CBC  6.  Hypertension stable  -We will continue Coreg. Also on spironolactone and Lasix    7.  Insulin requiring diabetes mellitus, controlled with last A1c back in May at 5.6  -We will monitor.  We will continue Lantus insulin 6 units every morning, insulin sliding scale.    DVT Prophylaxis: Pneumatic Compression Devices  Code Status: DNR / DNI  Discharge Dispo: home  Estimated Disch Date / # of Days until Disch: 1-2 days        Interval History (Subjective):      Patient seen and examined.   Chart reviewed.  He stated that he is feeling better. He is more alert and oriented today.  He has no nausea, vomiting, abdominal pain, dysuria, urgency or frequency.              Physical Exam:      Last Vital Signs:  /50 (BP Location: Left arm)   Pulse 63   Temp 99.6  F (37.6  C) (Temporal)   Resp 16   Wt 78.3 kg (172 lb 9.6 oz)   SpO2 96%   BMI 27.86 kg/m      I/O last 3 completed shifts:  In: 480 [P.O.:480]  Out: -   Wt Readings from Last 1 Encounters:   07/04/21 78.3 kg (172 lb 9.6 oz)     Vitals:    06/30/21 1002 07/02/21 0617 07/03/21 0554 07/04/21 0624   Weight: 80.3 kg (177 lb) 78.4 kg (172 lb 14.4 oz) 78.2 kg (172 lb 4.8 oz) 78.3 kg (172 lb 9.6 oz)       Constitutional: Awake, alert, cooperative, no apparent distress   Respiratory: Clear to auscultation bilaterally, no crackles or wheezing   Cardiovascular: Regular rate and rhythm, normal S1 and S2, and bilateral nonpitting edema lower extremities   Abdomen: Normal bowel sounds, soft, non-distended, non-tender   Skin: No rashes, no cyanosis, dry to touch   Neuro: Alert and oriented x3, no weakness, spontaneous and coherent speech   Extremities: No edema, normal range of motion   Other(s): Euthymic mood, not agitated       All other systems: Negative          Medications:      All current medications were reviewed with changes reflected in problem list.         Data:      All new lab and imaging data was reviewed.   Labs:  No results for input(s): CULT in the last 168 hours.  Recent Labs   Lab 07/01/21  0637 06/30/21  1005   WBC 2.5* 2.7*  Canceled, Test credited, specimen discarded   HGB 10.4* 11.4*  Canceled, Test credited, specimen discarded   HCT 30.2* 32.5*  Canceled, Test credited, specimen discarded   MCV 94 94  Canceled, Test credited, specimen discarded   PLT 41* 46*  Canceled, Test credited, specimen discarded     Recent Labs   Lab 07/04/21  0612 07/03/21  0612 07/02/21  0645 07/01/21  0637 06/30/21  1005 06/30/21  1005      --   --  142  --  140   POTASSIUM 4.2 4.2 3.7 3.8  --  4.3   CHLORIDE 114*  --   --  115*  --  112*   CO2 20  --   --  22  --  22   ANIONGAP 6  --   --  5  --  6   *  --   --  81  --  106*   BUN 14  --   --  17  --  19   CR 0.91  --   --  0.95  --  1.03   GFRESTIMATED 82  --   --  78  --  71   GFRESTBLACK >90  --   --  >90  --  82   ADIS 8.1*  --   --  8.2*  --  8.7   MAG 1.9 1.8 1.7  --    < >  --    PROTTOTAL  --   --   --  5.9*  --  6.5*   ALBUMIN  --   --   --  2.4*  --  2.7*   BILITOTAL  --   --   --  2.1*  --  1.7*   ALKPHOS  --   --   --  112  --  125   AST  --   --   --  32  --  37   ALT  --   --   --  24  --  28    < > = values in this interval not displayed.     No results for input(s): SED, CRP in the last 168 hours.  Recent Labs   Lab 07/04/21  0612 07/04/21  0209 07/03/21  2127 07/03/21  1754 07/03/21  1200 07/03/21  0722 07/01/21  0637 07/01/21  0637 06/30/21  1005 06/30/21  1005   *  --   --   --   --   --   --  81  --  106*   BGM  --  106* 165* 120* 173* 85   < >  --    < >  --     < > = values in this interval not displayed.     Recent Labs   Lab 06/30/21  1005   INR 1.46*     Recent Labs   Lab 06/30/21  1005   TROPI <0.015     No results for input(s): COLOR, APPEARANCE, URINEGLC, URINEBILI, URINEKETONE, SG, UBLD, URINEPH, PROTEIN, UROBILINOGEN, NITRITE, LEUKEST, RBCU, WBCU in the last 168 hours.   Imaging:   Results for orders placed or performed during the hospital encounter of 06/30/21   CT Head w/o Contrast    Narrative    CT SCAN OF THE HEAD WITHOUT CONTRAST   6/30/2021 10:44 AM     HISTORY: Mental status change, unknown cause.    TECHNIQUE:  Axial images of the head and coronal reformations without  IV contrast material. Radiation dose for this scan was reduced using  automated exposure control, adjustment of the mA and/or kV according  to patient size, or iterative reconstruction technique.    COMPARISON: 5/17/2021.    FINDINGS: There is no evidence of intracranial hemorrhage,  mass, acute  infarct or anomaly. The ventricles are normal in size, shape and  configuration. Mild diffuse parenchymal volume loss. Mild-to-moderate  scattered patchy cerebral white matter hypodensities which are  nonspecific, but likely related to chronic microvascular ischemic  disease. Scattered intracranial atherosclerotic calcifications.    Mild scattered mucosal thickening in the visualized aspects of the  paranasal sinuses. The mastoid and middle ear cavities are clear. The  bony calvarium and bones of the skull base appear intact. Partially  visualized degenerative change at the median atlantoaxial joint.      Impression    IMPRESSION:     1. No evidence of acute intracranial hemorrhage, mass, or herniation.  2. Diffuse parenchymal volume loss and white matter changes likely due  to chronic microvascular ischemic disease.    YESI WEINBERG MD          SYSTEM ID:  [id number]

## 2021-07-04 NOTE — PLAN OF CARE
Up in room frequent trips to the BR using assist of 1 gait belt and walker ---slightly unsteady ---BG done with insulin as needed ---states 3 stools -- appetite good --denies pain and nausea

## 2021-07-05 NOTE — PHARMACY-RX INSURANCE COVERAGE
Ran a test claim for Xifaxan 550mg tablets.    Patient has pharmacy benefits through [employer]. Per insurance, this medication is covered at a coinsurance of $697.00.    An income-based patient assistance program is offered by the  of Xifaxan.  Patient would qualify if his income is less than $38,640 (single) or $52,260 (). Application can be found at [email or web address]      Please feel free to contact me with any other test claims, prior authorizations, or insurance questions regarding outpatient medications.     Thanks!      Carol Ann Batres CPhT  [place] [facility] Liaison  Pronouns: She/Her/Hers  (covering for Lizz Garcia/[facility] Discharge Pharmacy Liaison)    [facility]  [facility]  [address] [place] [address] [address] [address]   [email or web address]  [email or web address]   Phone: [phone]  Pager: [phone]  Fax: [phone]

## 2021-07-05 NOTE — PROGRESS NOTES
[facility]  Hospitalist Progress Note  Veto Mcclain MD, MD 07/05/2021  Reason for Stay (Diagnosis): Mental status changes, hepatic encephalopathy         Assessment and Plan:      Summary of Stay: Ramirez Leslie is a 76 year old male history of end-stage liver disease, alcohol liver cirrhosis, prior ascites, previous hepatic hydrothorax, previous hospitalizations for hepatic encephalopathy, prostate cancer, nephrolithiasis, SDH in 2017, insulin requiring diabetes mellitus, prior TI PS, who lives at home with the family and presented here by emergency personnel due to alteration in mental state with confusion, disorientation, noted by family at home that has been worsening at least in the past 3 days duration.     1.  Hepatic encephalopathy  -Mental status improving.  States that he had bowel movement today.  Will continue lactulose and rifaximin.  -Ammonia level down from 150-->124-->122 today.  Will recheck ammonia level tomorrow    2.  History of end-stage liver disease, Prior TIPS 5/5/2021  -Continue lactulose and rifaximin as above for hepatic encephalopathy.    -We will continue Lasix 20 mg p.o. daily, spironolactone 100 mg p.o. daily.  Will monitor electrolytes    4.  Atrial fibrillation, currently NSR, not on chronic anticoagulation with history of thrombocytopenia  -We will continue Coreg 3.125 mg p.o. twice daily    5.  Stable pancytopenia secondary to alcohol liver cirrhosis  -We will monitor CBC    6.  Hypertension stable  -We will continue Coreg. Also on spironolactone and Lasix    7.  Insulin requiring diabetes mellitus, controlled with last A1c back in May at 5.6  -We will monitor.  We will continue Lantus insulin 6 units every morning, insulin sliding scale.    DVT Prophylaxis: Pneumatic Compression Devices  Code Status: DNR / DNI  Discharge Dispo: home  Estimated Disch Date / # of Days until Disch: 1-2 days        Interval History (Subjective):      Patient seen and  examined.  Chart reviewed.  He stated that he is feeling better today. Denies nausea, vomiting, abdominal pain, cough, shortness of breath              Physical Exam:      Last Vital Signs:  BP (!) 146/63   Pulse 81   Temp 96.8  F (36  C) (Temporal)   Resp 18   Wt 78.3 kg (172 lb 9.6 oz)   SpO2 98%   BMI 27.86 kg/m      I/O last 3 completed shifts:  In: 650 [P.O.:650]  Out: -   Wt Readings from Last 1 Encounters:   07/04/21 78.3 kg (172 lb 9.6 oz)     Vitals:    06/30/21 1002 07/02/21 0617 07/03/21 0554 07/04/21 0624   Weight: 80.3 kg (177 lb) 78.4 kg (172 lb 14.4 oz) 78.2 kg (172 lb 4.8 oz) 78.3 kg (172 lb 9.6 oz)       Constitutional: Awake, alert, cooperative, no apparent distress   Respiratory: Clear to auscultation bilaterally, no crackles or wheezing   Cardiovascular: Regular rate and rhythm, normal S1 and S2, and bilateral nonpitting edema lower extremities   Abdomen: Normal bowel sounds, soft, non-distended, non-tender   Skin: No rashes, no cyanosis, dry to touch   Neuro: Alert and oriented x3, no weakness, spontaneous and coherent speech   Extremities: No edema, normal range of motion   Other(s): Euthymic mood, not agitated       All other systems: Negative          Medications:      All current medications were reviewed with changes reflected in problem list.         Data:      All new lab and imaging data was reviewed.   Labs:  No results for input(s): CULT in the last 168 hours.  Recent Labs   Lab 07/05/21  0726 07/01/21  0637 06/30/21  1005   WBC 3.6* 2.5* 2.7*  Canceled, Test credited, specimen discarded   HGB 10.8* 10.4* 11.4*  Canceled, Test credited, specimen discarded   HCT 32.5* 30.2* 32.5*  Canceled, Test credited, specimen discarded   MCV 97 94 94  Canceled, Test credited, specimen discarded   PLT 46* 41* 46*  Canceled, Test credited, specimen discarded     Recent Labs   Lab 07/05/21  0726 07/04/21  0612 07/03/21  0612 07/01/21  0637 07/01/21  0637 06/30/21  1005 06/30/21  1005   NA  138 140  --   --  142  --  140   POTASSIUM 4.4 4.2 4.2   < > 3.8  --  4.3   CHLORIDE 112* 114*  --   --  115*  --  112*   CO2 21 20  --   --  22  --  22   ANIONGAP 5 6  --   --  5  --  6   * 101*  --   --  81  --  106*   BUN 15 14  --   --  17  --  19   CR 0.97 0.91  --   --  0.95  --  1.03   GFRESTIMATED 76 82  --   --  78  --  71   GFRESTBLACK 88 >90  --   --  >90  --  82   ADIS 8.2* 8.1*  --   --  8.2*  --  8.7   MAG 1.8 1.9 1.8   < >  --    < >  --    PROTTOTAL 6.2*  --   --   --  5.9*  --  6.5*   ALBUMIN 2.4*  --   --   --  2.4*  --  2.7*   BILITOTAL 1.5*  --   --   --  2.1*  --  1.7*   ALKPHOS 115  --   --   --  112  --  125   AST 30  --   --   --  32  --  37   ALT 27  --   --   --  24  --  28    < > = values in this interval not displayed.     No results for input(s): SED, CRP in the last 168 hours.  Recent Labs   Lab 07/05/21  0731 07/05/21  0726 07/05/21  0200 07/04/21  2227 07/04/21  1545 07/04/21  1209 07/04/21  0612 07/01/21  0637 07/01/21  0637 06/30/21  1005 06/30/21  1005   GLC  --  140*  --   --   --   --  101*  --  81  --  106*   *  --  128* 117* 163* 117*  --    < >  --    < >  --     < > = values in this interval not displayed.     Recent Labs   Lab 06/30/21  1005   INR 1.46*     Recent Labs   Lab 06/30/21  1005   TROPI <0.015     No results for input(s): COLOR, APPEARANCE, URINEGLC, URINEBILI, URINEKETONE, SG, UBLD, URINEPH, PROTEIN, UROBILINOGEN, NITRITE, LEUKEST, RBCU, WBCU in the last 168 hours.   Imaging:   Results for orders placed or performed during the hospital encounter of 06/30/21   CT Head w/o Contrast    Narrative    CT SCAN OF THE HEAD WITHOUT CONTRAST   6/30/2021 10:44 AM     HISTORY: Mental status change, unknown cause.    TECHNIQUE:  Axial images of the head and coronal reformations without  IV contrast material. Radiation dose for this scan was reduced using  automated exposure control, adjustment of the mA and/or kV according  to patient size, or iterative  reconstruction technique.    COMPARISON: 5/17/2021.    FINDINGS: There is no evidence of intracranial hemorrhage, mass, acute  infarct or anomaly. The ventricles are normal in size, shape and  configuration. Mild diffuse parenchymal volume loss. Mild-to-moderate  scattered patchy cerebral white matter hypodensities which are  nonspecific, but likely related to chronic microvascular ischemic  disease. Scattered intracranial atherosclerotic calcifications.    Mild scattered mucosal thickening in the visualized aspects of the  paranasal sinuses. The mastoid and middle ear cavities are clear. The  bony calvarium and bones of the skull base appear intact. Partially  visualized degenerative change at the median atlantoaxial joint.      Impression    IMPRESSION:     1. No evidence of acute intracranial hemorrhage, mass, or herniation.  2. Diffuse parenchymal volume loss and white matter changes likely due  to chronic microvascular ischemic disease.    YESI WEINBERG MD          SYSTEM ID:  [id number]

## 2021-07-05 NOTE — PROVIDER NOTIFICATION
DATE:  7/5/2021   TIME OF RECEIPT FROM LAB:  0805  LAB TEST:  Ammonia  LAB VALUE:  122  RESULTS GIVEN WITH READ-BACK TO (PROVIDER):  {Choose the provider you called     Page to Dr. Mcclain  TIME LAB VALUE REPORTED TO PROVIDER:   0809

## 2021-07-05 NOTE — PLAN OF CARE
Pt A&Ox4 but forgetful. Pt up with SBA of 1 with gait belt. Frequent voiding. Tolerated short walk in [place]. Denies pain. LS clear, BS present, passing flatus. Tolerated Diet. BG monitoring.  and 120. Lantus given. LBM 7/5/21 x2 today.  Taking Lactulose as scheduled. MD aware of critical labs for platelet and Ammonia. No change to POC. Possible discharge to home tomorrow. Wife wants discharge instructions discussed with her when he does discharge.

## 2021-07-05 NOTE — PROVIDER NOTIFICATION
DATE:  7/5/2021   TIME OF RECEIPT FROM LAB:  0755  LAB TEST:  platelet   LAB VALUE:  46  RESULTS GIVEN WITH READ-BACK TO (PROVIDER):  {Choose the provider you called     Dr. Mcclain  TIME LAB VALUE REPORTED TO PROVIDER:   0756

## 2021-07-05 NOTE — PLAN OF CARE
RN from 0725-8247:    A&O x4, but forgetful. SBA. Reg diet, monitoring BG's. Large soft BM. Voiding adequately. Ambulated in halls. Denies pain. Pt hoping to discharge home tomorrow pending labs.

## 2021-07-06 NOTE — PLAN OF CARE
Pt A&Ox4. Is forgetful at times. VSS afebrile RA. Up SBA refused GB. LS clear. One BM today. +BS/flatus. Lactoluse given this am. BG monitoring. Tolerated diet. Pt had no c/o pain or SOB. Ammonia level 96 MD aware and good with pt discharging home today. Discharge medications escribed to his pharmacy. Wife at bedside. Discharge instructions given and discussed questions. Diabetic education materials given also per pt/wife request. Pt left floor at 1153 with all his belongings.

## 2021-07-06 NOTE — DISCHARGE INSTRUCTIONS
Diabetes: Understanding Carbohydrates, Fats, and Protein  Food is a source of fuel and nourishment for your body. It s also a source of pleasure. Having diabetes doesn t mean you have to eat special foods or give up desserts. Instead, your dietitian can show you how to plan meals to suit your body. To start, learn how different foods affect blood sugar.  Carbohydrates  Carbohydrates (carbs) are the main source of fuel for the body. They raise blood sugar. Many people think carbohydrates are only in pasta or bread. But carbohydrates are in many kinds of foods. Carbs include:    Sugars.These are naturally in foods such as fruit, milk, honey, and molasses. Sugars can also be added to many foods. They may be added to cereals and yogurt to candy and desserts. Sugars raise blood sugar.    Starches. These are in bread, cereals, pasta, and dried beans. They re found in corn, peas, potatoes, yam, acorn squash, and butternut squash. Starches raise blood sugar.     Fiber. This is in foods such as vegetables, fruits, beans, and whole grains. Unlike other carbs, fiber isn t digested or absorbed. So it doesn t raise blood sugar. In fact, fiber can help keep blood sugar from rising too fast. It helps keep blood cholesterol at a healthy level.  Did you know?  Even though carbohydrates raise blood sugar, it s best to have some in every meal. They are an important part of a healthy diet.  Fat  Fat is an energy source that can be stored until needed. Fat does not raise blood sugar. But it can raise blood cholesterol. This increases the risk of heart disease. Fat is high in calories. Eating too many calories can cause weight gain. Not all types of fat are the same.  More healthy:    Monounsaturated fats. These are mostly found in vegetable oils, such as olive, canola, and peanut oils. They are found in avocados and some nuts. Monounsaturated fats are healthy for your heart. That s because they lower LDL (unhealthy)  "cholesterol.    Polyunsaturated fats.These are mostly found in vegetable oils, such as corn, safflower, and soybean oils. They are found in some seeds, nuts, and fish. Polyunsaturated fats lower LDL (unhealthy) cholesterol. So, choosing them instead of saturated fats is healthy for your heart. Certain unsaturated fats can help lower triglycerides.   Less healthy:    Saturated fats.These are found in animal products, such as meat, poultry, whole milk, lard, and butter. Saturated fats raise LDL cholesterol.They are not healthy for your heart.    Hydrogenated oilsand trans fats. These are formed when vegetable oils are processed into solid fats. They are found in many processed foods. Hydrogenated oils and trans fats raise LDL (\"bad\") cholesterol and lower HDL (\"good\") cholesterol. They are not healthy for your heart.  Protein  Protein helps the body build and repair muscle and other tissue. Protein has little or no effect on blood sugar. But many foods that have protein also have saturated fat. By choosing low-fat protein sources, you can get the benefits of protein without the extra fat:    Plant protein. This is found in dry beans and peas, nuts, and soy products, such as tofu and soymilk. These foods tend to have no cholesterol.Most are low in saturated fat.    Animal protein. This is found in fish, poultry, meat, cheese, milk, and eggs. These foods have cholesterol.They can be high in saturated fat. Aim for lean, lower-fat choices. Don't eat fried foods.  [employer] last reviewed this educational content on 4/1/2019 2000-2021 [employer]. All rights reserved. This information is not intended as a substitute for professional medical care. Always follow your healthcare professional's instructions.        Diabetes: Meal Planning    You can help keep your blood sugar level in your target range by eating healthy foods. Your healthcare team can help you create a low-fat, nutritious meal plan. Take an " "active role in your diabetes management. Follow your meal plan and work with your healthcare team.  Make your meal plan  A meal plan gives guidelines for the types and amounts of food you should eat. The goal is to balance food and insulin (or other diabetes medicines). That way, your blood sugars will be in your target range. Your dietitian will help you make a flexible meal plan that has many foods that you like.  Watch serving sizes  Your meal plan will group foods by servings. To learn how much a serving is, start by measuring food portions at each meal. Soon you ll know what a serving looks like on your plate. Ask your healthcare provider about how to balance servings of different foods.  Eat from all the food groups  The basis of a healthy meal plan is variety (eating lots of different foods). Choose lean meats, fresh fruits and vegetables, whole grains, and low-fat or nonfat dairy products. Eating a wide variety of foods gives your body the nutrients it needs. It can also keep you from getting bored with your meal plan.  Learn about carbohydrates, fats, and protein    Carbohydrates are starches, sugars, and fiber. They are found in many foods. These include fruit, bread, pasta, milk, and sweets. Of all the foods you eat, carbohydrates have the most effect on your blood sugar. Your dietitian may teach you about carb counting. This is a way to figure out the number of carbohydrates in a meal.    Fats have the most calories. They also have the most effect on your weight and your risk of heart disease. When you have diabetes, it s important to control your weight and protect your heart. Foods that are high in fat include whole milk, cheese, snack foods, and desserts. You can eat more of the \"heart-healthy\" fats such as avocados, salmon, tuna, and olive oil.    Protein is important for building and repairing muscles and bones. Choose low-fat protein sources, such as fish, egg whites, and skinless chicken.  Reduce " liquid sugars  Extra calories from sodas, sports drinks, and fruit drinks make it hard to keep blood sugar in range. Cut as many liquid sugars from your meal plan as you can.  This includes most fruit juices. They are often high in natural or added sugar. Instead, drink plenty of water and other sugar-free beverages.  Eat less fat  If you need to lose weight, try to reduce the amount of fat in your diet. This can also help lower your cholesterol level to keep blood vessels healthier. Cut fat by using only small amounts of liquid oil for cooking. Read food labels carefully. Stay away from foods with unhealthy trans fats.  When it comes to blood sugar control, when you eat is as important as what you eat. You may need to eat several small meals spaced evenly throughout the day to stay in your target range. So don t skip breakfast or wait until late in the day to get most of your calories. Doing so can cause your blood sugar to rise too high or fall too low.  [employer] last reviewed this educational content on 4/1/2019 2000-2021 [employer]. All rights reserved. This information is not intended as a substitute for professional medical care. Always follow your healthcare professional's instructions.      Liver-Brain Disease (Hepatic Encephalopathy)  Symptoms and Treatment  What is hepatic encephalopathy (HE)?  This condition can happen when you have cirrhosis of the liver. The liver can no longer remove toxins from the blood. The toxins build up and travel through the body and affect the brain.   What are the signs that someone has HE?  Signs and symptoms may be different for each person. Sometimes you don't know you have it, and family or friends notice the signs.   Mental signs     Forgetting things, feeling confused    Poor judgment    Trouble focusing on a task    Not knowing who you are or where you are    Odd behavior  Physical signs     A change in sleep habits    Handwriting gets worse    Shaking  of hands or arms. This is more than a simple tremor.    Slurred speech    Slowed movements  What are the stages of HE?  Stage 1: Mild confusion, slow to do tasks, short attention span, changes in sleep habits  Stage 2: Sluggish, confused, strange behavior, slurred speech, changes in personality  Stage 3: Appears to be sleeping, but can be wakened; unaware of time and place; speech cannot be understood  Stage 4: Coma  Treatment  The aim of treatment is to lower the toxins in the blood.   3. Lactulose is used to lower some of the toxins made in the body. It works by flushing toxins out through the bowels and by reducing the amount of toxins made in the bowels.  4. Rifaximin (an antibiotic) aims to restore balance in gut bacteria and reduce toxins.  5. Zinc may help clear toxins from the blood.  6. Finally, a liver transplant may be needed.  Treatment can help control the symptoms, but HE does not get better on its own. The goal of treatment is to improve your well-being and keep you out of the hospital.  What may cause HE to return or get worse?    Not drinking enough fluids (dehydration). Drink plenty of water    Low sodium or potassium levels in the blood (this happens with low fluids)    Bleeding in the stomach or bowels    Infection    Constipation    Drugs such as opioids (oxydocone, hydrocodone)    Kidney failure  Most patients can control or improve their HE. Be sure you take your medicines, track your symptoms and stay in contact with your liver clinic to discuss changes in how your are feeling.  For informational purposes only. Not to replace the advice of your health care provider.   Copyright   2016 [facility]. All rights reserved. [employer] 723024 - 02/16.

## 2021-07-06 NOTE — PLAN OF CARE
Had pt from 19:00-23:00. Pt A/O x 4 w/occasional confusion, VSS, afebrile, on RA. LS clear, denies SOB. Up SBA w/GB. Regular diet, denies nausea, . Voiding in adequate amounts. Denies pain.

## 2021-07-06 NOTE — PLAN OF CARE
Pt up SBA. AxO x4. No complaints of pain. No stools on this shift. Lactic:1.1. Q4 neuro checks. Ammonia: 122. VSS. Possible discharge today 7/6/2021.

## 2021-07-18 PROBLEM — K74.60 CIRRHOSIS OF LIVER (H): Status: ACTIVE | Noted: 2017-02-12

## 2021-07-18 PROBLEM — R51.9 HEADACHE: Status: ACTIVE | Noted: 2017-02-07

## 2021-07-18 PROBLEM — I10 HYPERTENSION: Status: ACTIVE | Noted: 2017-02-07

## 2021-07-18 PROBLEM — Z95.828 S/P TIPS (TRANSJUGULAR INTRAHEPATIC PORTOSYSTEMIC SHUNT): Status: ACTIVE | Noted: 2021-01-01

## 2021-07-18 PROBLEM — D69.6 THROMBOCYTOPENIA (H): Status: ACTIVE | Noted: 2021-01-01

## 2021-07-18 PROBLEM — I50.9 CONGESTIVE HEART FAILURE (H): Status: ACTIVE | Noted: 2021-01-01

## 2021-07-18 PROBLEM — D61.818 PANCYTOPENIA (H): Status: ACTIVE | Noted: 2020-01-01

## 2021-07-18 PROBLEM — S06.5XAA SUBDURAL HEMATOMA (H): Status: ACTIVE | Noted: 2017-02-07

## 2021-07-18 PROBLEM — R79.89 ELEVATED TROPONIN: Status: ACTIVE | Noted: 2021-01-01

## 2021-07-18 PROBLEM — Z71.89 ADVANCE CARE PLANNING: Status: ACTIVE | Noted: 2017-02-07

## 2021-07-18 PROBLEM — K70.31 ALCOHOLIC CIRRHOSIS OF LIVER WITH ASCITES (H): Status: ACTIVE | Noted: 2020-09-01

## 2021-07-18 PROBLEM — R73.9 HYPERGLYCEMIA: Status: ACTIVE | Noted: 2017-02-07

## 2021-07-18 PROBLEM — R11.2 NAUSEA & VOMITING: Status: ACTIVE | Noted: 2017-02-07

## 2021-07-18 PROBLEM — E11.8 TYPE 2 DIABETES MELLITUS WITH COMPLICATION (H): Status: ACTIVE | Noted: 2017-02-09

## 2021-07-18 NOTE — ED NOTES
DATE:  7/17/2021   TIME OF RECEIPT FROM LAB:  2125  LAB TEST:  Lactic   LAB VALUE:  2.1  RESULTS GIVEN WITH READ-BACK TO (PROVIDER):  [facility]  TIME LAB VALUE REPORTED TO PROVIDER:   2125

## 2021-07-18 NOTE — ED NOTES
[facility]  ED Nurse Handoff Report    Ramirez Leslie is a 76 year old male   ED Chief complaint: Abnormal Labs  . ED Diagnosis:   Final diagnoses:   Constipation   Hepatic encephalopathy (H)   Dehydration     Allergies: No Known Allergies    Code Status: DNR / DNI  Activity level - Baseline/Home:  Assist X 2. Activity Level - Current:   Assist X 2. Lift room needed: No. Bariatric: No   Needed: No   Isolation: No. Infection: Not Applicable.     Vital Signs:   Vitals:    07/17/21 2100 07/17/21 2130 07/17/21 2200 07/17/21 2210   BP: 134/68 126/81 123/60    Pulse: 66 67 68    Resp:       Temp:       TempSrc:       SpO2: 99% 99% 99% 99%       Cardiac Rhythm:  ,      Pain level:    Patient confused: Yes. Patient Falls Risk: Yes.   Elimination Status: Has voided   Patient Report - Initial Complaint: Abnormal labs. Focused Assessment: Family states informed that pt had elevated ammonia levels. Pt currently on lactulose with hx of cirrhosis. Family states pt is confused and incontinent of urine. Family states current behavior is normal for pt when ammonia is elevated. ABCs intact    Tests Performed: labs, XR. Abnormal Results:   Labs Ordered and Resulted from Time of ED Arrival Up to the Time of Departure from the ED   AMMONIA - Abnormal; Notable for the following components:       Result Value    Ammonia 89 (*)     All other components within normal limits   COMPREHENSIVE METABOLIC PANEL - Abnormal; Notable for the following components:    Chloride 113 (*)     Glucose 120 (*)     Protein Total 6.7 (*)     Albumin 2.8 (*)     Bilirubin Total 1.7 (*)     All other components within normal limits   ROUTINE UA WITH MICROSCOPIC REFLEX TO CULTURE - Abnormal; Notable for the following components:    Blood Urine Trace (*)     Bacteria Urine Few (*)     Mucus Urine Present (*)     RBC Urine 3 (*)     All other components within normal limits    Narrative:     Urine Culture not indicated  Urine Culture  not indicated  Urine Culture not indicated   LACTIC ACID WHOLE BLOOD - Abnormal; Notable for the following components:    Lactic Acid 2.1 (*)     All other components within normal limits   CBC WITH PLATELETS - Abnormal; Notable for the following components:    WBC Count 3.4 (*)     RBC Count 3.15 (*)     Hemoglobin 10.2 (*)     Hematocrit 29.7 (*)     RDW 16.2 (*)     Platelet Count 59 (*)     All other components within normal limits   LACTIC ACID WHOLE BLOOD - Abnormal; Notable for the following components:    Lactic Acid 2.1 (*)     All other components within normal limits   ETHYL ALCOHOL LEVEL - Normal   MAGNESIUM - Normal   EXTRA BLUE TOP TUBE   EXTRA RED TOP TUBE   RBC AND PLATELET MORPHOLOGY   COVID-19 VIRUS (CORONAVIRUS) BY PCR   EXTRA TUBE    Narrative:     The following orders were created for panel order Extra Tube (Isabel Draw).  Procedure                               Abnormality         Status                     ---------                               -----------         ------                     Extra Blue Top Tube[[id number]]                              Final result               Extra Red Top Tube[[id number]]                               Final result                 Please view results for these tests on the individual orders.     Abdomen XR, 2 vw, flat and upright   Final Result   IMPRESSION: Nonobstructive bowel gas pattern. Moderate volume of colorectal stool. Small calcifications which project over the expected positions of both kidneys  consistent with intrarenal stones. TIPS in position.        .   Treatments provided: pink lady enema, IV fluids  Family Comments: wife at bedside  OBS brochure/video discussed/provided to patient:  Yes  ED Medications:   Medications   lidocaine (XYLOCAINE) 2 % external gel 6 mL (has no administration in time range)   0.9% sodium chloride BOLUS (1,000 mLs Intravenous New Bag 7/18/21 0038)   senna-docusate (SENOKOT-S/PERICOLACE) 8.6-50 MG per tablet 2 tablet  (has no administration in time range)   0.9% sodium chloride BOLUS (1,000 mLs Intravenous New Bag 7/17/21 2108)   pink lady enema (COMPOUNDED: docusate, magnesium citrate, mineral oil, sodium phosphate) (286 mLs Rectal Given 7/17/21 2712)     Drips infusing:  Yes  For the majority of the shift, the patient's behavior Green. Interventions performed were NA.    Sepsis treatment initiated: No     Patient tested for COVID 19 prior to admission: YES    ED Nurse Name/Phone Number: Ling Luther RN,   12:39 AM

## 2021-07-18 NOTE — CONSULTS
Care Management Note    Length of Stay (days) 0    Patient plan of care discussed at Interdisciplinary Rounds: yes      Expected Discharge Date: 7/20/21.       Concerns to be Addressed / Barriers to Discharge: Alteration in mentation, medication management.     Anticipated Discharge Disposition: Home.   Anticipated Discharge Services:  To be determined by destination, patient/family preferences, medical needs and mobility status closer to the time of discharge. Consider PT and OT evaluate and treat.   Anticipated Discharge DME: Per therapy (if indicated).     Plan:  Patient is  and their daughter Korin lives with them. Korin manages most of his care including medication management (although it appears that he tried to manage his own medications which may have led to this hospitalization with elevated ammonia). Family provides assist with activities of daily living. Daughter Korin is primary family contact.     Care Management Initial Consult    General Information  Assessment completed with: Patient, g[place]  Type of CM/SW Visit: Initial Assessment    Primary Care Provider verified and updated as needed: Yes   Readmission within the last 30 days: previous discharge plan unsuccessful   Return Category: Exacerbation of disease  Reason for Consult: discharge planning  Advance Care Planning:            Communication Assessment  Patient's communication style: spoken language (English or Bilingual)    Hearing Difficulty or Deaf: no   Wear Glasses or Blind: yes    Cognitive  Cognitive/Neuro/Behavioral: WDL  Level of Consciousness: alert     Orientation:  (forgetful)  Mood/Behavior: calm, cooperative  Best Language: 0 - No aphasia  Speech: word-finding difficulty    Living Environment:   People in home: child(keith), adult, spouse     Current living Arrangements: house      Able to return to prior arrangements: yes       Family/Social Support:  Care provided by: spouse/significant other, child(keith)  Provides care for: no  one, unable/limited ability to care for self  Marital Status:   Wife, Children (daughter)  august       Description of Support System:           Current Resources:   Patient receiving home care services: No     Community Resources:    Equipment currently used at home: other (see comments) (unknown)  Supplies currently used at home:      Employment/Financial:  Employment Status: retired        Financial Concerns: No concerns identified   Referral to Financial Counselor: No       Lifestyle & Psychosocial Needs:  Social Determinants of Health     Tobacco Use: Medium Risk     Smoking Tobacco Use: Former Smoker     Smokeless Tobacco Use: Never Used   Alcohol Use:      Frequency of Alcohol Consumption:      Average Number of Drinks:      Frequency of Binge Drinking:    Financial Resource Strain:      Difficulty of Paying Living Expenses:    Food Insecurity:      Worried About Running Out of Food in the Last Year:      Ran Out of Food in the Last Year:    Transportation Needs:      Lack of Transportation (Medical):      Lack of Transportation (Non-Medical):    Physical Activity:      Days of Exercise per Week:      Minutes of Exercise per Session:    Stress:      Feeling of Stress :    Social Connections:      Frequency of Communication with Friends and Family:      Frequency of Social Gatherings with Friends and Family:      Attends Rastafari Services:      Active Member of Clubs or Organizations:      Attends Club or Organization Meetings:      Marital Status:    Intimate Partner Violence:      Fear of Current or Ex-Partner:      Emotionally Abused:      Physically Abused:      Sexually Abused:    Depression: Not at risk     PHQ-2 Score: 0   Housing Stability:      Unable to Pay for Housing in the Last Year:      Number of Places Lived in the Last Year:      Unstable Housing in the Last Year:        Functional Status:  Prior to admission patient needed assistance:   Dependent ADLs:: Bathing, Dressing,  Toileting  Dependent IADLs:: Cleaning, Cooking, Laundry, Meal Preparation, Medication Management, Transportation  Assesssment of Functional Status: Not at baseline with mobility    Mental Health Status:  Mental Health Status: No Current Concerns       Chemical Dependency Status:  Chemical Dependency Status: No Current Concerns (currently sober)             Values/Beliefs:  Spiritual, Cultural Beliefs, Denominational Practices, Values that affect care: no               Additional Information:  Discharge goal is home pending response to treatment, medical needs and mobility closer to discharge.     Deepa Downs RN    Abbreviation  Code:  [facility] Wheelchair ([facility] WC), [facility]ret[facility] ([facility] STR), Patient (pt), Transitional Care Unit (TCU), Skilled Nursing Facility (SNF), Long Term Care (LTC), Assisted Living (LEOLA or AL), Care Management (CM), Physical Therapy (PT), Occupational Therapy (OT), Speech Therapy (ST), Respiratory Therapy (RT).

## 2021-07-18 NOTE — PLAN OF CARE
Pt is alert and oriented to self, situation fluctuates. Alarm on for safety, pt doesn't consistently call for assistance. Pt is very cooperative, more steady on feet throughout day, standby assist to bathroom.  Pt has had 2 loose stools today, on lactulose and miralax.  Monitoring ammonia level daily, if trending down then MD may determine appropriate for discharge tomorrow.  Wife of Nash will be coming later today to visit.Sherice Bowens RN

## 2021-07-18 NOTE — ED TRIAGE NOTES
Family states informed that pt had elevated ammonia levels. Pt currently on lactulose with hx of cirrhosis. Family states pt is confused and incontinent of urine. Family states current behavior is normal for pt when ammonia is elevated. ABCs intact

## 2021-07-18 NOTE — PROGRESS NOTES
76-year-old patient with liver cirrhosis, status post TI PS, A. fib, type 2 diabetes, hypertension, prostate cancer, subdural hematoma 2017, kidney stones, presented to the hospital for increased confusion and constipation concerning for hepatic encephalopathy, ammonia level was found to be elevated, abdominal x-ray was consistent with constipation, patient was seen and examined at bedside patient is awake oriented, was getting lactulose, will recheck ammonia level again tomorrow

## 2021-07-18 NOTE — PHARMACY-ADMISSION MEDICATION HISTORY
Pharmacy Note - Admission Medication History    Pertinent Provider Information:  Patient's daughter reported he currently takes furosemide 40mg daily which is different than recent hospital discharge order.     ______________________________________________________________________    Prior To Admission (PTA) med list completed and updated in EMR.       Prior to Admission Medications   Prescriptions Last Dose Informant Patient Reported? Taking?   XIFAXAN 550 MG TABS tablet 7/17/2021 at am  Yes Yes   Sig: Take 550 mg by mouth 2 times daily   atorvastatin (LIPITOR) 20 MG tablet 7/16/2021 at pm  Yes Yes   Sig: Take 20 mg by mouth every evening    carvedilol (COREG) 3.125 MG tablet 7/17/2021 at am Spouse/Significant Other Yes Yes   Sig: Take 3.125 mg by mouth 2 times daily (with meals)   dicyclomine (BENTYL) 20 MG tablet 7/17/2021 at am  Yes Yes   Sig: Take 20 mg by mouth 2 times daily   ferrous sulfate (FE TABS) 325 (65 Fe) MG EC tablet 7/17/2021 at am  Yes Yes   Sig: Take 325 mg by mouth 2 times daily   furosemide (LASIX) 40 MG tablet 7/17/2021 at am  Yes Yes   Sig: Take 40 mg by mouth daily   insulin glargine (LANTUS PEN) 100 UNIT/ML pen 7/17/2021 at am  Yes Yes   Sig: Inject 16 Units Subcutaneous every morning    lactulose 20 GM/30ML SOLN 7/17/2021 at am  No Yes   Sig: Take 30 mLs (20 g) by mouth 3 times daily   metFORMIN (GLUCOPHAGE) 500 MG tablet 7/17/2021 at am  Yes Yes   Sig: Take 500 mg by mouth 2 times daily (with meals)   omeprazole (PRILOSEC) 20 MG DR capsule 7/17/2021 at am  Yes Yes   Sig: Take 20 mg by mouth 2 times daily   senna-docusate (SENOKOT-S/PERICOLACE) 8.6-50 MG tablet Unknown at Unknown time  No No   Sig: Take 1 tablet by mouth daily as needed for constipation   spironolactone (ALDACTONE) 100 MG tablet 7/17/2021 at am  Yes Yes   Sig: Take 100 mg by mouth daily      Facility-Administered Medications: None       Information source(s): Family member, Hospital records and  CareEverywhere/[place]  Method of interview communication: phone    Summary of Changes to PTA Med List  New: none  Discontinued: none  Changed: furosemide (changed from 20mg daily to 40mg daily)    Patient was asked about OTC/herbal products specifically.  PTA med list reflects this.    In the past week, patient estimated taking medication this percent of the time:  greater than 90%.    Allergies were reviewed, assessed, and updated with the patient.      Patient does not use any multi-dose medications prior to admission.    The information provided in this note is only as accurate as the sources available at the time of the update(s).    Thank you for the opportunity to participate in the care of this patient.    Alycia Romero [facility]  7/18/2021 8:03 AM

## 2021-07-18 NOTE — H&P
[facility]    History and Physical - Hospitalist Service       Date of Admission:  7/18/2021    Assessment & Plan      Ramirez Leslie is a 76 year old male admitted on 7/18/2021.     1.  Hepatic encephalopathy  Symptoms appear to be improving  Patient had a bowel movement prior to leaving the ED  Continue lactulose 20 g 3 times a day and rifaximin 550 mg twice a day  Add MiraLAX until frequent bowel movements    2.  Alcohol cirrhosis, end-stage liver disease  Status post TIPS May 5, 2021  Alcohol abstinence for 1 month  No significant signs of ascites  Continue furosemide and spironolactone    3.  Pancytopenia  Secondary to liver disease  Blood counts are stable, monitor    4.  Paroxysmal atrial fibrillation  Rate controlled on carvedilol  Not on anticoagulation secondary to thrombocytopenia and risk for bleeding    5.  Type 2 diabetes mellitus with long-term insulin  Monitor with insulin sliding scale    6.  History of heart failure with preserved ejection fraction  No respiratory symptoms to suggest acute CHF  Continue on beta-blockers and diuretics     Diet: Combination Diet Consistent Carb 75 Grams CHO per Meal Diet; 2 gm NA Diet    DVT Prophylaxis: Low Risk/Ambulatory with no VTE prophylaxis indicated  [place] Catheter: Not present  Central Lines: None  Code Status: No CPR- Do NOT Intubate      Risk Factors Present on Admission              # Thrombocytopenia: Plts = 59 10e3/uL (Ref range: 150 - 450 10e3/uL) on admission, will monitor for bleeding      Disposition Plan   Expected discharge: 2 days  Recommended to prior living arrangement once mental status at baseline.     The patient's care was discussed with the Patient.    Santana Holt MD  [facility]  Securely message with the Vocera Web Console (learn more here)  Text page via [employer] Paging/Directory      ______________________________________________________________________    Chief Complaint    Confusion, constipation    History is obtained from the patient, electronic health record and emergency department physician    History of Present Illness   Ramirez Leslie is a 76 year old male who was evaluated at [facility] emergency department for increased confusion and constipation.  Past medical history significant for alcoholic liver cirrhosis, status post TIPS May 5, 2021, pancytopenia, paroxysmal atrial fibrillation, type 2 diabetes, essential hypertension, prostate cancer, kidney stones, CHF, subdural hematoma 2017.  Patient lives at home with family who noted patient was off like when his ammonia level is elevated.  He was last hospitalized from June 30 through July 5, 2021 for hepatic encephalopathy.  He has been taking medications as prescribed but family noted no bowel movement during the day prior to ED evaluation.  Patient was noted with some abdominal discomfort and imaging was consistent with constipation.  Ammonia level was elevated 89 but it was up to 150 during last hospitalization.  As per ED provider there was no stool in the rectal vault but further exam was limited due to patient uncomfortable from hemorrhoids.  Patient was treated with an enema and stool softeners and had a bowel movement prior to transfer.  He was transferred to [facility] due to bed availability.  Upon arrival, he is awake, alert, cooperative and in no acute distress.    Review of Systems    The 10 point Review of Systems is negative other than noted in the HPI or here.     Past Medical History    I have reviewed this patient's medical history and updated it with pertinent information if needed.   Past Medical History:   Diagnosis Date     Cirrhosis (H)      HTN (hypertension)      Nephrolithiasis      Subdural hematoma (H)      Type II diabetes mellitus (H)     insulin dependent       Past Surgical History   I have reviewed this patient's surgical history and updated it with pertinent information if  needed.  Past Surgical History:   Procedure Laterality Date     APPENDECTOMY       HC LAP,ORCHIECTOMY Left      LITHOTRIPSY         Social History   I have reviewed this patient's social history and updated it with pertinent information if needed.  Social History     Tobacco Use     Smoking status: Former Smoker     Packs/day: 2.00     Years: 40.00     Pack years: 80.00     Start date: 1957     Quit date: 2007     Years since quittin.1     Smokeless tobacco: Never Used   Substance Use Topics     Alcohol use: Not on file     Drug use: Not on file       Family History   I have reviewed this patient's family history and updated it with pertinent information if needed.  Family History   Problem Relation Age of Onset     Heart Disease Father        Prior to Admission Medications   Prior to Admission Medications   Prescriptions Last Dose Informant Patient Reported? Taking?   XIFAXAN 550 MG TABS tablet   Yes No   Sig: Take 550 mg by mouth 2 times daily   atorvastatin (LIPITOR) 20 MG tablet   Yes No   Sig: Take 20 mg by mouth every evening    carvedilol (COREG) 3.125 MG tablet  Spouse/Significant Other Yes No   Sig: Take 3.125 mg by mouth 2 times daily (with meals)   dicyclomine (BENTYL) 20 MG tablet   Yes No   Sig: Take 20 mg by mouth 2 times daily   ferrous sulfate (FE TABS) 325 (65 Fe) MG EC tablet   Yes No   Sig: Take 325 mg by mouth 2 times daily   furosemide (LASIX) 20 MG tablet   Yes No   Sig: Take 20 mg by mouth daily   insulin glargine (LANTUS PEN) 100 UNIT/ML pen   Yes No   Sig: Inject 16 Units Subcutaneous every morning    lactulose (CEPHULAC) 10 GM packet   No No   Sig: Take 2 packets (20 g) by mouth 3 times daily   lactulose 20 GM/30ML SOLN   No No   Sig: Take 30 mLs (20 g) by mouth 3 times daily   metFORMIN (GLUCOPHAGE) 500 MG tablet   Yes No   Sig: Take 500 mg by mouth 2 times daily (with meals)   omeprazole (PRILOSEC) 20 MG DR capsule   Yes No   Sig: Take 20 mg by mouth 2 times daily    senna-docusate (SENOKOT-S/PERICOLACE) 8.6-50 MG tablet   No No   Sig: Take 1 tablet by mouth daily as needed for constipation   spironolactone (ALDACTONE) 100 MG tablet   Yes No   Sig: Take 100 mg by mouth daily      Facility-Administered Medications: None     Allergies   No Known Allergies    Physical Exam   Vital Signs: Temp: 97.8  F (36.6  C) Temp src: Oral BP: 134/60 Pulse: 79   Resp: 20 SpO2: 97 % O2 Device: None (Room air)    Weight: 175 lbs 6.4 oz    General: In no acute distress  HEENT: Normocephalic, atraumatic  Neck: No JVD  Heart: Irregular  Lungs: Clear to auscultation  Abdomen: Soft, nontender, nondistended, no ascitic wave  Extremities: 1+ pitting edema, nontender  Skin: Warm, dry  Neuro: Awake, alert, oriented x3, able to name the president  Psychiatric: Pleasant and cooperative, normal affect    Data   Data reviewed today: I reviewed all medications, new labs and imaging results over the last 24 hours. I personally reviewed no images or EKG's today.    Recent Labs   Lab 07/17/21 2143 07/17/21 2051   WBC 3.4*  --    HGB 10.2*  --    MCV 94  --    PLT 59*  --    NA  --  142   POTASSIUM  --  4.1   CHLORIDE  --  113*   CO2  --  21   BUN  --  19   CR  --  1.14   ANIONGAP  --  8   ADIS  --  9.0   GLC  --  120*   ALBUMIN  --  2.8*   PROTTOTAL  --  6.7*   BILITOTAL  --  1.7*   ALKPHOS  --  116   ALT  --  29   AST  --  42     Recent Results (from the past 24 hour(s))   Abdomen XR, 2 vw, flat and upright    Narrative    EXAM: XR ABDOMEN 2VIEWS  LOCATION: [facility]  DATE/TIME: 7/17/2021 10:14 PM    INDICATION: Abdominal discomfort. Concern for constipation.  COMPARISON: None.      Impression    IMPRESSION: Nonobstructive bowel gas pattern. Moderate volume of colorectal stool. Small calcifications which project over the expected positions of both kidneys  consistent with intrarenal stones. TIPS in position.

## 2021-07-18 NOTE — PLAN OF CARE
Pt alert and oriented x4 but forgetful, normal breathing pattern and denies shortness of breath. Pt stated he had a bowel movement before leaving the ED at [facility]. Pt educated on her to use the call light and he verbalized understanding.

## 2021-07-18 NOTE — ED PROVIDER NOTES
History   Chief Complaint:  Abnormal Labs       The history is provided by the patient and the spouse.      Ramirez Leslie is a 76 year old male with history alcoholic cirrhosis, type II diabetes, hypertension, atrial fibrillation, and subdural hematoma who presents with abnormal labs. The patient says that for around the last week he has not felt good. He also complains of lower abdominal pain and urinary frequency. The patients family states that he has elevated ammonia levels from a lab drawn 3 days ago. He is currently on Lactulose for his cirrhosis and his wife says his last drink was over a month ago. She also provides that he has not had a bowel movement today when he should be having around 3 a day. Additionally, he seems to have been more confused today.    Review of Systems   Constitutional: Negative for chills and fever.   Respiratory: Negative for cough and shortness of breath.    Cardiovascular: Negative for chest pain.   Gastrointestinal: Positive for abdominal pain (lower), blood in stool and constipation. Negative for nausea and vomiting.   Genitourinary: Positive for frequency.   Psychiatric/Behavioral: Positive for confusion.   All other systems reviewed and are negative.      Allergies:  The patient has no known allergies.    Medications:  Lipitor  Coreg  Bentyl  Lasix  Insulin  Cephulac  Lactulose  Glucophage  Prilosec  Senokot-S  Aldactone  Xifaxan    Past Medical History:    Alcoholic cirrhosis with ascites  Hypertension  Nephrolithiasis  Subdural hematoma  Type II diabetes  Hepatic encephalopathy  Altered mental status  Atrial fibrillation  Pancytopenia  Recurrent right pleural effusion  SBP  Hypoxemic respiratory failure   Thrombocytopenia  Prostate cancer  Kidney stone  Hepatitis A    Past Surgical History:    Appendectomy  Laparoscopic orchiectomy  Lithotripsy   Right meniscus repair  Prostatectomy  Cystoscopy    Family History:    Heart disease    Social History:  Patient came from  home.  Patient is accompanied in the ED.  Patient denies recent alcohol use.    Physical Exam     Patient Vitals for the past 24 hrs:   BP Temp Temp src Pulse Resp SpO2   07/17/21 2210 -- -- -- -- -- 99 %   07/17/21 2200 123/60 -- -- 68 -- 99 %   07/17/21 2130 126/81 -- -- 67 -- 99 %   07/17/21 2100 134/68 -- -- 66 -- 99 %   07/17/21 2032 (!) 141/82 97.8  F (36.6  C) Oral 70 20 98 %   07/17/21 1951 139/70 98.1  F (36.7  C) Oral 72 20 97 %       Physical Exam  General: Alert, No obvious discomfort, well kept  Eyes: PERRL, conjunctivae pink, mild scleral icterus or conjunctival injection  ENT:   Moist mucus membranes, posterior oropharynx clear without erythema or exudates, No lymphadenopathy, Normal voice  Resp:  Lungs clear to auscultation bilaterally, no crackles/rubs/wheezes. Good air movement  CV:  Normal rate and rhythm, no murmurs/rubs/gallops  GI:  Abdomen soft and non-distended.  Normoactive BS.   Lower abd tenderness, No guarding or rebound, No masses, rectal: No palpable stool in vault.  Tender hemorrhoids that are nonbleeding.  Skin:  Warm, dry.  No rashes or petechiae, mild jaundice, large bruise to medial distal thigh  Musculoskeletal: No peripheral edema or calf tenderness, Normal gross ROM   Neuro: Alert, appears to be at baseline mental status currently per wife.  Normal sensation  Psychiatric: Normal affect, cooperative, good eye contact    Emergency Department Course     Imaging:    Abdomen XR, 2 vw, flat and upright  Nonobstructive bowel gas pattern. Moderate volume of colorectal stool. Small calcifications which project over the expected positions of both kidneys  consistent with intrarenal stones. TIPS in position.  Reading per radiology    Laboratory:    CBC: WBC 3.4 (L), HGB 10.2 (L), PLT 59 (L)     CMP: Chloride 113 (H), Glucose 120 (H), Protein Total 6.7 (L), Albumin 2.8 (L), Bilirubin 1.7 (H), o/w WNL (Creatinine 1.14)    Lactic acid (result time 2112) 2.1 (H)     Lactic acid (result time  2341) 2.1 (H)     Ammonia: 89 (H)    Alcohol level blood: <0.01    Magnesium: 1.7    UA with microscopic: Blood Trace (A), Bacteria Few (A), Mucous Present (A), RBC 3 (H) o/w WNL      Asymptomatic SARS-CoV-2 COVID-19 Virus by PCR: pending    Emergency Department Course:    Reviewed:  I reviewed nursing notes, vitals, past medical history and care everywhere    Assessments:  2045 I obtained history and examined the patient as noted above.   2126 I rechecked the patient.   0024 I rechecked the patient and explained findings.    Consults:   0120 I consulted with Dr. Madrid of the hospitalist service for [facility] and discussed patient admission. They accepted care of the patient.    Interventions:  2108 NS 1000 mL IV  2332 Pink lady enema 286 mL Rectal  0038 NS 1000 mL IV  0047 Xylocaine 6 mL Urethral    Disposition:  The patient was transferred to [facility] via EMS. Dr. Holt accepted the patient for transfer.     Impression & Plan     Medical Decision Making:  Ramirez Leslie is a 76 year old male who presents today for evaluation of elevated ammonia level and some increasing confusion as well as lack of bowel movement. Patient's wife states that his primary doctor advised him to come to the emergency department for evaluation ration if he has not had a bowel movement. She also noted him to be slightly more confused today doing such things as putting in the water in the grinds instead of the reservoir when making coffee. My evaluation here showed some lower abdominal tenderness therefore x-ray was obtained which does show constipation. We attempted an enema without significant relief. There was not stool palpable in the vault perform manual disimpaction. Patient also has tender hemorrhoids and tolerating very little examination. Patient has had no significant confusion here. His laboratory studies show a slightly elevated lactic acid at 2.1. I suspect this is due to dehydration and not infection. He does  "not have any URI type symptoms consistent with upper respiratory infection. He has white cell count is 3.4. He does not have a urinary infection. The remainder of his laboratory studies are noncontributory. His ammonia is 89. His daughter reports that his ammonia is usually around \"80 something.\" Given his history and mild increasing confusion plan will be to admit patient to [facility] as that is where bed is available for continued monitoring evaluation and treatment for constipation. I spoke to Dr. Holt who does accept patient to his service. Patient will be transferred via EMS.      Covid-19  Ramirez Leslie was evaluated during a global COVID-19 pandemic, which necessitated consideration that the patient might be at risk for infection with the SARS-CoV-2 virus that causes COVID-19.   Applicable protocols for evaluation were followed during the patient's care.   COVID-19 was considered as part of the patient's evaluation. The plan for testing is:  a test was obtained during this visit.    Diagnosis:    ICD-10-CM    1. Constipation  K59.00    2. Hepatic encephalopathy (H)  K72.90    3. Dehydration  E86.0        Discharge Medications:  New Prescriptions    No medications on file       Scribe Disclosure:  I, Jarvis Mcgarry, am serving as a scribe at 8:35 PM on 7/17/2021 to document services personally performed by Jun Cisneros APRN based on my observations and the provider's statements to me.          Jun Cisneros APRN CNP  07/18/21 0120    "

## 2021-07-18 NOTE — ED NOTES
Wife is called for verbal consent to transfer pt to [facility]. Wife consents, transfer sheet signed.

## 2021-07-19 NOTE — DISCHARGE SUMMARY
[facility] MEDICINE  DISCHARGE SUMMARY     Primary Care Physician: Richi Ojeda  Admission Date: 7/18/2021   Discharge Provider: Linda Abbott MD Discharge Date: 7/19/2021   Diet:   Active Diet and Nourishment Order   Procedures     Combination Diet Consistent Carb 75 Grams CHO per Meal Diet; 2 gm NA Diet     Diet       Code Status: No CPR- Do NOT Intubate   Activity: DCACTIVITY: Activity as tolerated        Condition at Discharge: Stable     REASON FOR PRESENTATION(See Admission Note for Details)       PRINCIPAL & ACTIVE DISCHARGE DIAGNOSES     Active Problems:    Alcoholic cirrhosis of liver with ascites (H)    Pancytopenia (H)    Hepatic encephalopathy (H)    Congestive heart failure (H)    Type 2 diabetes mellitus with complication (H)      PENDING LABS     Unresulted Labs Ordered in the Past 30 Days of this Admission     No orders found for last 31 day(s).            PROCEDURES ( this hospitalization only)          RECOMMENDATIONS TO OUTPATIENT PROVIDER FOR F/U VISIT     Follow-up Appointments     Follow-up and recommended labs and tests       Follow up with primary care provider, RICHI OJEDA, within 7 days for   hospital follow- up.  The following labs/tests are recommended: ammonia   level, CMP.                 DISPOSITION     Home with home care    SUMMARY OF HOSPITAL COURSE:      76-year-old patient who presented to [facility] with increasing confusion and constipation, he has history of liver cirrhosis status post TIPS on May 2021, pancytopenia, paroxysmal A. fib, type 2 diabetes, prostate cancer, CHF, lives with family, they noted that abdominal discomfort and more confused continue that he has elevated ammonia level, in the ER he was found to have elevated ammonia, and he received an enema on stool softener and that he had a bowel movement, he was admitted for further monitoring    Hepatic encephalopathy  Secondary to hyperammonemia symptoms  has improved after multiple bowel movements ammonia level came down to 59 appears to be normal mentation  Resume lactulose and rifaximin    Alcoholic cirrhosis  Status post TIPS May 2021, has been abstinent for 1 month,  No evidence of ascites,  Resume the furosemide spironolactone, patient daughter is concerned about how advanced disease patient has appointment with hepatologist tomorrow and I encouraged her to discuss with hematologist,    History of heart failure with preserved ejection fraction  Appears to be euvolemic, resume his diuretics    Paroxysmal A. Fib  Rate controlled on Coreg      Pancytopenia  Thrombocytopenia and anemia appears to be at baseline        Discharge Medications with Med changes:     Current Discharge Medication List      CONTINUE these medications which have NOT CHANGED    Details   atorvastatin (LIPITOR) 20 MG tablet Take 20 mg by mouth every evening       carvedilol (COREG) 3.125 MG tablet Take 3.125 mg by mouth 2 times daily (with meals)      dicyclomine (BENTYL) 20 MG tablet Take 20 mg by mouth 2 times daily      ferrous sulfate (FE TABS) 325 (65 Fe) MG EC tablet Take 325 mg by mouth 2 times daily      furosemide (LASIX) 40 MG tablet Take 40 mg by mouth daily      insulin glargine (LANTUS PEN) 100 UNIT/ML pen Inject 16 Units Subcutaneous every morning     Comments: If Lantus is not covered by insurance, may substitute Basaglar at same dose and frequency.        lactulose 20 GM/30ML SOLN Take 30 mLs (20 g) by mouth 3 times daily  Qty: 946 mL, Refills: 0    Associated Diagnoses: Hepatic encephalopathy (H)      metFORMIN (GLUCOPHAGE) 500 MG tablet Take 500 mg by mouth 2 times daily (with meals)      omeprazole (PRILOSEC) 20 MG DR capsule Take 20 mg by mouth 2 times daily      spironolactone (ALDACTONE) 100 MG tablet Take 100 mg by mouth daily      XIFAXAN 550 MG TABS tablet Take 550 mg by mouth 2 times daily      senna-docusate (SENOKOT-S/PERICOLACE) 8.6-50 MG tablet Take 1 tablet by  mouth daily as needed for constipation  Qty: 30 tablet, Refills: 0    Associated Diagnoses: Constipation, unspecified constipation type                   Rationale for medication changes:    Holding Bentyl as may cause constipation, discussed with the daughter to follow-up with the pathologist          Consults     CARE MANAGEMENT / SOCIAL WORK IP CONSULT  PHYSICAL THERAPY ADULT IP CONSULT  OCCUPATIONAL THERAPY ADULT IP CONSULT    Immunizations given this encounter       There is no immunization history on file for this patient.              SIGNIFICANT IMAGING FINDINGS     No results found for this visit on 07/18/21.    SIGNIFICANT LABORATORY FINDINGS     Most Recent 3 CBC's:Recent Labs   Lab Test 07/18/21  0655 07/17/21  2143 07/06/21  0644   WBC 3.1* 3.4* 3.1*   HGB 9.3* 10.2* 10.1*   MCV 93 94 94   PLT 40* 59* 48*     Most Recent 3 BMP's:Recent Labs   Lab Test 07/19/21  1141 07/19/21  0831 07/19/21  0649 07/18/21  0655 07/17/21 2051 07/17/21 2051   NA  --   --  140 143  --  142   POTASSIUM  --   --  4.0 3.8  --  4.1   CHLORIDE  --   --  114* 117*  --  113*   CO2  --   --  19* 19*  --  21   BUN  --   --  18 16  --  19   CR  --   --  0.90 0.97  --  1.14   ANIONGAP  --   --  7 7  --  8   ADIS  --   --  7.9* 7.9*  --  9.0   * 91 95 87   < > 120*    < > = values in this interval not displayed.     Most Recent 2 LFT's:Recent Labs   Lab Test 07/19/21  0649 07/18/21  0655   AST 25 26   ALT 18 19   ALKPHOS 95 101   BILITOTAL 2.2* 2.2*         Discharge Orders        Home care nursing referral      Home Care OT Referral for Hospital Discharge      Home Care Social Service Referral for Hospital Discharge      Palliative Care Referral      Reason for your hospital stay    Change in mental status     Follow-up and recommended labs and tests     Follow up with primary care provider, TORSTEN OJEDA, within 7 days for hospital follow- up.  The following labs/tests are recommended: ammonia level, CMP.     Activity     Your activity upon discharge: activity as tolerated     MD face to face encounter    Documentation of Face to Face and Certification for Home Health Services    I certify that patient: Ramirez Leslie is under my care and that I, or a nurse practitioner or physician's assistant working with me, had a face-to-face encounter that meets the physician face-to-face encounter requirements with this patient on: 7/19/2021.    This encounter with the patient was in whole, or in part, for the following medical condition, which is the primary reason for home health care: .    I certify that, based on my findings, the following services are medically necessary home health services: Nursing, Occupational Therapy, and Social Work.    My clinical findings support the need for the above services because: Nurse is needed: To assess volume status and mentation after changes in medications or other medical regimen..    Further, I certify that my clinical findings support that this patient is homebound (i.e. absences from home require considerable and taxing effort and are for medical reasons or [employer] services or infrequently or of short duration when for other reasons) because: Leaving home is medically contraindicated for the following reason(s): Dyspnea on exertion that makes it so they cannot leave their home for needed services without clinical deterioration...    Based on the above findings. I certify that this patient is confined to the home and needs intermittent skilled nursing care, physical therapy and/or speech therapy.  The patient is under my care, and I have initiated the establishment of the plan of care.  This patient will be followed by a physician who will periodically review the plan of care.  Physician/Provider to provide follow up care: Richi Kay    Attending hospital physician (the Medicare certified [place] provider): Linda Abbott MD  Physician Signature: See electronic signature associated with  these discharge orders.  Date: 7/19/2021     Diet    Follow this diet upon discharge: Orders Placed This Encounter      Combination Diet Consistent Carb 75 Grams CHO per Meal Diet; 2 gm NA Diet       Examination   Physical Exam   Temp:  [97.7  F (36.5  C)-98.3  F (36.8  C)] 97.7  F (36.5  C)  Pulse:  [71-86] 71  Resp:  [16-18] 18  BP: (103-141)/(53-71) 125/71  SpO2:  [96 %-98 %] 97 %  Wt Readings from Last 1 Encounters:   07/18/21 79.6 kg (175 lb 6.4 oz)       Constitutional: awake, alert, cooperative, no apparent distress, and appears stated age  Respiratory: No increased work of breathing, good air exchange, clear to auscultation bilaterally, no crackles or wheezing  Cardiovascular: normal apical pulses , normal S1 and S2 and no edema  GI: No scars, normal bowel sounds, soft, non-distended, non-tender, no masses palpated, no hepatosplenomegally      Please see EMR for more detailed significant labs, imaging, consultant notes etc.    ILinda MD, personally saw the patient today and spent greater than 30 minutes discharging this patient.    Linda Abbott MD  [facility]    CC:Richi Kay

## 2021-07-19 NOTE — PLAN OF CARE
Occupational Therapy Discharge Summary    Reason for therapy discharge:    Discharged to home.    Progress towards therapy goal(s). See goals on Care Plan in [facility] electronic health record for goal details.  Goals met    Therapy recommendation(s):    Recommend home with supervision.

## 2021-07-19 NOTE — PLAN OF CARE
Problem: Adult Inpatient Plan of Care  Goal: Optimal Comfort and Wellbeing  Outcome: Improving   Patient denied pain. Had loose BM x 4 during evening shift.   Patient impulsive. Bed in lowest position. Bed alarm in place.

## 2021-07-19 NOTE — PLAN OF CARE
Confusion has resolved. Stools x 2 so far this shift. Up independently since therapy okay'd   Hopes to discharge home later today. Eating and drinking well. Oriented x 4, forgetful at times. Written information left for family on liver failure as requested. They will not be coming before discharge due to distance. Jessica Hartley RN

## 2021-07-19 NOTE — PLAN OF CARE
Discharged to home with home care. Daughter and wife both involved with discharge plan of care. All belongings sent (clothing) additional AVS and information on liver failure given to daughter.  Jessica Hartley RN    Problem: Adult Inpatient Plan of Care  Goal: Plan of Care Review  Outcome: Adequate for Discharge  Flowsheets (Taken 7/19/2021 0227)  Plan of Care Reviewed With:   patient   spouse   daughter  Outcome Summary: plan reviewed with wife and daughter, wife will be making appointmen for labs and follow-up within a week. daughter will assist with paliative care insurance verification and appointment. home care will call to setup appt.

## 2021-07-19 NOTE — PROGRESS NOTES
Physical Therapy: Orders received. Chart reviewed and discussed with care team.? Physical Therapy not indicated due to occupation therapy working on functional mobility and cognition.? Defer discharge recommendations to occupational therapy.? Will complete orders.     Kenna [place] DPT   7/19/2021

## 2021-07-19 NOTE — PROGRESS NOTES
"Care Management Follow Up    Length of Stay (days): 1    Expected Discharge Date: 07/20/2021     Concerns to be Addressed:       Patient plan of care discussed at interdisciplinary rounds: Yes    Anticipated Discharge Disposition:  Home- with services.      Anticipated Discharge Services:  TBD  Anticipated Discharge DME:      Patient/family educated on Medicare website which has current facility and service quality ratings:    Education Provided on the Discharge Plan:  GAUDENCIO discussed with daughter Korin what palliative, hospice and home care options are, and what each do.    Patient/Family in Agreement with the Plan:      Referrals Placed by CM/SW:  Home care with [facility] Home care RN, SW, OT.  Private pay costs discussed: Not applicable    Additional Information:  GAUDENCIO received a call from pt bedside RN that pt daughter Korin had questions regarding discharge and what services are available for pt. Pt daughter stated pt wants to be done with going to the hospital. Writer provided Korin with supportive listening regarding her concerns about her father and his wish to not have more hospital admissions. Korin asked what palliative, hospice and home care are and what services they provide. GAUDENCIO informed daughter that pt was having a PT and OT evaluation this morning and writer would follow recommendations for home care. Pt daughter wants assistance in the home for when pt may need to be seen by MD, she reports he refuses to go to the doctor or hospital when he becomes \"cloudy\".  GAUDENCIO spoke to MD regarding pt family concerns. GAUDENCIO will reach out to palliative for assistance and message MD with update.      Candi Costa MSW     GAUDENCIO spoke to Palliative, this is an appropriate referral. MD is planning on discharging him today, referral to be outpatient. Following OT recommendations, writer made referrals for OT, RN, and GAUDENCIO for assistance with resources per family request.  Writer spoke with daughter. She will be transporting pt " this afternoon between around 315 pm. Daughter and bedside RN updated. Orders faxed to home care.

## 2021-07-25 NOTE — DISCHARGE SUMMARY
[facility]    Discharge Summary  Hospitalist    Date of Admission:  6/30/2021  Date of Discharge:  7/6/2021 11:50 AM  Discharging Provider: Veto Mcclain MD  Date of Service (when I saw the patient): 7/6/2021    Discharge Diagnoses      1.  Hepatic encephalopathy     2.  History of end-stage liver disease, Prior TIPS 5/5/2021     4.  Atrial fibrillation, currently NSR, not on chronic anticoagulation with history of thrombocytopenia     5.  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  Hypertension stable    7.  Insulin requiring diabetes mellitus, controlled with last A1c back in May at 5.6    History of Present Illness   Ramirez Leslie is an 76 year old male who presented with confusion. Please see hospital course for details.    Hospital Course   Summary of Stay: Ramirez Leslie is a 76 year old male history of end-stage liver disease, alcohol liver cirrhosis, prior ascites, previous hepatic hydrothorax, previous hospitalizations for hepatic encephalopathy, prostate cancer, nephrolithiasis, SDH in 2017, insulin requiring diabetes mellitus, prior TI PS, who lives at home with the family and presented here by emergency personnel due to alteration in mental state with confusion, disorientation, noted by family at home that has been worsening at least in the past 3 days duration.     1.  Hepatic encephalopathy  -Mental status improved.  States that he had bowel movement.  Continued on lactulose and rifaximin.  -Ammonia level improved     2.  History of end-stage liver disease, Prior TIPS 5/5/2021  -Continue lactulose and rifaximin as above for hepatic encephalopathy.    -Continued Lasix 20 mg p.o. daily, spironolactone 100 mg p.o. daily.       4.  Atrial fibrillation, currently NSR, not on chronic anticoagulation with history of thrombocytopenia  -Continued Coreg 3.125 mg p.o. twice daily     5.  Stable pancytopenia secondary to alcohol liver  cirrhosis  -monitored cbc     6.  Hypertension stable  -Continued Coreg. Also on spironolactone and Lasix     7.  Insulin requiring diabetes mellitus, controlled with last A1c back in May at 5.6  -We will monitor.  We will continue Lantus insulin 6 units every morning, insulin sliding scale.       Significant Results and Procedures   Results for orders placed or performed during the hospital encounter of 06/30/21   CT Head w/o Contrast    Narrative    CT SCAN OF THE HEAD WITHOUT CONTRAST   6/30/2021 10:44 AM     HISTORY: Mental status change, unknown cause.    TECHNIQUE:  Axial images of the head and coronal reformations without  IV contrast material. Radiation dose for this scan was reduced using  automated exposure control, adjustment of the mA and/or kV according  to patient size, or iterative reconstruction technique.    COMPARISON: 5/17/2021.    FINDINGS: There is no evidence of intracranial hemorrhage, mass, acute  infarct or anomaly. The ventricles are normal in size, shape and  configuration. Mild diffuse parenchymal volume loss. Mild-to-moderate  scattered patchy cerebral white matter hypodensities which are  nonspecific, but likely related to chronic microvascular ischemic  disease. Scattered intracranial atherosclerotic calcifications.    Mild scattered mucosal thickening in the visualized aspects of the  paranasal sinuses. The mastoid and middle ear cavities are clear. The  bony calvarium and bones of the skull base appear intact. Partially  visualized degenerative change at the median atlantoaxial joint.      Impression    IMPRESSION:     1. No evidence of acute intracranial hemorrhage, mass, or herniation.  2. Diffuse parenchymal volume loss and white matter changes likely due  to chronic microvascular ischemic disease.    YESI WEINBERG MD          SYSTEM ID:  [id number]         Pending Results   Code Status   Full Code       Primary Care Physician   TORSTEN OJEDA        Discharge Disposition    Discharged to home  Condition at discharge: Stable    Consultations This Hospital Stay   CARE MANAGEMENT / SOCIAL WORK IP CONSULT    Time Spent on this Encounter   I, Veto Mcclain MD, MD, personally saw the patient today and spent less than or equal to 30 minutes discharging this patient.    Discharge Orders      Medication Therapy Management Referral      Reason for your hospital stay    Hepatic encephalopathy     Follow-up and recommended labs and tests     Follow up with primary care provider, TORSTEN OEJDA, within 7 days     Activity    Your activity upon discharge: activity as tolerated     Diet    Follow this diet upon discharge: Orders Placed This Encounter      Combination Diet Regular Diet Adult     Discharge Medications   Discharge Medication List as of 7/6/2021 11:26 AM      START taking these medications    Details   lactulose 20 GM/30ML SOLN Take 30 mLs (20 g) by mouth 3 times daily, Disp-946 mL, R-0, E-Prescribe      senna-docusate (SENOKOT-S/PERICOLACE) 8.6-50 MG tablet Take 1 tablet by mouth daily as needed for constipation, Disp-30 tablet, R-0, E-Prescribe         CONTINUE these medications which have CHANGED    Details   lactulose (CEPHULAC) 10 GM packet Take 2 packets (20 g) by mouth 3 times daily, Disp-60 packet, R-3, E-Prescribe         CONTINUE these medications which have NOT CHANGED    Details   atorvastatin (LIPITOR) 20 MG tablet Take 20 mg by mouth every evening , Historical      carvedilol (COREG) 3.125 MG tablet Take 3.125 mg by mouth 2 times daily (with meals), Historical      ferrous sulfate (FE TABS) 325 (65 Fe) MG EC tablet Take 325 mg by mouth 2 times daily, Historical      insulin glargine (LANTUS PEN) 100 UNIT/ML pen Inject 16 Units Subcutaneous every morning , HistoricalIf Lantus is not covered by insurance, may substitute Basaglar at same dose and frequency.        metFORMIN (GLUCOPHAGE) 500 MG tablet Take 500 mg by mouth 2 times daily (with meals), Historical       omeprazole (PRILOSEC) 20 MG DR capsule Take 20 mg by mouth 2 times daily, Historical      spironolactone (ALDACTONE) 100 MG tablet Take 100 mg by mouth daily, Historical      XIFAXAN 550 MG TABS tablet Take 550 mg by mouth 2 times daily, DR, Historical      dicyclomine (BENTYL) 20 MG tablet Take 20 mg by mouth 2 times daily, Historical      furosemide (LASIX) 20 MG tablet Take 20 mg by mouth daily, Historical             Allergies   No Known Allergies  Data   Most Recent 3 CBC's:Recent Labs   Lab Test 07/18/21  0655 07/17/21  2143 07/06/21  0644   WBC 3.1* 3.4* 3.1*   HGB 9.3* 10.2* 10.1*   MCV 93 94 94   PLT 40* 59* 48*      Most Recent 3 BMP's:  Recent Labs   Lab Test 07/19/21  1141 07/19/21  0831 07/19/21  0649 07/18/21  0655 07/17/21 2051 07/17/21 2051   NA  --   --  140 143  --  142   POTASSIUM  --   --  4.0 3.8  --  4.1   CHLORIDE  --   --  114* 117*  --  113*   CO2  --   --  19* 19*  --  21   BUN  --   --  18 16  --  19   CR  --   --  0.90 0.97  --  1.14   ANIONGAP  --   --  7 7  --  8   ADIS  --   --  7.9* 7.9*  --  9.0   * 91 95 87   < > 120*    < > = values in this interval not displayed.     Most Recent 2 LFT's:  Recent Labs   Lab Test 07/19/21  0649 07/18/21  0655   AST 25 26   ALT 18 19   ALKPHOS 95 101   BILITOTAL 2.2* 2.2*     Most Recent INR's and Anticoagulation Dosing History:  Anticoagulation Dose History     Recent Dosing and Labs Latest Ref Rng & Units 10/30/2020 1/5/2021 2/9/2021 4/21/2021 4/22/2021 5/17/2021 6/30/2021    INR 0.86 - 1.14 1.33(H) 1.47(H) 1.49(H) 1.53(H) 1.83(H) 1.49(H) 1.46(H)        Most Recent 3 Troponin's:  Recent Labs   Lab Test 06/30/21  1005 05/17/21  0837 04/21/21  0601   TROPI <0.015 <0.015 <0.015     Most Recent Cholesterol Panel:No lab results found.  Most Recent 6 Bacteria Isolates From Any Culture (See EPIC Reports for Culture Details):  Recent Labs   Lab Test 04/21/21  0756 04/21/21  0755 08/31/20  1150 08/29/20  2343 08/29/20  2309   CULT No growth No  growth No growth <10,000 colonies/mL  mixed urogenital rehana   No growth     Most Recent TSH, T4 and A1c Labs:  Recent Labs   Lab Test 05/17/21  0837 04/21/21  5556   TSH 3.18  --    A1C  --  5.6

## 2024-05-28 NOTE — DISCHARGE SUMMARY
[facility]  Hospitalist Discharge Summary      Date of Admission:  8/29/2020  Date of Discharge:  9/1/2020  Discharging Provider: Shankar Fair MD      Discharge Diagnoses     #1.  Acute hypoxic respiratory failure  #2.  Large right-sided pleural effusion  #3.  Liver cirrhosis with anasarca  #4.  Pancytopenia secondary to liver disease  #5.  Spontaneous bacterial peritonitis    Follow-ups Needed After Discharge   Follow-up Appointments     Follow-up and recommended labs and tests       Follow up with primary care provider, Physician No Ref-Primary, within 7   days to evaluate medication change, to evaluate treatment change and for   hospital follow- up.  The following labs/tests are recommended: BMP.             Unresulted Labs Ordered in the Past 30 Days of this Admission     Date and Time Order Name Status Description    8/30/2020 0031 Cytology non gyn In process     8/30/2020 0031 Fluid Culture Aerobic Bacterial Preliminary     8/29/2020 2307 Fluid Culture Aerobic Bacterial Preliminary       These results will be followed up by primary care physician    Discharge Disposition   Discharged to home  Condition at discharge: Stable      Hospital Course   Ramirez Leslie is a 75 year old male with PMH including cirrhosis, HTN, nephrolithiasis, IDDM type II, and subdural hematoma who presents with abdominal pain, diarrhea, shortness of breath.   Patient has anasarca with evidence of large right pleural effusion with shortness of breath and hypoxia, ascites, bilateral lower extremity edema and generalized weakness.       Patient was admitted and diagnostic  fluid analysis ascites showed evidence of spontaneous bacterial peritonitis with polymorphonuclear cells of over 700.  Patient was started on intravenous ceftriaxone.  He was treated with oxygen and closely monitored in the hospital.  Gastroenterology team was consulted    Patient was hypoxic respiratory failure due to hydrothorax related to liver  disease was addressed with thoracentesis with drainage of 2.1 L of fluid and oxygen supplement.  Patient condition improved but he continues to desaturate and required prescription home medications until he sees his primary care physician in the next 1 week for further evaluation need for oxygen.  Patient may need further evaluation with echocardiogram and cardiac work-up if he continues to desaturate despite thoracentesis.    Patient had spontaneous bacterial peritonitis for which she was treated with intravenous ceftriaxone in consultation with gastroenterology team.  Patient beta-blocker was discontinued due to studies indicating it increased mortality in patients with spontaneous bacterial peritonitis.  Antibiotic was changed to oral Cipro floxacillin to be continued for the next 5 days followed by prophylactic dose after seen by primary care physician.    Patient has ascites was minimal and significant drainage was not done except for diagnostic tap which was further evaluated with culture and negative to date.  Patient was started on Lasix and Aldactone to be titrated as needed.  Was advised to avoid alcohol, low-salt diet.  Weight monitoring.    Patient also has pancytopenia which remains stable and no evidence of bleeding during hospitalization.    His Lantus insulin was on hold due to his controlled blood glucose.  This was initiated at lower dose to be titrated as needed.    Overall, patient condition improved and SARS-CoV-2 PCR was negative indicating no evidence of: And infection.  Patient was alert and oriented x3 without evidence of hepatic encephalopathy.  He understood the plan of care and was discharged in stable condition to follow-up with his primary care physician in the next 7 days.          Consultations This Hospital Stay   GASTROENTEROLOGY IP CONSULT  CARE COORDINATOR IP CONSULT    Code Status   Full Code    Time Spent on this Encounter   IShankar MD, personally saw the patient  today and spent greater than 30 minutes discharging this patient.       Shankar Fair MD  [facility]  ______________________________________________________________________    Physical Exam   Vital Signs: Temp: 99.3  F (37.4  C) Temp src: Temporal BP: (!) 148/59 Pulse: 73   Resp: 18 SpO2: 93 % O2 Device: Oxymask Oxygen Delivery: 4 LPM  Weight: 193 lbs 11.2 oz       Primary Care Physician   Physician No Ref-Primary    Discharge Orders      Reason for your hospital stay    Liver cirrhosis , hypoxia     Follow-up and recommended labs and tests     Follow up with primary care provider, Physician No Ref-Primary, within 7 days to evaluate medication change, to evaluate treatment change and for hospital follow- up.  The following labs/tests are recommended: BMP.     Activity    Your activity upon discharge: activity as tolerated     Full Code     Oxygen Adult/Peds    Oxygen Documentation:   I certify that this patient, Ramirez Leslie has been under my care (or a nurse practitioner or physican's assistant working with me). This is the face-to-face encounter for oxygen medical necessity.      Ramirez Leslie is now in a chronic stable state and continues to require supplemental oxygen. Patient has continued oxygen desaturation due to Acute hypoxic respiratory failure .    Alternative treatment(s) tried or considered and deemed clinically infective for treatment of hydrothorax include inhalers.  If portability is ordered, is the patient mobile within the home? yes    Patients who qualify for home O2 coverage under the CMS guidelines require ABG tests or O2 sat readings obtained closest to, but no earlier than 2 days prior to the discharge, as evidence of the need for home oxygen therapy. Testing must be performed while patient is in the chronic stable state. See notes for O2 sats.     Diet    Follow this diet upon discharge: Orders Placed This Encounter      Snacks/Supplements Adult: Boost Glucose  Control; Between Meals      2 Gram Sodium Diet       Significant Results and Procedures   Most Recent 3 CBC's:  Recent Labs   Lab Test 08/31/20  0615 08/30/20 0633 08/29/20 1931   WBC 2.0* 2.6* 3.4*   HGB 8.5* 9.8* 10.9*   MCV 89 88 86   PLT 39* 41* 57*     Most Recent 3 BMP's:  Recent Labs   Lab Test 09/01/20  0655 08/31/20 0615 08/30/20 0633 08/29/20 1931   NA  --  140 142 142   POTASSIUM  --  3.6 3.6 3.7   CHLORIDE  --  109 112* 111*   CO2  --  30 26 24   BUN  --  10 11 11   CR  --  0.77 0.77 0.82   ANIONGAP  --  1* 4 7   ADIS  --  7.7* 7.7* 8.3*   * 93 92 134*     Most Recent 2 LFT's:  Recent Labs   Lab Test 08/31/20 0615 08/29/20 1931   AST 24 47*   ALT 22 32   ALKPHOS 102 159*   BILITOTAL 1.2 1.3     Most Recent 3 INR's:  Recent Labs   Lab Test 08/29/20 1931   INR 1.29*   ,   Results for orders placed or performed during the hospital encounter of 08/29/20   XR Chest Port 1 View    Narrative    EXAM: XR CHEST PORT 1 VW  LOCATION: [facility]  DATE/TIME: 8/29/2020 8:33 PM    INDICATION: Dyspnea. Hypoxia.  COMPARISON: None.      Impression    IMPRESSION: Dense opacification involving the majority of inferior right hemithorax most suggestive of a moderately large right pleural effusion with accompanying atelectasis/consolidation of right middle and lower lobes. Right apex remains aerated. Left   lung clear. There is no shift of midline borderline cardiomegaly.   CT Chest/Abdomen/Pelvis w Contrast    Narrative    EXAM: CT CHEST/ABDOMEN/PELVIS W CONTRAST  LOCATION: [facility]  DATE/TIME: 8/29/2020 9:20 PM    INDICATION: Hypoxia. Pleural effusion. Left lower quadrant pain  COMPARISON: Abdominal CT 07/21/2006  TECHNIQUE: CT scan of the chest, abdomen, and pelvis was performed following injection of IV contrast. Multiplanar reformats were obtained. Dose reduction techniques were used.   CONTRAST: 100mL Isovue-[id number]    FINDINGS:   LUNGS AND PLEURA: Large right pleural effusion with  complete atelectasis of right middle and right lower lobes calcified granulomata present within right lung base. No lung or pleural mass appreciated. Aerated portions of right upper lobe are   unremarkable.  Minimal changes of emphysema evident linear peripheral sites of fibrosis.  MEDIASTINUM/AXILLAE: No significant lymphadenopathy. Prominent. Esophageal varices. Normal-sized heart.    HEPATOBILIARY: Small cirrhotic liver with recanalized umbilical vein and esophageal varices and a small splenorenal shunt related to portal venous hypertension.  PANCREAS: Normal.  SPLEEN: Prominent splenomegaly. Calcified granulomata.  ADRENAL GLANDS: Normal.  KIDNEYS/BLADDER: There are 3 stones present within each kidney; largest on the left measures 6 x 4 mm and largest on the right measures 4 x 3 mm. No hydronephrosis.    BOWEL: No obstruction. There does appear to be modest wall thickening involving the right hemicolon which is nonspecific given the ascites and third spacing right hemicolectomy colitis can't be excluded. Mild diffuse congestion mesentery. Motion   artifact. Modest diffuse ascites.  LYMPH NODES: No significant lymphadenopathy.  VASCULATURE: Heavy atherosclerotic plaque.  PELVIC ORGANS: Mild ascites.    MUSCULOSKELETAL: Unremarkable.      Impression    IMPRESSION:  1.  Moderately large right pleural effusion with atelectasis of right middle and lower lobes. No suspicious chest mass evident.  2.  Cirrhosis and portal venous hypertension. Very prominent paraesophageal varices are present. Splenomegaly and modest diffuse ascites.  3.  Mild nonspecific wall thickening of right hemicolon likely relates to ascites and third spacing though colitis also possible.  4.  Bilateral kidney stones. No hydroureter persists.   US Thoracentesis    Narrative    ULTRASOUND GUIDED THORACENTESIS  8/31/2020 12:36 PM     HISTORY: Large right pleural effusion.    FINDINGS: Limited ultrasound was performed to evaluate for the  presence  and best approach for drainage of a pleural effusion. An  image is archived. Written and oral informed consent was obtained. A  pause for the cause procedure to verify the correct patient and  correct procedure.     The skin overlying the left chest posteriorly was prepped and draped  in the usual sterile fashion. The subcutaneous tissues were  anesthetized with 10 mL 1% Lidocaine. Under direct ultrasound guidance  a catheter was advanced into the pleural space and 2.1 mL of  delmy  colored fluid was drained. The catheter was removed and a sterile  dressing was applied.     Patient was monitored by nurse under my direct supervision throughout  the exam. Ultrasound images were permanently stored.  There were no  immediate complications. Patient left the ultrasound suite in  satisfactory condition.      Impression    IMPRESSION: Technically successful thoracentesis without immediate  complications.    BLACK RAMIREZ DO   US Abdomen Limited    Narrative    ULTRASOUND ABDOMEN LIMITED   8/31/2020 12:35 PM     HISTORY: Ascites, shortness of breath.    COMPARISON: CT 8/29/2020    FINDINGS: Limited ultrasound of the abdomen was performed, images show  trace ascites. Paracentesis was not performed.      Impression    IMPRESSION: Trace ascites. Therapeutic paracentesis was not performed.    BLACK RAMIREZ DO       Discharge Medications   Current Discharge Medication List      START taking these medications    Details   ciprofloxacin (CIPRO) 500 MG tablet Take 1 tablet (500 mg) by mouth 2 times daily for 5 days  Qty: 14 tablet, Refills: 0    Associated Diagnoses: Alcoholic cirrhosis of liver with ascites (H)      furosemide (LASIX) 40 MG tablet Take 1 tablet (40 mg) by mouth daily  Qty: 30 tablet, Refills: 0    Associated Diagnoses: Alcoholic cirrhosis of liver with ascites (H)      spironolactone (ALDACTONE) 25 MG tablet Take 1 tablet (25 mg) by mouth daily  Qty: 30 tablet, Refills: 0    Associated Diagnoses:  Alcoholic cirrhosis of liver with ascites (H)         CONTINUE these medications which have CHANGED    Details   insulin glargine (LANTUS PEN) 100 UNIT/ML pen Inject 5 Units Subcutaneous every morning    Comments: If Lantus is not covered by insurance, may substitute Basaglar at same dose and frequency.           CONTINUE these medications which have NOT CHANGED    Details   atorvastatin (LIPITOR) 20 MG tablet Take 20 mg by mouth every morning       dicyclomine (BENTYL) 20 MG tablet Take 20 mg by mouth 2 times daily      metFORMIN (GLUCOPHAGE) 500 MG tablet Take 500 mg by mouth 2 times daily (with meals)      omeprazole (PRILOSEC) 20 MG DR capsule Take 20 mg by mouth 2 times daily         STOP taking these medications       carvedilol (COREG) 3.125 MG tablet Comments:   Reason for Stopping:             Allergies   No Known Allergies   Name And Contact Information For Health Care Proxy: Unknown Quality 130: Documentation Of Current Medications In The Medical Record: Current Medications Documented Quality 47: Advance Care Plan: Advance Care Planning discussed and documented; advance care plan or surrogate decision maker documented in the medical record. Detail Level: Detailed Quality 110: Preventive Care And Screening: Influenza Immunization: Influenza Immunization Administered during Influenza season Quality 226: Preventive Care And Screening: Tobacco Use: Screening And Cessation Intervention: Patient screened for tobacco use and is an ex/non-smoker Quality 111:Pneumonia Vaccination Status For Older Adults: Patient received any pneumococcal conjugate or polysaccharide vaccine on or after their 60th birthday and before the end of the measurement period